# Patient Record
Sex: FEMALE | Race: WHITE | NOT HISPANIC OR LATINO | URBAN - METROPOLITAN AREA
[De-identification: names, ages, dates, MRNs, and addresses within clinical notes are randomized per-mention and may not be internally consistent; named-entity substitution may affect disease eponyms.]

---

## 2017-06-06 ENCOUNTER — OUTPATIENT (OUTPATIENT)
Dept: OUTPATIENT SERVICES | Facility: HOSPITAL | Age: 77
LOS: 1 days | Discharge: HOME | End: 2017-06-06

## 2017-06-06 VITALS
OXYGEN SATURATION: 98 % | DIASTOLIC BLOOD PRESSURE: 79 MMHG | WEIGHT: 143.96 LBS | SYSTOLIC BLOOD PRESSURE: 140 MMHG | TEMPERATURE: 98 F | HEIGHT: 66 IN | HEART RATE: 60 BPM | RESPIRATION RATE: 18 BRPM

## 2017-06-06 DIAGNOSIS — E11.9 TYPE 2 DIABETES MELLITUS WITHOUT COMPLICATIONS: ICD-10-CM

## 2017-06-06 DIAGNOSIS — Z01.818 ENCOUNTER FOR OTHER PREPROCEDURAL EXAMINATION: ICD-10-CM

## 2017-06-06 DIAGNOSIS — I10 ESSENTIAL (PRIMARY) HYPERTENSION: ICD-10-CM

## 2017-06-06 DIAGNOSIS — S83.232D COMPLEX TEAR OF MEDIAL MENISCUS, CURRENT INJURY, LEFT KNEE, SUBSEQUENT ENCOUNTER: ICD-10-CM

## 2017-06-06 DIAGNOSIS — I25.10 ATHEROSCLEROTIC HEART DISEASE OF NATIVE CORONARY ARTERY WITHOUT ANGINA PECTORIS: ICD-10-CM

## 2017-06-06 DIAGNOSIS — M25.569 PAIN IN UNSPECIFIED KNEE: ICD-10-CM

## 2017-06-06 DIAGNOSIS — E78.00 PURE HYPERCHOLESTEROLEMIA, UNSPECIFIED: ICD-10-CM

## 2017-06-06 LAB
ANION GAP SERPL CALC-SCNC: 9 MMOL/L — SIGNIFICANT CHANGE UP (ref 5–17)
APTT BLD: 32.3 SEC — SIGNIFICANT CHANGE UP (ref 27.5–37.4)
BASOPHILS # BLD AUTO: 0.1 K/UL — SIGNIFICANT CHANGE UP (ref 0–0.2)
BASOPHILS NFR BLD AUTO: 0.9 % — SIGNIFICANT CHANGE UP (ref 0–2)
BUN SERPL-MCNC: 16 MG/DL — SIGNIFICANT CHANGE UP (ref 7–23)
CALCIUM SERPL-MCNC: 9.7 MG/DL — SIGNIFICANT CHANGE UP (ref 8.5–10.1)
CHLORIDE SERPL-SCNC: 100 MMOL/L — SIGNIFICANT CHANGE UP (ref 96–108)
CO2 SERPL-SCNC: 29 MMOL/L — SIGNIFICANT CHANGE UP (ref 22–31)
CREAT SERPL-MCNC: 0.84 MG/DL — SIGNIFICANT CHANGE UP (ref 0.5–1.3)
EOSINOPHIL # BLD AUTO: 0.1 K/UL — SIGNIFICANT CHANGE UP (ref 0–0.5)
EOSINOPHIL NFR BLD AUTO: 1.7 % — SIGNIFICANT CHANGE UP (ref 0–6)
GLUCOSE SERPL-MCNC: 267 MG/DL — HIGH (ref 70–99)
HCT VFR BLD CALC: 41 % — SIGNIFICANT CHANGE UP (ref 34.5–45)
HGB BLD-MCNC: 14.5 G/DL — SIGNIFICANT CHANGE UP (ref 11.5–15.5)
INR BLD: 1.05 RATIO — SIGNIFICANT CHANGE UP (ref 0.88–1.16)
LYMPHOCYTES # BLD AUTO: 1.8 K/UL — SIGNIFICANT CHANGE UP (ref 1–3.3)
LYMPHOCYTES # BLD AUTO: 26.1 % — SIGNIFICANT CHANGE UP (ref 13–44)
MCHC RBC-ENTMCNC: 32.3 PG — SIGNIFICANT CHANGE UP (ref 27–34)
MCHC RBC-ENTMCNC: 35.4 GM/DL — SIGNIFICANT CHANGE UP (ref 32–36)
MCV RBC AUTO: 91.5 FL — SIGNIFICANT CHANGE UP (ref 80–100)
MONOCYTES # BLD AUTO: 0.5 K/UL — SIGNIFICANT CHANGE UP (ref 0–0.9)
MONOCYTES NFR BLD AUTO: 7.2 % — SIGNIFICANT CHANGE UP (ref 2–14)
NEUTROPHILS # BLD AUTO: 4.4 K/UL — SIGNIFICANT CHANGE UP (ref 1.8–7.4)
NEUTROPHILS NFR BLD AUTO: 64.2 % — SIGNIFICANT CHANGE UP (ref 43–77)
PLATELET # BLD AUTO: 263 K/UL — SIGNIFICANT CHANGE UP (ref 150–400)
POTASSIUM SERPL-MCNC: 4.3 MMOL/L — SIGNIFICANT CHANGE UP (ref 3.5–5.3)
POTASSIUM SERPL-SCNC: 4.3 MMOL/L — SIGNIFICANT CHANGE UP (ref 3.5–5.3)
PROTHROM AB SERPL-ACNC: 11.5 SEC — SIGNIFICANT CHANGE UP (ref 9.8–12.7)
RBC # BLD: 4.48 M/UL — SIGNIFICANT CHANGE UP (ref 3.8–5.2)
RBC # FLD: 11.9 % — SIGNIFICANT CHANGE UP (ref 11–15)
SODIUM SERPL-SCNC: 138 MMOL/L — SIGNIFICANT CHANGE UP (ref 135–145)
WBC # BLD: 6.9 K/UL — SIGNIFICANT CHANGE UP (ref 3.8–10.5)
WBC # FLD AUTO: 6.9 K/UL — SIGNIFICANT CHANGE UP (ref 3.8–10.5)

## 2017-06-06 RX ORDER — METOPROLOL TARTRATE 50 MG
1 TABLET ORAL
Qty: 0 | Refills: 0 | COMMUNITY

## 2017-06-06 NOTE — H&P PST ADULT - PMH
CAD (coronary artery disease)  s/p MI & stent x1 in 05, x1 in 06 & x1 in 08 in [facility]  Diabetes mellitus    Essential hypertension    Hx of breast cancer    Hypercholesterolemia

## 2017-06-06 NOTE — H&P PST ADULT - PSH
H/O lumpectomy  2001  H/O: hysterectomy  2008  Status post coronary artery stent placement  x3, 2005, 2006, 2008

## 2017-06-06 NOTE — H&P PST ADULT - NSANTHOSAYNRD_GEN_A_CORE
No. MELINDA screening performed.  STOP BANG Legend: 0-2 = LOW Risk; 3-4 = INTERMEDIATE Risk; 5-8 = HIGH Risk/denies

## 2017-06-06 NOTE — ASU PATIENT PROFILE, ADULT - PMH
CAD (coronary artery disease)  s/p MI & stent x1 in 05, x1 in 06 & x1 in 08 in [facility]  Diabetes mellitus    Essential hypertension    Hx of breast cancer    Hypercholesterolemia
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
## 2017-06-16 ENCOUNTER — TRANSCRIPTION ENCOUNTER (OUTPATIENT)
Age: 77
End: 2017-06-16

## 2017-06-16 ENCOUNTER — OUTPATIENT (OUTPATIENT)
Dept: OUTPATIENT SERVICES | Facility: HOSPITAL | Age: 77
LOS: 1 days | Discharge: HOME | End: 2017-06-16
Payer: COMMERCIAL

## 2017-06-16 VITALS
DIASTOLIC BLOOD PRESSURE: 56 MMHG | OXYGEN SATURATION: 96 % | HEART RATE: 58 BPM | HEIGHT: 66 IN | RESPIRATION RATE: 16 BRPM | WEIGHT: 147.93 LBS | TEMPERATURE: 98 F | SYSTOLIC BLOOD PRESSURE: 126 MMHG

## 2017-06-16 VITALS
HEART RATE: 65 BPM | DIASTOLIC BLOOD PRESSURE: 58 MMHG | RESPIRATION RATE: 16 BRPM | TEMPERATURE: 97 F | OXYGEN SATURATION: 100 % | SYSTOLIC BLOOD PRESSURE: 114 MMHG

## 2017-06-16 PROCEDURE — 88304 TISSUE EXAM BY PATHOLOGIST: CPT | Mod: 26

## 2017-06-16 RX ORDER — SODIUM CHLORIDE 9 MG/ML
1000 INJECTION, SOLUTION INTRAVENOUS
Qty: 0 | Refills: 0 | Status: DISCONTINUED | OUTPATIENT
Start: 2017-06-16 | End: 2017-06-16

## 2017-06-16 RX ORDER — SODIUM CHLORIDE 9 MG/ML
3 INJECTION INTRAMUSCULAR; INTRAVENOUS; SUBCUTANEOUS EVERY 8 HOURS
Qty: 0 | Refills: 0 | Status: DISCONTINUED | OUTPATIENT
Start: 2017-06-16 | End: 2017-06-16

## 2017-06-16 RX ORDER — OXYCODONE HYDROCHLORIDE 5 MG/1
1 TABLET ORAL
Qty: 28 | Refills: 0 | OUTPATIENT
Start: 2017-06-16 | End: 2017-06-23

## 2017-06-16 RX ORDER — FENTANYL CITRATE 50 UG/ML
25 INJECTION INTRAVENOUS
Qty: 0 | Refills: 0 | Status: DISCONTINUED | OUTPATIENT
Start: 2017-06-16 | End: 2017-06-16

## 2017-06-16 RX ORDER — ACETAMINOPHEN 500 MG
1000 TABLET ORAL ONCE
Qty: 0 | Refills: 0 | Status: COMPLETED | OUTPATIENT
Start: 2017-06-16 | End: 2017-06-16

## 2017-06-16 RX ADMIN — Medication 400 MILLIGRAM(S): at 09:36

## 2017-06-16 RX ADMIN — SODIUM CHLORIDE 75 MILLILITER(S): 9 INJECTION, SOLUTION INTRAVENOUS at 08:54

## 2017-06-16 RX ADMIN — Medication 1000 MILLIGRAM(S): at 09:47

## 2017-06-16 NOTE — ASU DISCHARGE PLAN (ADULT/PEDIATRIC). - NOTIFY
Pain not relieved by Medications/Numbness, tingling/Swelling that continues/Fever greater than 101/Numbness, color, or temperature change to extremity/Bleeding that does not stop

## 2017-06-16 NOTE — BRIEF OPERATIVE NOTE - OPERATION/FINDINGS
right knee arthroscopy, partial medial and lateral menisectomy, chondroplasty, synovectomy  see op report

## 2017-06-16 NOTE — ASU PATIENT PROFILE, ADULT - PMH
CAD (coronary artery disease)  s/p MI & stent x1 in 05, x1 in 06 & x1 in 08 in [facility]  Diabetes mellitus    Essential hypertension    Hx of breast cancer    Hypercholesterolemia CAD (coronary artery disease)  s/p MI & stent x1 in 05, x1 in 06 & x1 in 08 in [facility]  Diabetes mellitus    Essential hypertension    Hx of breast cancer    Hypercholesterolemia

## 2017-06-16 NOTE — ASU DISCHARGE PLAN (ADULT/PEDIATRIC). - MEDICATION SUMMARY - MEDICATIONS TO TAKE
I will START or STAY ON the medications listed below when I get home from the hospital:    CoQ10 & Cinnamon  -- 1 dose(s) by mouth once a day  -- Indication: For per pmd    acetaminophen-oxyCODONE 325 mg-5 mg oral tablet  -- 1 tab(s) by mouth every 6 hours, As Needed MDD:4 for pain  -- Caution federal law prohibits the transfer of this drug to any person other  than the person for whom it was prescribed.  May cause drowsiness.  Alcohol may intensify this effect.  Use care when operating dangerous machinery.  This prescription cannot be refilled.  This product contains acetaminophen.  Do not use  with any other product containing acetaminophen to prevent possible liver damage.  Using more of this medication than prescribed may cause serious breathing problems.    -- Indication: For prn pain    aspirin 81 mg oral tablet  -- 1 tab(s) by mouth once a day  -- Indication: For  per pmd    metFORMIN 1000 mg oral tablet  -- 1 tab(s) by mouth 2 times a day  -- Indication: For  per pmd    glimepiride 4 mg oral tablet  -- 1 tab(s) by mouth once a day  -- Indication: For per pmd    atorvastatin 40 mg oral tablet  -- 1 tab(s) by mouth once a day (at bedtime)  -- Indication: For per pmd    metoprolol tartrate 50 mg oral tablet  -- 1 tab(s) by mouth once a day  -- Indication: For  per pmd    amLODIPine 10 mg oral tablet  -- 1 tab(s) by mouth once a day (at bedtime)  -- Indication: For per pmd    garlic oral capsule  -- 1 cap(s) by mouth once a day  -- Indication: For per pmd    Fish Oil 1000 mg oral capsule  -- 1 cap(s) by mouth once a day  -- Indication: For per pmd    Vitamin C 500 mg oral tablet  -- 1 tab(s) by mouth once a day  -- Indication: For per pmd

## 2017-06-19 LAB — SURGICAL PATHOLOGY FINAL REPORT - CH: SIGNIFICANT CHANGE UP

## 2017-10-02 NOTE — ASU PATIENT PROFILE, ADULT - PRO INTERPRETER NEED 2
[facility] faxed referral for rehab to [place] with [facility] for review. Patient is ready to discharge today for rehab.    Sandie Earl LMSW  [facility]  Ext. [phone]     English

## 2017-11-07 PROBLEM — Z00.00 ENCOUNTER FOR PREVENTIVE HEALTH EXAMINATION: Status: ACTIVE | Noted: 2017-11-07

## 2017-11-17 ENCOUNTER — APPOINTMENT (OUTPATIENT)
Dept: ORTHOPEDIC SURGERY | Facility: CLINIC | Age: 77
End: 2017-11-17
Payer: MEDICARE

## 2017-11-17 VITALS — BODY MASS INDEX: 23.78 KG/M2 | HEIGHT: 66 IN | WEIGHT: 148 LBS

## 2017-11-17 PROCEDURE — 73564 X-RAY EXAM KNEE 4 OR MORE: CPT | Mod: LT,RT

## 2017-11-17 PROCEDURE — 99203 OFFICE O/P NEW LOW 30 MIN: CPT

## 2017-12-08 ENCOUNTER — APPOINTMENT (OUTPATIENT)
Dept: ORTHOPEDIC SURGERY | Facility: CLINIC | Age: 77
End: 2017-12-08
Payer: MEDICARE

## 2017-12-08 PROCEDURE — 20610 DRAIN/INJ JOINT/BURSA W/O US: CPT | Mod: RT

## 2017-12-08 PROCEDURE — 99213 OFFICE O/P EST LOW 20 MIN: CPT | Mod: 25

## 2017-12-15 ENCOUNTER — APPOINTMENT (OUTPATIENT)
Dept: ORTHOPEDIC SURGERY | Facility: CLINIC | Age: 77
End: 2017-12-15
Payer: MEDICARE

## 2017-12-15 PROCEDURE — 20610 DRAIN/INJ JOINT/BURSA W/O US: CPT | Mod: LT

## 2017-12-22 ENCOUNTER — APPOINTMENT (OUTPATIENT)
Dept: ORTHOPEDIC SURGERY | Facility: CLINIC | Age: 77
End: 2017-12-22
Payer: MEDICARE

## 2017-12-22 PROCEDURE — 20610 DRAIN/INJ JOINT/BURSA W/O US: CPT | Mod: RT

## 2018-02-02 ENCOUNTER — APPOINTMENT (OUTPATIENT)
Dept: ORTHOPEDIC SURGERY | Facility: CLINIC | Age: 78
End: 2018-02-02
Payer: MEDICARE

## 2018-02-02 PROCEDURE — 99214 OFFICE O/P EST MOD 30 MIN: CPT

## 2019-04-26 ENCOUNTER — INPATIENT (INPATIENT)
Facility: HOSPITAL | Age: 79
LOS: 5 days | Discharge: HOME HEALTH | DRG: 224 | End: 2019-05-02
Attending: INTERNAL MEDICINE | Admitting: INTERNAL MEDICINE
Payer: MEDICARE

## 2019-04-26 VITALS
TEMPERATURE: 98 F | DIASTOLIC BLOOD PRESSURE: 72 MMHG | HEART RATE: 93 BPM | OXYGEN SATURATION: 96 % | RESPIRATION RATE: 17 BRPM | SYSTOLIC BLOOD PRESSURE: 128 MMHG

## 2019-04-26 DIAGNOSIS — I10 ESSENTIAL (PRIMARY) HYPERTENSION: ICD-10-CM

## 2019-04-26 DIAGNOSIS — I20.0 UNSTABLE ANGINA: ICD-10-CM

## 2019-04-26 DIAGNOSIS — E11.9 TYPE 2 DIABETES MELLITUS WITHOUT COMPLICATIONS: ICD-10-CM

## 2019-04-26 LAB
ALBUMIN SERPL ELPH-MCNC: 3.8 G/DL — SIGNIFICANT CHANGE UP (ref 3.3–5)
ALP SERPL-CCNC: 57 U/L — SIGNIFICANT CHANGE UP (ref 40–120)
ALT FLD-CCNC: 18 U/L — SIGNIFICANT CHANGE UP (ref 10–45)
ANION GAP SERPL CALC-SCNC: 11 MMOL/L — SIGNIFICANT CHANGE UP (ref 5–17)
APTT BLD: 30.4 SEC — SIGNIFICANT CHANGE UP (ref 27.5–36.3)
AST SERPL-CCNC: 16 U/L — SIGNIFICANT CHANGE UP (ref 10–40)
BASOPHILS # BLD AUTO: 0.03 K/UL — SIGNIFICANT CHANGE UP (ref 0–0.2)
BASOPHILS NFR BLD AUTO: 0.5 % — SIGNIFICANT CHANGE UP (ref 0–2)
BILIRUB SERPL-MCNC: 0.6 MG/DL — SIGNIFICANT CHANGE UP (ref 0.2–1.2)
BUN SERPL-MCNC: 14 MG/DL — SIGNIFICANT CHANGE UP (ref 7–23)
CALCIUM SERPL-MCNC: 9.7 MG/DL — SIGNIFICANT CHANGE UP (ref 8.4–10.5)
CHLORIDE SERPL-SCNC: 102 MMOL/L — SIGNIFICANT CHANGE UP (ref 96–108)
CK MB CFR SERPL CALC: 1.3 NG/ML — SIGNIFICANT CHANGE UP (ref 0–6.7)
CK MB CFR SERPL CALC: <1 NG/ML — SIGNIFICANT CHANGE UP (ref 0–6.7)
CK SERPL-CCNC: 27 U/L — SIGNIFICANT CHANGE UP (ref 25–170)
CO2 SERPL-SCNC: 26 MMOL/L — SIGNIFICANT CHANGE UP (ref 22–31)
CREAT SERPL-MCNC: 0.6 MG/DL — SIGNIFICANT CHANGE UP (ref 0.5–1.3)
EOSINOPHIL # BLD AUTO: 0.09 K/UL — SIGNIFICANT CHANGE UP (ref 0–0.5)
EOSINOPHIL NFR BLD AUTO: 1.6 % — SIGNIFICANT CHANGE UP (ref 0–6)
GLUCOSE BLDC GLUCOMTR-MCNC: 215 MG/DL — HIGH (ref 70–99)
GLUCOSE BLDC GLUCOMTR-MCNC: 221 MG/DL — HIGH (ref 70–99)
GLUCOSE BLDC GLUCOMTR-MCNC: 222 MG/DL — HIGH (ref 70–99)
GLUCOSE SERPL-MCNC: 213 MG/DL — HIGH (ref 70–99)
HCT VFR BLD CALC: 38.4 % — SIGNIFICANT CHANGE UP (ref 34.5–45)
HGB BLD-MCNC: 12.5 G/DL — SIGNIFICANT CHANGE UP (ref 11.5–15.5)
IMM GRANULOCYTES NFR BLD AUTO: 0.2 % — SIGNIFICANT CHANGE UP (ref 0–1.5)
INR BLD: 1.1 — SIGNIFICANT CHANGE UP (ref 0.88–1.16)
LYMPHOCYTES # BLD AUTO: 2.97 K/UL — SIGNIFICANT CHANGE UP (ref 1–3.3)
LYMPHOCYTES # BLD AUTO: 54.1 % — HIGH (ref 13–44)
MCHC RBC-ENTMCNC: 31 PG — SIGNIFICANT CHANGE UP (ref 27–34)
MCHC RBC-ENTMCNC: 32.6 GM/DL — SIGNIFICANT CHANGE UP (ref 32–36)
MCV RBC AUTO: 95.3 FL — SIGNIFICANT CHANGE UP (ref 80–100)
MONOCYTES # BLD AUTO: 0.48 K/UL — SIGNIFICANT CHANGE UP (ref 0–0.9)
MONOCYTES NFR BLD AUTO: 8.7 % — SIGNIFICANT CHANGE UP (ref 2–14)
NEUTROPHILS # BLD AUTO: 1.91 K/UL — SIGNIFICANT CHANGE UP (ref 1.8–7.4)
NEUTROPHILS NFR BLD AUTO: 34.9 % — LOW (ref 43–77)
NRBC # BLD: 0 /100 WBCS — SIGNIFICANT CHANGE UP (ref 0–0)
PLATELET # BLD AUTO: 228 K/UL — SIGNIFICANT CHANGE UP (ref 150–400)
POTASSIUM SERPL-MCNC: 4.4 MMOL/L — SIGNIFICANT CHANGE UP (ref 3.5–5.3)
POTASSIUM SERPL-SCNC: 4.4 MMOL/L — SIGNIFICANT CHANGE UP (ref 3.5–5.3)
PROT SERPL-MCNC: 7.5 G/DL — SIGNIFICANT CHANGE UP (ref 6–8.3)
PROTHROM AB SERPL-ACNC: 12.5 SEC — SIGNIFICANT CHANGE UP (ref 10–12.9)
RBC # BLD: 4.03 M/UL — SIGNIFICANT CHANGE UP (ref 3.8–5.2)
RBC # FLD: 14.6 % — HIGH (ref 10.3–14.5)
SODIUM SERPL-SCNC: 139 MMOL/L — SIGNIFICANT CHANGE UP (ref 135–145)
TROPONIN T SERPL-MCNC: <0.01 NG/ML — SIGNIFICANT CHANGE UP (ref 0–0.01)
TROPONIN T SERPL-MCNC: <0.01 NG/ML — SIGNIFICANT CHANGE UP (ref 0–0.01)
WBC # BLD: 5.49 K/UL — SIGNIFICANT CHANGE UP (ref 3.8–10.5)
WBC # FLD AUTO: 5.49 K/UL — SIGNIFICANT CHANGE UP (ref 3.8–10.5)

## 2019-04-26 PROCEDURE — 99285 EMERGENCY DEPT VISIT HI MDM: CPT | Mod: 25

## 2019-04-26 PROCEDURE — 71046 X-RAY EXAM CHEST 2 VIEWS: CPT | Mod: 26

## 2019-04-26 PROCEDURE — 93010 ELECTROCARDIOGRAM REPORT: CPT

## 2019-04-26 PROCEDURE — 93970 EXTREMITY STUDY: CPT | Mod: 26

## 2019-04-26 RX ORDER — OMEGA-3 ACID ETHYL ESTERS 1 G
1 CAPSULE ORAL
Qty: 0 | Refills: 0 | COMMUNITY

## 2019-04-26 RX ORDER — INSULIN LISPRO 100/ML
VIAL (ML) SUBCUTANEOUS
Qty: 0 | Refills: 0 | Status: DISCONTINUED | OUTPATIENT
Start: 2019-04-26 | End: 2019-04-27

## 2019-04-26 RX ORDER — ATORVASTATIN CALCIUM 80 MG/1
40 TABLET, FILM COATED ORAL AT BEDTIME
Qty: 0 | Refills: 0 | Status: DISCONTINUED | OUTPATIENT
Start: 2019-04-26 | End: 2019-05-02

## 2019-04-26 RX ORDER — SODIUM CHLORIDE 9 MG/ML
500 INJECTION INTRAMUSCULAR; INTRAVENOUS; SUBCUTANEOUS
Qty: 0 | Refills: 0 | Status: DISCONTINUED | OUTPATIENT
Start: 2019-04-26 | End: 2019-04-26

## 2019-04-26 RX ORDER — FLUTICASONE PROPIONATE 50 MCG
1 SPRAY, SUSPENSION NASAL
Qty: 0 | Refills: 0 | Status: DISCONTINUED | OUTPATIENT
Start: 2019-04-26 | End: 2019-04-27

## 2019-04-26 RX ORDER — PROPOFOL 10 MG/ML
5 INJECTION, EMULSION INTRAVENOUS
Qty: 1000 | Refills: 0 | Status: DISCONTINUED | OUTPATIENT
Start: 2019-04-26 | End: 2019-04-27

## 2019-04-26 RX ORDER — NOREPINEPHRINE BITARTRATE/D5W 8 MG/250ML
0.05 PLASTIC BAG, INJECTION (ML) INTRAVENOUS
Qty: 8 | Refills: 0 | Status: DISCONTINUED | OUTPATIENT
Start: 2019-04-26 | End: 2019-04-28

## 2019-04-26 RX ORDER — METOPROLOL TARTRATE 50 MG
50 TABLET ORAL DAILY
Qty: 0 | Refills: 0 | Status: DISCONTINUED | OUTPATIENT
Start: 2019-04-26 | End: 2019-04-27

## 2019-04-26 RX ORDER — AMIODARONE HYDROCHLORIDE 400 MG/1
150 TABLET ORAL ONCE
Qty: 0 | Refills: 0 | Status: COMPLETED | OUTPATIENT
Start: 2019-04-26 | End: 2019-04-26

## 2019-04-26 RX ORDER — CLOPIDOGREL BISULFATE 75 MG/1
75 TABLET, FILM COATED ORAL DAILY
Qty: 0 | Refills: 0 | Status: DISCONTINUED | OUTPATIENT
Start: 2019-04-27 | End: 2019-05-02

## 2019-04-26 RX ORDER — ASCORBIC ACID 60 MG
1 TABLET,CHEWABLE ORAL
Qty: 0 | Refills: 0 | COMMUNITY

## 2019-04-26 RX ORDER — METFORMIN HYDROCHLORIDE 850 MG/1
1 TABLET ORAL
Qty: 0 | Refills: 0 | COMMUNITY

## 2019-04-26 RX ORDER — CLOPIDOGREL BISULFATE 75 MG/1
600 TABLET, FILM COATED ORAL ONCE
Qty: 0 | Refills: 0 | Status: COMPLETED | OUTPATIENT
Start: 2019-04-26 | End: 2019-04-26

## 2019-04-26 RX ORDER — AMLODIPINE BESYLATE 2.5 MG/1
10 TABLET ORAL AT BEDTIME
Qty: 0 | Refills: 0 | Status: DISCONTINUED | OUTPATIENT
Start: 2019-04-26 | End: 2019-04-27

## 2019-04-26 RX ORDER — SODIUM CHLORIDE 9 MG/ML
1000 INJECTION INTRAMUSCULAR; INTRAVENOUS; SUBCUTANEOUS ONCE
Qty: 0 | Refills: 0 | Status: COMPLETED | OUTPATIENT
Start: 2019-04-26 | End: 2019-04-26

## 2019-04-26 RX ORDER — ASPIRIN/CALCIUM CARB/MAGNESIUM 324 MG
81 TABLET ORAL DAILY
Qty: 0 | Refills: 0 | Status: DISCONTINUED | OUTPATIENT
Start: 2019-04-26 | End: 2019-04-28

## 2019-04-26 RX ORDER — GLIMEPIRIDE 1 MG
1 TABLET ORAL
Qty: 0 | Refills: 0 | COMMUNITY

## 2019-04-26 RX ORDER — GARLIC 1000 MG
1 CAPSULE ORAL
Qty: 0 | Refills: 0 | COMMUNITY

## 2019-04-26 RX ORDER — METOPROLOL TARTRATE 50 MG
1 TABLET ORAL
Qty: 0 | Refills: 0 | COMMUNITY

## 2019-04-26 RX ORDER — AMIODARONE HYDROCHLORIDE 400 MG/1
0.5 TABLET ORAL
Qty: 900 | Refills: 0 | Status: DISCONTINUED | OUTPATIENT
Start: 2019-04-26 | End: 2019-04-27

## 2019-04-26 RX ADMIN — Medication 81 MILLIGRAM(S): at 15:36

## 2019-04-26 RX ADMIN — AMIODARONE HYDROCHLORIDE 600 MILLIGRAM(S): 400 TABLET ORAL at 23:31

## 2019-04-26 RX ADMIN — MORPHINE SULFATE 1 MILLIGRAM(S): 50 CAPSULE, EXTENDED RELEASE ORAL at 23:45

## 2019-04-26 RX ADMIN — SODIUM CHLORIDE 75 MILLILITER(S): 9 INJECTION INTRAMUSCULAR; INTRAVENOUS; SUBCUTANEOUS at 16:02

## 2019-04-26 RX ADMIN — Medication 50 MILLIGRAM(S): at 15:36

## 2019-04-26 RX ADMIN — Medication 2: at 16:04

## 2019-04-26 RX ADMIN — MORPHINE SULFATE 1 MILLIGRAM(S): 50 CAPSULE, EXTENDED RELEASE ORAL at 23:30

## 2019-04-26 RX ADMIN — AMLODIPINE BESYLATE 10 MILLIGRAM(S): 2.5 TABLET ORAL at 22:45

## 2019-04-26 RX ADMIN — Medication 2: at 22:45

## 2019-04-26 RX ADMIN — CLOPIDOGREL BISULFATE 600 MILLIGRAM(S): 75 TABLET, FILM COATED ORAL at 16:02

## 2019-04-26 RX ADMIN — ATORVASTATIN CALCIUM 40 MILLIGRAM(S): 80 TABLET, FILM COATED ORAL at 22:45

## 2019-04-26 NOTE — PROCEDURE NOTE - NSPROCDETAILS_GEN_ALL_CORE
patient pre-oxygenated, tube inserted, placement confirmed/Bag Valve ventilation by RT after intubation

## 2019-04-26 NOTE — H&P ADULT - ASSESSMENT
78yo F with FHx CAD and PMHx of HLD, HTN, NIDDM-II, right breast CA s/p lumpectomy and chemo (in remission) and CAD s/p PCI x 3 (last 2008 @ [facility]) who presented to [facility] ED on 4/26/19 complaining of unstable angina.

## 2019-04-26 NOTE — H&P ADULT - PROBLEM SELECTOR PLAN 4
- follow up LE duplex ordered in ED    DVT PPx: Heparin SQ  DISPO: NPO for possible cardiac cath today

## 2019-04-26 NOTE — H&P ADULT - HISTORY OF PRESENT ILLNESS
80yo F with PMHx of HLD, DM and CAD s/p PCI x 3 who presented to [facility] ED on 4/26/19 complaining of SOB on minimal exertion x 3 weeks. She also had a short episode of mild CP a few days ago. Symptoms are similar to prior stents. Vitals on arrival to the ED were Temp 98F, HR 93bpm, /72, RR 17, O2 sat 96% RA. Labs significant for trop negative x 1, BNP 1141. EKG revealed SR @ 73bpm with incomplete LBBB. CXR revealed right middle/lower lobe infiltrate vs atelectasis per verbal radiology read. 80yo F with FHx CAD and PMHx of HLD, HTN, NIDDM-II, right breast CA s/p lumpectomy and chemo (in remission) and CAD s/p PCI x 3 (last 2008 @ [facility]) who presented to [facility] ED on 4/26/19 complaining of SOB on minimal exertion x 2 weeks. Patient reports that she used to be able to walk about 5 blocks without an issue but recently cannot even walk a full block without having to stop 2/2 SOB. Additionally, she reports she has been having intermittent, substernal, 5/10, pressure-like CP both at rest and on exertion that lasts for about 1-2 min before resolving on its own. She also reports worsening LE edema. She states that these symptoms are similar to the last time she had a cardiac cath. Patient denies fever, chills, palpitations, PND, orthopnea, hematochezia, melena, dizziness, syncope. Vitals on arrival to the ED were Temp 98F, HR 93bpm, /72, RR 17, O2 sat 96% RA. Labs significant for trop negative x 1, BNP 1141. EKG revealed SR @ 73bpm with incomplete LBBB. CXR revealed right middle/lower lobe infiltrate vs atelectasis per verbal radiology read. Patient is now admitted to [facility] for further management of unstable angina. 78yo F with FHx CAD and PMHx of HLD, HTN, NIDDM-II, right breast CA s/p lumpectomy and chemo (in remission) and CAD s/p PCI x 3 (last 2008 @ [facility]) who presented to [facility] ED on 4/26/19 complaining of SOB on minimal exertion x 2 weeks. Patient reports that she used to be able to walk about 5 blocks without an issue but recently cannot even walk a full block without having to stop 2/2 SOB. Additionally, she reports she has been having intermittent, substernal, 5/10, pressure-like CP both at rest and on exertion that lasts for about 1-2 min before resolving on its own. She also reports worsening LE edema. She states that these symptoms are similar to the last time she had a cardiac cath. Patient denies fever, chills, palpitations, PND, orthopnea, hematochezia, melena, dizziness, syncope. Vitals on arrival to the ED were Temp 98F, HR 93bpm, /72, RR 17, O2 sat 96% RA. Labs significant for trop negative x 1, BNP 1141. EKG revealed SR @ 73bpm with incomplete LBBB and PVCs. CXR revealed right middle/lower lobe infiltrate vs atelectasis per verbal radiology read. Patient is now admitted to [facility] for further management of unstable angina.

## 2019-04-26 NOTE — PROCEDURE NOTE - ADDITIONAL PROCEDURE DETAILS
Was called to intubate this patient.  Pre-O2 with 100% O2.  Patient sedated with Propofol.  DL done using MAC #3 with cricoid pressure.  Vocal cords visualized, ETT placed atraumatically, attempt x1.  Breath sounds bilateral, EtCO2 (+).  ETT secured, CXR pending

## 2019-04-26 NOTE — H&P ADULT - NSHPLABSRESULTS_GEN_ALL_CORE
12.5   5.49  )-----------( 228      ( 26 Apr 2019 09:41 )             38.4       04-26    139  |  102  |  14  ----------------------------<  213<H>  4.4   |  26  |  0.60    Ca    9.7      26 Apr 2019 09:41    TPro  7.5  /  Alb  3.8  /  TBili  0.6  /  DBili  x   /  AST  16  /  ALT  18  /  AlkPhos  57  04-26      PT/INR - ( 26 Apr 2019 09:41 )   PT: 12.5 sec;   INR: 1.10          PTT - ( 26 Apr 2019 09:41 )  PTT:30.4 sec    CARDIAC MARKERS ( 26 Apr 2019 09:41 )  x     / <0.01 ng/mL / 33 U/L / x     / 1.3 ng/mL

## 2019-04-26 NOTE — H&P ADULT - NSICDXPASTMEDICALHX_GEN_ALL_CORE_FT
PAST MEDICAL HISTORY:  CAD (coronary artery disease) s/p MI & stent x1 in 05, x1 in 06 & x1 in 08 in [facility]    Diabetes mellitus     Essential hypertension     Hx of breast cancer     Hypercholesterolemia

## 2019-04-26 NOTE — ED ADULT TRIAGE NOTE - OTHER COMPLAINTS
pt c.o sob x 3 weeks worsening with exertion. c.o b/l leg swelling. scheduled for stress test in 2 weeks. hx cardiac stents. denies cp,cough, fever.

## 2019-04-26 NOTE — PATIENT PROFILE ADULT - LIVES WITH
Cai.      alone

## 2019-04-26 NOTE — ED PROVIDER NOTE - OBJECTIVE STATEMENT
80 yo with ho of DM, HLD, CAD w 3 stents, last stent 2008, remote breast CA with lumpectomy in 2001,   patient reports 3 weeks of new exertional SOB, even with light exertion, short episode of CP at rest last week, no cough or fevers, no ho of PE, remote CA, no hormones, no travel or surgery recently, no leg pain, no edema, ho of mild abd pain, no n/v/d, no black or bloody stools,

## 2019-04-26 NOTE — H&P ADULT - PROBLEM SELECTOR PLAN 1
CP free and HD stable  - monitor on tele  - continue ASA 81mg QD, lipitor 40mg QHS  - trops negative x 1, follow up repeats  - tentative plan for cardiac cath today CP free and HD stable  - monitor on tele  - continue ASA 81mg QD, lipitor 40mg QHS and toprol XL 25mg QD; loaded with plavix 600mg   - NS @ 75cc/hr pre-cath fluids  - trops negative x 1, follow up repeats  - NPO for cardiac cath today with Dr. Dumotn CP free and HD stable  - monitor on tele  - continue ASA 81mg QD, lipitor 40mg QHS and toprol XL 25mg QD; loaded with plavix 600mg   - NS @ 75cc/hr pre-cath fluids  - trops negative x 1, follow up repeats  - follow up echo  - NPO for cardiac cath today with Dr. Dumont

## 2019-04-26 NOTE — PROGRESS NOTE ADULT - SUBJECTIVE AND OBJECTIVE BOX
Pt is s/p CAD   Hx 3 Coronary Stents  who noted  a return of symptoms   Chest pain and dyspnea with exertion   for 3 weeks     PAST MEDICAL & SURGICAL HISTORY:  CAD (coronary artery disease): s/p MI &amp; stent x1 in 05, x1 in 06 &amp; x1 in 08 in [facility]  Hx of breast cancer  Essential hypertension  Hypercholesterolemia  Diabetes mellitus  H/O lumpectomy: 2001  Status post coronary artery stent placement: x3, 2005, 2006, 2008  H/O: hysterectomy: 2008      MEDICATIONS  (STANDING):    MEDICATIONS  (PRN):    Home Medications:  amLODIPine 10 mg oral tablet: 1 tab(s) orally once a day (at bedtime) (16 Jun 2017 06:42)  aspirin 81 mg oral tablet: 1 tab(s) orally once a day (16 Jun 2017 06:42)  atorvastatin 40 mg oral tablet: 1 tab(s) orally once a day (at bedtime) (16 Jun 2017 06:42)  CoQ10 &amp; Cinnamon: 1 dose(s) orally once a day (16 Jun 2017 06:42)  Fish Oil 1000 mg oral capsule: 1 cap(s) orally once a day (16 Jun 2017 06:42)  garlic oral capsule: 1 cap(s) orally once a day (16 Jun 2017 06:42)  glimepiride 4 mg oral tablet: 1 tab(s) orally once a day (16 Jun 2017 06:42)  metFORMIN 1000 mg oral tablet: 1 tab(s) orally 2 times a day (16 Jun 2017 06:42)  metoprolol tartrate 50 mg oral tablet: 1 tab(s) orally once a day (16 Jun 2017 06:42)  Vitamin C 500 mg oral tablet: 1 tab(s) orally once a day (06 Jun 2017 10:07)    ICU Vital Signs Last 24 Hrs  T(C): 36.6 (26 Apr 2019 12:40), Max: 36.7 (26 Apr 2019 09:13)  T(F): 97.8 (26 Apr 2019 12:40), Max: 98 (26 Apr 2019 09:13)  HR: 64 (26 Apr 2019 12:40) (64 - 93)  BP: 121/62 (26 Apr 2019 12:40) (121/62 - 128/72)  BP(mean): --  ABP: --  ABP(mean): --  RR: 17 (26 Apr 2019 12:40) (17 - 17)  SpO2: 98% (26 Apr 2019 12:40) (96% - 98%)      Lungs clear  Cv s1 s2  abd soft  etx stable                          12.5   5.49  )-----------( 228      ( 26 Apr 2019 09:41 )             38.4   04-26    139  |  102  |  14  ----------------------------<  213<H>  4.4   |  26  |  0.60    Ca    9.7      26 Apr 2019 09:41    TPro  7.5  /  Alb  3.8  /  TBili  0.6  /  DBili  x   /  AST  16  /  ALT  18  /  AlkPhos  57  04-26      CARDIAC MARKERS ( 26 Apr 2019 09:41 )  x     / <0.01 ng/mL / 33 U/L / x     / 1.3 ng/mL    EKG  NS  PVC s    Incomp   LBBB  NSSTTC

## 2019-04-26 NOTE — H&P ADULT - NSICDXPASTSURGICALHX_GEN_ALL_CORE_FT
PAST SURGICAL HISTORY:  H/O lumpectomy 2001    H/O: hysterectomy 2008    Status post coronary artery stent placement x3, 2005, 2006, 2008

## 2019-04-26 NOTE — ED PROVIDER NOTE - PMH
CAD (coronary artery disease)  s/p MI & stent x1 in 05, x1 in 06 & x1 in 08 in [facility]  Diabetes mellitus    Essential hypertension    Hx of breast cancer    Hypercholesterolemia
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
## 2019-04-26 NOTE — ED PROVIDER NOTE - CLINICAL SUMMARY MEDICAL DECISION MAKING FREE TEXT BOX
80 yo with new exertional SOB, suspect ACS, consider anemia labs pending, no black or bloody stools, unlikely pna, no cough or fevrs, consider CHF but less likely as lungs clear on exam  PLAN : EKG, L , CXR  likely admission for further cardiac evaluation and treatment.   dispo pending workup.

## 2019-04-26 NOTE — ED PROVIDER NOTE - X-RAY INTERPRETATION
ER physician/blunting of right constophrenic angle, possible infiltrate vs effusion, vs atelectasis , heart shadow normal, no bony abnormalities

## 2019-04-27 DIAGNOSIS — I47.2 VENTRICULAR TACHYCARDIA: ICD-10-CM

## 2019-04-27 DIAGNOSIS — Z91.89 OTHER SPECIFIED PERSONAL RISK FACTORS, NOT ELSEWHERE CLASSIFIED: ICD-10-CM

## 2019-04-27 DIAGNOSIS — I10 ESSENTIAL (PRIMARY) HYPERTENSION: ICD-10-CM

## 2019-04-27 DIAGNOSIS — A41.9 SEPSIS, UNSPECIFIED ORGANISM: ICD-10-CM

## 2019-04-27 DIAGNOSIS — R63.8 OTHER SYMPTOMS AND SIGNS CONCERNING FOOD AND FLUID INTAKE: ICD-10-CM

## 2019-04-27 LAB
ALBUMIN SERPL ELPH-MCNC: 3.7 G/DL — SIGNIFICANT CHANGE UP (ref 3.3–5)
ALBUMIN SERPL ELPH-MCNC: 3.8 G/DL — SIGNIFICANT CHANGE UP (ref 3.3–5)
ALP SERPL-CCNC: 56 U/L — SIGNIFICANT CHANGE UP (ref 40–120)
ALP SERPL-CCNC: 59 U/L — SIGNIFICANT CHANGE UP (ref 40–120)
ALT FLD-CCNC: 41 U/L — SIGNIFICANT CHANGE UP (ref 10–45)
ALT FLD-CCNC: 56 U/L — HIGH (ref 10–45)
ANION GAP SERPL CALC-SCNC: 10 MMOL/L — SIGNIFICANT CHANGE UP (ref 5–17)
ANION GAP SERPL CALC-SCNC: 14 MMOL/L — SIGNIFICANT CHANGE UP (ref 5–17)
ANION GAP SERPL CALC-SCNC: 17 MMOL/L — SIGNIFICANT CHANGE UP (ref 5–17)
APTT BLD: 133 SEC — CRITICAL HIGH (ref 27.5–36.3)
APTT BLD: 23.9 SEC — LOW (ref 27.5–36.3)
AST SERPL-CCNC: 38 U/L — SIGNIFICANT CHANGE UP (ref 10–40)
AST SERPL-CCNC: 52 U/L — HIGH (ref 10–40)
BASE EXCESS BLDA CALC-SCNC: -0.2 MMOL/L — SIGNIFICANT CHANGE UP (ref -2–3)
BASE EXCESS BLDA CALC-SCNC: 3.1 MMOL/L — HIGH (ref -2–3)
BASE EXCESS BLDV CALC-SCNC: 1.1 MMOL/L — SIGNIFICANT CHANGE UP
BASOPHILS # BLD AUTO: 0.04 K/UL — SIGNIFICANT CHANGE UP (ref 0–0.2)
BASOPHILS # BLD AUTO: 0.04 K/UL — SIGNIFICANT CHANGE UP (ref 0–0.2)
BASOPHILS NFR BLD AUTO: 0.3 % — SIGNIFICANT CHANGE UP (ref 0–2)
BASOPHILS NFR BLD AUTO: 0.4 % — SIGNIFICANT CHANGE UP (ref 0–2)
BILIRUB SERPL-MCNC: 0.6 MG/DL — SIGNIFICANT CHANGE UP (ref 0.2–1.2)
BILIRUB SERPL-MCNC: 0.6 MG/DL — SIGNIFICANT CHANGE UP (ref 0.2–1.2)
BUN SERPL-MCNC: 13 MG/DL — SIGNIFICANT CHANGE UP (ref 7–23)
BUN SERPL-MCNC: 16 MG/DL — SIGNIFICANT CHANGE UP (ref 7–23)
BUN SERPL-MCNC: 18 MG/DL — SIGNIFICANT CHANGE UP (ref 7–23)
CALCIUM SERPL-MCNC: 8.9 MG/DL — SIGNIFICANT CHANGE UP (ref 8.4–10.5)
CALCIUM SERPL-MCNC: 8.9 MG/DL — SIGNIFICANT CHANGE UP (ref 8.4–10.5)
CALCIUM SERPL-MCNC: 9.5 MG/DL — SIGNIFICANT CHANGE UP (ref 8.4–10.5)
CHLORIDE SERPL-SCNC: 102 MMOL/L — SIGNIFICANT CHANGE UP (ref 96–108)
CHLORIDE SERPL-SCNC: 104 MMOL/L — SIGNIFICANT CHANGE UP (ref 96–108)
CHLORIDE SERPL-SCNC: 98 MMOL/L — SIGNIFICANT CHANGE UP (ref 96–108)
CK MB CFR SERPL CALC: 1 NG/ML — SIGNIFICANT CHANGE UP (ref 0–6.7)
CK SERPL-CCNC: 25 U/L — SIGNIFICANT CHANGE UP (ref 25–170)
CO2 SERPL-SCNC: 19 MMOL/L — LOW (ref 22–31)
CO2 SERPL-SCNC: 22 MMOL/L — SIGNIFICANT CHANGE UP (ref 22–31)
CO2 SERPL-SCNC: 24 MMOL/L — SIGNIFICANT CHANGE UP (ref 22–31)
CREAT SERPL-MCNC: 0.59 MG/DL — SIGNIFICANT CHANGE UP (ref 0.5–1.3)
CREAT SERPL-MCNC: 0.74 MG/DL — SIGNIFICANT CHANGE UP (ref 0.5–1.3)
CREAT SERPL-MCNC: 0.77 MG/DL — SIGNIFICANT CHANGE UP (ref 0.5–1.3)
EOSINOPHIL # BLD AUTO: 0.01 K/UL — SIGNIFICANT CHANGE UP (ref 0–0.5)
EOSINOPHIL # BLD AUTO: 0.07 K/UL — SIGNIFICANT CHANGE UP (ref 0–0.5)
EOSINOPHIL NFR BLD AUTO: 0.1 % — SIGNIFICANT CHANGE UP (ref 0–6)
EOSINOPHIL NFR BLD AUTO: 0.6 % — SIGNIFICANT CHANGE UP (ref 0–6)
ERYTHROCYTE [SEDIMENTATION RATE] IN BLOOD: 18 MM/HR — SIGNIFICANT CHANGE UP
EXTRA LAVENDER TOP TUBE: SIGNIFICANT CHANGE UP
GAS PNL BLDV: SIGNIFICANT CHANGE UP
GLUCOSE BLDC GLUCOMTR-MCNC: 136 MG/DL — HIGH (ref 70–99)
GLUCOSE BLDC GLUCOMTR-MCNC: 184 MG/DL — HIGH (ref 70–99)
GLUCOSE BLDC GLUCOMTR-MCNC: 185 MG/DL — HIGH (ref 70–99)
GLUCOSE BLDC GLUCOMTR-MCNC: 205 MG/DL — HIGH (ref 70–99)
GLUCOSE BLDC GLUCOMTR-MCNC: 257 MG/DL — HIGH (ref 70–99)
GLUCOSE SERPL-MCNC: 201 MG/DL — HIGH (ref 70–99)
GLUCOSE SERPL-MCNC: 222 MG/DL — HIGH (ref 70–99)
GLUCOSE SERPL-MCNC: 377 MG/DL — HIGH (ref 70–99)
HAV IGM SER-ACNC: SIGNIFICANT CHANGE UP
HBV CORE IGM SER-ACNC: ABNORMAL
HBV SURFACE AG SER-ACNC: SIGNIFICANT CHANGE UP
HCO3 BLDA-SCNC: 25 MMOL/L — SIGNIFICANT CHANGE UP (ref 21–28)
HCO3 BLDA-SCNC: 29 MMOL/L — HIGH (ref 21–28)
HCO3 BLDV-SCNC: 28 MMOL/L — HIGH (ref 20–27)
HCT VFR BLD CALC: 37 % — SIGNIFICANT CHANGE UP (ref 34.5–45)
HCT VFR BLD CALC: 37.2 % — SIGNIFICANT CHANGE UP (ref 34.5–45)
HCT VFR BLD CALC: 41.6 % — SIGNIFICANT CHANGE UP (ref 34.5–45)
HCV AB S/CO SERPL IA: 0.09 S/CO — SIGNIFICANT CHANGE UP
HCV AB SERPL-IMP: SIGNIFICANT CHANGE UP
HGB BLD-MCNC: 12.2 G/DL — SIGNIFICANT CHANGE UP (ref 11.5–15.5)
HGB BLD-MCNC: 12.5 G/DL — SIGNIFICANT CHANGE UP (ref 11.5–15.5)
HGB BLD-MCNC: 12.9 G/DL — SIGNIFICANT CHANGE UP (ref 11.5–15.5)
IMM GRANULOCYTES NFR BLD AUTO: 0.4 % — SIGNIFICANT CHANGE UP (ref 0–1.5)
IMM GRANULOCYTES NFR BLD AUTO: 0.7 % — SIGNIFICANT CHANGE UP (ref 0–1.5)
INR BLD: 1.09 — SIGNIFICANT CHANGE UP (ref 0.88–1.16)
INR BLD: 1.17 — HIGH (ref 0.88–1.16)
LACTATE SERPL-SCNC: 1.2 MMOL/L — SIGNIFICANT CHANGE UP (ref 0.5–2)
LACTATE SERPL-SCNC: 1.4 MMOL/L — SIGNIFICANT CHANGE UP (ref 0.5–2)
LYMPHOCYTES # BLD AUTO: 33.8 % — SIGNIFICANT CHANGE UP (ref 13–44)
LYMPHOCYTES # BLD AUTO: 38.4 % — SIGNIFICANT CHANGE UP (ref 13–44)
LYMPHOCYTES # BLD AUTO: 4.17 K/UL — HIGH (ref 1–3.3)
LYMPHOCYTES # BLD AUTO: 4.3 K/UL — HIGH (ref 1–3.3)
MAGNESIUM SERPL-MCNC: 1.9 MG/DL — SIGNIFICANT CHANGE UP (ref 1.6–2.6)
MAGNESIUM SERPL-MCNC: 2.1 MG/DL — SIGNIFICANT CHANGE UP (ref 1.6–2.6)
MCHC RBC-ENTMCNC: 31 GM/DL — LOW (ref 32–36)
MCHC RBC-ENTMCNC: 31.2 PG — SIGNIFICANT CHANGE UP (ref 27–34)
MCHC RBC-ENTMCNC: 31.3 PG — SIGNIFICANT CHANGE UP (ref 27–34)
MCHC RBC-ENTMCNC: 31.4 PG — SIGNIFICANT CHANGE UP (ref 27–34)
MCHC RBC-ENTMCNC: 33 GM/DL — SIGNIFICANT CHANGE UP (ref 32–36)
MCHC RBC-ENTMCNC: 33.6 GM/DL — SIGNIFICANT CHANGE UP (ref 32–36)
MCV RBC AUTO: 100.5 FL — HIGH (ref 80–100)
MCV RBC AUTO: 93.2 FL — SIGNIFICANT CHANGE UP (ref 80–100)
MCV RBC AUTO: 95.1 FL — SIGNIFICANT CHANGE UP (ref 80–100)
MONOCYTES # BLD AUTO: 1.27 K/UL — HIGH (ref 0–0.9)
MONOCYTES # BLD AUTO: 1.37 K/UL — HIGH (ref 0–0.9)
MONOCYTES NFR BLD AUTO: 11.1 % — SIGNIFICANT CHANGE UP (ref 2–14)
MONOCYTES NFR BLD AUTO: 11.3 % — SIGNIFICANT CHANGE UP (ref 2–14)
NEUTROPHILS # BLD AUTO: 5.49 K/UL — SIGNIFICANT CHANGE UP (ref 1.8–7.4)
NEUTROPHILS # BLD AUTO: 6.66 K/UL — SIGNIFICANT CHANGE UP (ref 1.8–7.4)
NEUTROPHILS NFR BLD AUTO: 48.9 % — SIGNIFICANT CHANGE UP (ref 43–77)
NEUTROPHILS NFR BLD AUTO: 54 % — SIGNIFICANT CHANGE UP (ref 43–77)
NRBC # BLD: 0 /100 WBCS — SIGNIFICANT CHANGE UP (ref 0–0)
PCO2 BLDA: 43 MMHG — SIGNIFICANT CHANGE UP (ref 32–45)
PCO2 BLDA: 48 MMHG — HIGH (ref 32–45)
PCO2 BLDV: 51 MMHG — SIGNIFICANT CHANGE UP (ref 41–51)
PH BLDA: 7.38 — SIGNIFICANT CHANGE UP (ref 7.35–7.45)
PH BLDA: 7.4 — SIGNIFICANT CHANGE UP (ref 7.35–7.45)
PH BLDV: 7.35 — SIGNIFICANT CHANGE UP (ref 7.32–7.43)
PHOSPHATE SERPL-MCNC: 4.2 MG/DL — SIGNIFICANT CHANGE UP (ref 2.5–4.5)
PLATELET # BLD AUTO: 210 K/UL — SIGNIFICANT CHANGE UP (ref 150–400)
PLATELET # BLD AUTO: 245 K/UL — SIGNIFICANT CHANGE UP (ref 150–400)
PLATELET # BLD AUTO: 267 K/UL — SIGNIFICANT CHANGE UP (ref 150–400)
PO2 BLDA: 242 MMHG — HIGH (ref 83–108)
PO2 BLDA: 75 MMHG — LOW (ref 83–108)
PO2 BLDV: 53 MMHG — SIGNIFICANT CHANGE UP
POTASSIUM SERPL-MCNC: 3.4 MMOL/L — LOW (ref 3.5–5.3)
POTASSIUM SERPL-MCNC: 4.3 MMOL/L — SIGNIFICANT CHANGE UP (ref 3.5–5.3)
POTASSIUM SERPL-MCNC: 4.4 MMOL/L — SIGNIFICANT CHANGE UP (ref 3.5–5.3)
POTASSIUM SERPL-SCNC: 3.4 MMOL/L — LOW (ref 3.5–5.3)
POTASSIUM SERPL-SCNC: 4.3 MMOL/L — SIGNIFICANT CHANGE UP (ref 3.5–5.3)
POTASSIUM SERPL-SCNC: 4.4 MMOL/L — SIGNIFICANT CHANGE UP (ref 3.5–5.3)
PROT SERPL-MCNC: 6.8 G/DL — SIGNIFICANT CHANGE UP (ref 6–8.3)
PROT SERPL-MCNC: 7.2 G/DL — SIGNIFICANT CHANGE UP (ref 6–8.3)
PROTHROM AB SERPL-ACNC: 12.4 SEC — SIGNIFICANT CHANGE UP (ref 10–12.9)
PROTHROM AB SERPL-ACNC: 13.3 SEC — HIGH (ref 10–12.9)
RAPID RVP RESULT: SIGNIFICANT CHANGE UP
RBC # BLD: 3.89 M/UL — SIGNIFICANT CHANGE UP (ref 3.8–5.2)
RBC # BLD: 3.99 M/UL — SIGNIFICANT CHANGE UP (ref 3.8–5.2)
RBC # BLD: 4.14 M/UL — SIGNIFICANT CHANGE UP (ref 3.8–5.2)
RBC # FLD: 14.4 % — SIGNIFICANT CHANGE UP (ref 10.3–14.5)
RBC # FLD: 14.7 % — HIGH (ref 10.3–14.5)
RBC # FLD: 14.8 % — HIGH (ref 10.3–14.5)
SAO2 % BLDA: 100 % — SIGNIFICANT CHANGE UP (ref 95–100)
SAO2 % BLDA: 95 % — SIGNIFICANT CHANGE UP (ref 95–100)
SAO2 % BLDV: 83 % — SIGNIFICANT CHANGE UP
SODIUM SERPL-SCNC: 134 MMOL/L — LOW (ref 135–145)
SODIUM SERPL-SCNC: 138 MMOL/L — SIGNIFICANT CHANGE UP (ref 135–145)
SODIUM SERPL-SCNC: 138 MMOL/L — SIGNIFICANT CHANGE UP (ref 135–145)
TROPONIN T SERPL-MCNC: <0.01 NG/ML — SIGNIFICANT CHANGE UP (ref 0–0.01)
WBC # BLD: 11.21 K/UL — HIGH (ref 3.8–10.5)
WBC # BLD: 12.34 K/UL — HIGH (ref 3.8–10.5)
WBC # BLD: 20.84 K/UL — HIGH (ref 3.8–10.5)
WBC # FLD AUTO: 11.21 K/UL — HIGH (ref 3.8–10.5)
WBC # FLD AUTO: 12.34 K/UL — HIGH (ref 3.8–10.5)
WBC # FLD AUTO: 20.84 K/UL — HIGH (ref 3.8–10.5)

## 2019-04-27 PROCEDURE — 93306 TTE W/DOPPLER COMPLETE: CPT | Mod: 26

## 2019-04-27 PROCEDURE — 93458 L HRT ARTERY/VENTRICLE ANGIO: CPT | Mod: 26,XU

## 2019-04-27 PROCEDURE — 99291 CRITICAL CARE FIRST HOUR: CPT

## 2019-04-27 PROCEDURE — 71045 X-RAY EXAM CHEST 1 VIEW: CPT | Mod: 26,76

## 2019-04-27 PROCEDURE — 33967 INSERT I-AORT PERCUT DEVICE: CPT

## 2019-04-27 RX ORDER — MIDAZOLAM HYDROCHLORIDE 1 MG/ML
0.02 INJECTION, SOLUTION INTRAMUSCULAR; INTRAVENOUS
Qty: 100 | Refills: 0 | Status: DISCONTINUED | OUTPATIENT
Start: 2019-04-27 | End: 2019-04-27

## 2019-04-27 RX ORDER — FENTANYL CITRATE 50 UG/ML
1 INJECTION INTRAVENOUS
Qty: 2500 | Refills: 0 | Status: DISCONTINUED | OUTPATIENT
Start: 2019-04-27 | End: 2019-04-27

## 2019-04-27 RX ORDER — FENTANYL CITRATE 50 UG/ML
1 INJECTION INTRAVENOUS
Qty: 2500 | Refills: 0 | Status: DISCONTINUED | OUTPATIENT
Start: 2019-04-27 | End: 2019-04-29

## 2019-04-27 RX ORDER — HEPARIN SODIUM 5000 [USP'U]/ML
5000 INJECTION INTRAVENOUS; SUBCUTANEOUS EVERY 8 HOURS
Qty: 0 | Refills: 0 | Status: DISCONTINUED | OUTPATIENT
Start: 2019-04-27 | End: 2019-05-02

## 2019-04-27 RX ORDER — INSULIN LISPRO 100/ML
VIAL (ML) SUBCUTANEOUS
Qty: 0 | Refills: 0 | Status: DISCONTINUED | OUTPATIENT
Start: 2019-04-27 | End: 2019-04-29

## 2019-04-27 RX ORDER — ACETAMINOPHEN 500 MG
650 TABLET ORAL EVERY 6 HOURS
Qty: 0 | Refills: 0 | Status: DISCONTINUED | OUTPATIENT
Start: 2019-04-27 | End: 2019-04-29

## 2019-04-27 RX ORDER — AZITHROMYCIN 500 MG/1
500 TABLET, FILM COATED ORAL EVERY 24 HOURS
Qty: 0 | Refills: 0 | Status: DISCONTINUED | OUTPATIENT
Start: 2019-04-27 | End: 2019-04-28

## 2019-04-27 RX ORDER — POTASSIUM CHLORIDE 20 MEQ
10 PACKET (EA) ORAL
Qty: 0 | Refills: 0 | Status: DISCONTINUED | OUTPATIENT
Start: 2019-04-27 | End: 2019-04-27

## 2019-04-27 RX ORDER — POTASSIUM CHLORIDE 20 MEQ
40 PACKET (EA) ORAL EVERY 4 HOURS
Qty: 0 | Refills: 0 | Status: COMPLETED | OUTPATIENT
Start: 2019-04-27 | End: 2019-04-28

## 2019-04-27 RX ORDER — MORPHINE SULFATE 50 MG/1
1 CAPSULE, EXTENDED RELEASE ORAL ONCE
Qty: 0 | Refills: 0 | Status: DISCONTINUED | OUTPATIENT
Start: 2019-04-27 | End: 2019-04-26

## 2019-04-27 RX ORDER — MIDAZOLAM HYDROCHLORIDE 1 MG/ML
0.02 INJECTION, SOLUTION INTRAMUSCULAR; INTRAVENOUS
Qty: 100 | Refills: 0 | Status: DISCONTINUED | OUTPATIENT
Start: 2019-04-27 | End: 2019-04-29

## 2019-04-27 RX ORDER — PANTOPRAZOLE SODIUM 20 MG/1
40 TABLET, DELAYED RELEASE ORAL DAILY
Qty: 0 | Refills: 0 | Status: DISCONTINUED | OUTPATIENT
Start: 2019-04-27 | End: 2019-04-30

## 2019-04-27 RX ORDER — AMIODARONE HYDROCHLORIDE 400 MG/1
0.5 TABLET ORAL
Qty: 900 | Refills: 0 | Status: DISCONTINUED | OUTPATIENT
Start: 2019-04-27 | End: 2019-04-28

## 2019-04-27 RX ORDER — MAGNESIUM SULFATE 500 MG/ML
1 VIAL (ML) INJECTION ONCE
Qty: 0 | Refills: 0 | Status: COMPLETED | OUTPATIENT
Start: 2019-04-27 | End: 2019-04-27

## 2019-04-27 RX ORDER — CHLORHEXIDINE GLUCONATE 213 G/1000ML
1 SOLUTION TOPICAL
Qty: 0 | Refills: 0 | Status: DISCONTINUED | OUTPATIENT
Start: 2019-04-27 | End: 2019-05-01

## 2019-04-27 RX ORDER — AMIODARONE HYDROCHLORIDE 400 MG/1
1 TABLET ORAL
Qty: 900 | Refills: 0 | Status: DISCONTINUED | OUTPATIENT
Start: 2019-04-27 | End: 2019-04-28

## 2019-04-27 RX ORDER — FUROSEMIDE 40 MG
40 TABLET ORAL ONCE
Qty: 0 | Refills: 0 | Status: COMPLETED | OUTPATIENT
Start: 2019-04-27 | End: 2019-04-27

## 2019-04-27 RX ORDER — CEFTRIAXONE 500 MG/1
1 INJECTION, POWDER, FOR SOLUTION INTRAMUSCULAR; INTRAVENOUS EVERY 24 HOURS
Qty: 0 | Refills: 0 | Status: DISCONTINUED | OUTPATIENT
Start: 2019-04-27 | End: 2019-04-30

## 2019-04-27 RX ORDER — INSULIN LISPRO 100/ML
6 VIAL (ML) SUBCUTANEOUS ONCE
Qty: 0 | Refills: 0 | Status: DISCONTINUED | OUTPATIENT
Start: 2019-04-27 | End: 2019-04-27

## 2019-04-27 RX ADMIN — PANTOPRAZOLE SODIUM 40 MILLIGRAM(S): 20 TABLET, DELAYED RELEASE ORAL at 13:37

## 2019-04-27 RX ADMIN — MIDAZOLAM HYDROCHLORIDE 1.36 MG/KG/HR: 1 INJECTION, SOLUTION INTRAMUSCULAR; INTRAVENOUS at 06:18

## 2019-04-27 RX ADMIN — HEPARIN SODIUM 5000 UNIT(S): 5000 INJECTION INTRAVENOUS; SUBCUTANEOUS at 14:56

## 2019-04-27 RX ADMIN — Medication 650 MILLIGRAM(S): at 18:17

## 2019-04-27 RX ADMIN — FENTANYL CITRATE 6.8 MICROGRAM(S)/KG/HR: 50 INJECTION INTRAVENOUS at 10:30

## 2019-04-27 RX ADMIN — AMIODARONE HYDROCHLORIDE 33.33 MG/MIN: 400 TABLET ORAL at 13:37

## 2019-04-27 RX ADMIN — CLOPIDOGREL BISULFATE 75 MILLIGRAM(S): 75 TABLET, FILM COATED ORAL at 13:38

## 2019-04-27 RX ADMIN — AZITHROMYCIN 255 MILLIGRAM(S): 500 TABLET, FILM COATED ORAL at 06:10

## 2019-04-27 RX ADMIN — CEFTRIAXONE 100 GRAM(S): 500 INJECTION, POWDER, FOR SOLUTION INTRAMUSCULAR; INTRAVENOUS at 05:26

## 2019-04-27 RX ADMIN — Medication 100 GRAM(S): at 12:03

## 2019-04-27 RX ADMIN — Medication 650 MILLIGRAM(S): at 18:00

## 2019-04-27 RX ADMIN — Medication 40 MILLIGRAM(S): at 15:43

## 2019-04-27 RX ADMIN — Medication 6: at 07:54

## 2019-04-27 RX ADMIN — Medication 4: at 13:36

## 2019-04-27 RX ADMIN — Medication 2: at 22:16

## 2019-04-27 RX ADMIN — Medication 81 MILLIGRAM(S): at 13:37

## 2019-04-27 RX ADMIN — Medication 6.38 MICROGRAM(S)/KG/MIN: at 19:52

## 2019-04-27 RX ADMIN — HEPARIN SODIUM 5000 UNIT(S): 5000 INJECTION INTRAVENOUS; SUBCUTANEOUS at 22:18

## 2019-04-27 RX ADMIN — HEPARIN SODIUM 5000 UNIT(S): 5000 INJECTION INTRAVENOUS; SUBCUTANEOUS at 06:00

## 2019-04-27 RX ADMIN — ATORVASTATIN CALCIUM 40 MILLIGRAM(S): 80 TABLET, FILM COATED ORAL at 22:17

## 2019-04-27 NOTE — PROGRESS NOTE ADULT - PROBLEM SELECTOR PLAN 1
Pt with sustained symptomatic V-tach requiring cardioversion and intubation for airway protection, s/p cath with rule-out of ischemic/coronary etiology, cause remains unclear at this time, possible etiologies include ventricular myocardial scarring vs R-on-T phenomenon.  -cath with following findings: dLM= 20% pLAD= 40% (MLA 5.0 on IVUS), patent stent mLAD= non obstructive CAD Lcx= luminal irregularities RCA=moderately calcific, patent stent, minimal non obstructive CAD LVEDP= 14 mmHg LVEF= 35%  -EP consult in AM for possible AICD placement  -c/w amiodarone gtt, holding for bradycardia as needed

## 2019-04-27 NOTE — PROGRESS NOTE ADULT - SUBJECTIVE AND OBJECTIVE BOX
CHIEF COMPLAINT:  VF arrest    HISTORY OF PRESENT ILLNESS:    Ms. Hassan is a 78 y/o F with a history of HLD, HTN, NIDDM-II, right breast CA s/p lumpectomy and chemo (in remission) and CAD s/p PCI x 3 (last 2008 @ [facility]) who presented with exertional SOB x 2 weeks. Pt reports that she used to be able to walk 5 blocks but recently cannot walk a full block without stopping 2/2 SOB. Reports she had been having intermittent, substernal, 5/10, pressure-like CP both at rest and on exertion.  She also reports worsening LE edema. Labs significant for trop negative x 1, BNP 1141. EKG revealed SR @ 73bpm with IVCD and PVCs. CXR revealed right middle/lower lobe infiltrate vs atelectasis per verbal radiology read. Patient admitted to [facility] for further management of unstable angina.  Patient noted to have increasing amounts of polymorphic VT follow but sustained episodes of monomorphic VT requiring ACLS and event electrical cardioversion.   She underwent LHC which revealed patent stents and no new obstructive lesions.   Patient transferred to CCU and started on Amiodarone gtt.  No further events overnight      PAST MEDICAL & SURGICAL HISTORY:  CAD (coronary artery disease): s/p MI &amp; stent x1 in 05, x1 in 06 &amp; x1 in 08 in [facility]  Hx of breast cancer  Essential hypertension  Hypercholesterolemia  Diabetes mellitus  H/O lumpectomy: 2001  Status post coronary artery stent placement: x3, 2005, 2006, 2008  H/O: hysterectomy: 2008        MEDICATIONS:  norepinephrine Infusion 0.05 MICROgram(s)/kG/Min IV Continuous <Continuous>  azithromycin  IVPB 500 milliGRAM(s) IV Intermittent every 24 hours  cefTRIAXone   IVPB 1 Gram(s) IV Intermittent every 24 hours  fentaNYL   Infusion. 1 MICROgram(s)/kG/Hr IV Continuous <Continuous>  midazolam Infusion 0.02 mG/kG/Hr IV Continuous <Continuous>  pantoprazole  Injectable 40 milliGRAM(s) IV Push daily  atorvastatin 40 milliGRAM(s) Oral at bedtime  insulin lispro (HumaLOG) corrective regimen sliding scale   SubCutaneous Before meals and at bedtime  aspirin enteric coated 81 milliGRAM(s) Oral daily  clopidogrel Tablet 75 milliGRAM(s) Oral daily  heparin  Injectable 5000 Unit(s) SubCutaneous every 8 hours        ALLERGIES:  No Known Allergies        SOCIAL HISTORY:    Denies illicit drug use  Denies smoking history  Denies excessive alcohol intake        REVIEW OF SYSTEMS:  INTUBATED    PHYSICAL EXAM:  T(C): 36.7 (04-26-19 @ 20:56), Max: 36.8 (04-26-19 @ 15:27)  HR: 67 (04-27-19 @ 08:39) (50 - 207)  BP: 123/50 (04-27-19 @ 08:00) (76/51 - 147/79)  RR: 11 (04-27-19 @ 08:00) (0 - 38)  SpO2: 95% (04-27-19 @ 08:39) (80% - 100%)  Wt(kg): --    Appearance: Normal	  HEENT:   Normal oral mucosa, PERRL, EOMI	  Cardiovascular: Normal S1 S2, No JVD, No murmurs, No edema  Respiratory: Lungs clear to auscultation	  Gastrointestinal:  Soft, Non-tender, + BS	  Neurologic: A&O x 3, Non-focal  Extremities: Normal range of motion, No clubbing, cyanosis or edema  Vascular: Peripheral pulses palpable 2+ bilaterally    	  LABS:	 	    CARDIAC MARKERS:                                  12.6  12.34 )-----------( 245      ( 27 Apr 2019 04:03 )             37.0     04-27    138  |  104  |  18  ----------------------------<  201<H>  4.3   |  24  |  0.74    Ca    8.9      27 Apr 2019 04:03  Mg     2.1     04-26    TPro  6.8  /  Alb  3.7  /  TBili  0.6  /  DBili  x   /  AST  52<H>  /  ALT  56<H>  /  AlkPhos  59  04-27    proBNP: Serum Pro-Brain Natriuretic Peptide: 1141 pg/mL (04-26 @ 09:41)          PERTINENT DIAGNOSTIC TESTING:    Echocardiogram:  Pending  Telemetry:  NSR, infrequent PVCs   EKG:  NSR with IVCD (QRS 112ms) and frequent PVCs         ASSESSMENT/PLAN: 	  Ms. Hassan is a 78 y/o F with a history of HLD, HTN, NIDDM-II, right breast CA s/p lumpectomy and chemo (in remission) and CAD s/p PCI x 3 (last 2008 @ [facility]) who presented with exertional SOB x 2 weeks.  Found to have worsening EF and suffering a VF arrest.    -Patient will require a secondary prevention ICD prior to discharge  -Proceed with workup for newly-worsening EF (consider cardiac MRI)  -Maintain amio gtt for 24 hours as per protocol and then transition to amiodarone 400mg po bid  -When off pressor support, will slowly reintroduce beta blockade and ace inhibitor

## 2019-04-27 NOTE — PROGRESS NOTE ADULT - PROBLEM SELECTOR PLAN 2
Pt with significant jump in white count, afebrile but with low SVR suggesting septic etiology. Possibly 2/2 PNA given possible consolidation on CXR.  -Vanc + Zosyn for broad spectrum coverage  -f/u BCx, UA  -c/w Levo for pressure support Pt with significant jump in white count, afebrile but with low SVR suggesting possible septic etiology. Possibly 2/2 PNA given possible consolidation on CXR.  -f/u BCx, UA  -c/w Levo for pressure support Pt with significant jump in white count, afebrile but with low SVR suggesting possible septic etiology. Possibly 2/2 PNA given possible consolidation on CXR.  -Ceftriaxone + Azithromycin for PNA coverage  -f/u BCx, UA  -c/w Levo for pressure support

## 2019-04-27 NOTE — PROGRESS NOTE ADULT - SUBJECTIVE AND OBJECTIVE BOX
Events noted last night   Pt developed  respiratory distress and V Tachycardia , cardiogenic shock  requiring intubation   She was transferred to CCU   She underwent cardiac cath    Finding     D LM 20 %   P LAD   40 %   M Lad non obstructive CAD    Lcx  luminal irregularities   RCA non obstructive   LVEF 35 %   s/p placement of Intra aortic balloon     Pt sedated and intubated    PAST MEDICAL & SURGICAL HISTORY:  CAD (coronary artery disease): s/p MI &amp; stent x1 in 05, x1 in 06 &amp; x1 in 08 in [facility]  Hx of breast cancer  Essential hypertension  Hypercholesterolemia  Diabetes mellitus  H/O lumpectomy: 2001  Status post coronary artery stent placement: x3, 2005, 2006, 2008  H/O: hysterectomy: 2008    MEDICATIONS  (STANDING):  aspirin enteric coated 81 milliGRAM(s) Oral daily  atorvastatin 40 milliGRAM(s) Oral at bedtime  azithromycin  IVPB 500 milliGRAM(s) IV Intermittent every 24 hours  cefTRIAXone   IVPB 1 Gram(s) IV Intermittent every 24 hours  clopidogrel Tablet 75 milliGRAM(s) Oral daily  fentaNYL   Infusion. 1 MICROgram(s)/kG/Hr (6.8 mL/Hr) IV Continuous <Continuous>  heparin  Injectable 5000 Unit(s) SubCutaneous every 8 hours  insulin lispro (HumaLOG) corrective regimen sliding scale   SubCutaneous Before meals and at bedtime  midazolam Infusion 0.02 mG/kG/Hr (1.36 mL/Hr) IV Continuous <Continuous>  norepinephrine Infusion 0.05 MICROgram(s)/kG/Min (6.375 mL/Hr) IV Continuous <Continuous>  pantoprazole  Injectable 40 milliGRAM(s) IV Push daily    MEDICATIONS  (PRN):    ICU Vital Signs Last 24 Hrs  T(C): 36.7 (26 Apr 2019 20:56), Max: 36.8 (26 Apr 2019 15:27)  T(F): 98.1 (26 Apr 2019 20:56), Max: 98.2 (26 Apr 2019 15:27)  HR: 67 (27 Apr 2019 08:39) (50 - 207)  BP: 123/50 (27 Apr 2019 08:00) (76/51 - 147/79)  BP(mean): 73 (27 Apr 2019 08:00) (59 - 124)  ABP: 136/46 (27 Apr 2019 08:00) (122/44 - 136/46)  ABP(mean): 102 (27 Apr 2019 08:00) (88 - 102)  RR: 11 (27 Apr 2019 08:00) (0 - 38)  SpO2: 95% (27 Apr 2019 08:39) (80% - 100%)      lungs decreased breath sounds   CV s1 s2  abd soft  Ext stable                          12.2   12.34 )-----------( 245      ( 27 Apr 2019 04:03 )             37.0   04-27    138  |  104  |  18  ----------------------------<  201<H>  4.3   |  24  |  0.74    Ca    8.9      27 Apr 2019 04:03  Mg     2.1     04-26    TPro  6.8  /  Alb  3.7  /  TBili  0.6  /  DBili  x   /  AST  52<H>  /  ALT  56<H>  /  AlkPhos  59  04-27  CARDIAC MARKERS ( 26 Apr 2019 23:11 )  x     / <0.01 ng/mL / 25 U/L / x     / 1.0 ng/mL  CARDIAC MARKERS ( 26 Apr 2019 17:55 )  x     / <0.01 ng/mL / 27 U/L / x     / <1.0 ng/mL  CARDIAC MARKERS ( 26 Apr 2019 09:41 )  x     / <0.01 ng/mL / 33 U/L / x     / 1.3 ng/mL    CXR  congestion

## 2019-04-27 NOTE — PROGRESS NOTE ADULT - SUBJECTIVE AND OBJECTIVE BOX
**TRANSFER FROM 5U TO CCU**  HOSPITAL COURSE:  80yo F with FHx CAD and PMHx of HLD, HTN, NIDDM-II, right breast CA s/p lumpectomy and chemo (in remission) and CAD s/p PCI x 3 (last 2008 @ [facility]) who presented with exertional SOB x 2 weeks. Pt reports that she used to be able to walk 5 blocks but recently cannot walk a full block without stopping 2/2 SOB. Reports she has been having intermittent, substernal, 5/10, pressure-like CP both at rest and on exertion. She also reports worsening LE edema. Patient denies fever, chills, palpitations, PND, orthopnea, hematochezia, melena, dizziness, syncope. Vitals on arrival to the ED were Temp 98F, HR 93bpm, /72, RR 17, O2 sat 96% RA. Labs significant for trop negative x 1, BNP 1141. EKG revealed SR @ 73bpm with incomplete LBBB and PVCs. CXR revealed right middle/lower lobe infiltrate vs atelectasis per verbal radiology read. Patient is now admitted to [facility] for further management of unstable angina.    SUBJECTIVE: Patient seen and examined at bedside. Intubated and sedated on arrival, comfortable appearing, breathing comfortably.    OBJECTIVE:  T(C): 36.7 (26 Apr 2019 20:56), Max: 36.8 (26 Apr 2019 15:27)  T(F): 98.1 (26 Apr 2019 20:56), Max: 98.2 (26 Apr 2019 15:27)  HR: 60 (27 Apr 2019 00:45) (54 - 207)  BP: 114/54 (27 Apr 2019 00:20) (76/51 - 147/79)  BP(mean): 82 (27 Apr 2019 00:20) (59 - 124)  RR: 30 (27 Apr 2019 00:30) (17 - 38)  SpO2: 100% (27 Apr 2019 00:20) (80% - 100%)    Mode: AC/ CMV (Assist Control/ Continuous Mandatory Ventilation), RR (machine): 12, TV (machine): 450, FiO2: 100, PEEP: 5, ITime: 1, MAP: 9, PIP: 21    CAPILLARY BLOOD GLUCOSE  POCT Blood Glucose.: 221 mg/dL (26 Apr 2019 23:12)    PHYSICAL EXAM:      MEDICATIONS:  amiodarone Infusion 0.5 mG/Min (16.667 mL/Hr) IV Continuous <Continuous>  amLODIPine   Tablet 10 milliGRAM(s) Oral at bedtime  aspirin enteric coated 81 milliGRAM(s) Oral daily  atorvastatin 40 milliGRAM(s) Oral at bedtime  clopidogrel Tablet 75 milliGRAM(s) Oral daily  fluticasone propionate 50 MICROgram(s)/spray Nasal Spray 1 Spray(s) Both Nostrils two times a day  insulin lispro (HumaLOG) corrective regimen sliding scale   SubCutaneous Before meals and at bedtime  insulin lispro Injectable (HumaLOG) 6 Unit(s) SubCutaneous once  metoprolol succinate ER 50 milliGRAM(s) Oral daily  norepinephrine Infusion 0.05 MICROgram(s)/kG/Min (6.375 mL/Hr) IV Continuous <Continuous>  propofol Infusion 5 MICROgram(s)/kG/Min (2.04 mL/Hr) IV Continuous <Continuous>    ALLERGIES:  No Known Allergies    LABS:             12.9   20.84 )-----------( 267      ( 26 Apr 2019 23:46 )             41.6     04-26  138  |  102  |  16  ----------------------------<  377<H>  4.4   |  19<L>  |  0.77    Ca    9.5      26 Apr 2019 23:46    TPro  7.2  /  Alb  3.8  /  TBili  0.6  /  DBili  x   /  AST  38  /  ALT  41  /  AlkPhos  56  04-26    PT/INR - ( 26 Apr 2019 23:46 )   PT: 12.4 sec;   INR: 1.09     PTT - ( 26 Apr 2019 23:46 )  PTT:23.9 sec    RADIOLOGY & ADDITIONAL TESTS: Reviewed. **TRANSFER FROM 5U TO CCU**  HOSPITAL COURSE:  80yo F with FHx CAD and PMHx of HLD, HTN, NIDDM-II, right breast CA s/p lumpectomy and chemo (in remission) and CAD s/p PCI x 3 (last 2008 @ [facility]) who presented with exertional SOB x 2 weeks. Pt reports that she used to be able to walk 5 blocks but recently cannot walk a full block without stopping 2/2 SOB. Reports she has been having intermittent, substernal, 5/10, pressure-like CP both at rest and on exertion. She also reports worsening LE edema. Labs significant for trop negative x 1, BNP 1141. EKG revealed SR @ 73bpm with incomplete LBBB and PVCs. CXR revealed right middle/lower lobe infiltrate vs atelectasis per verbal radiology read. Patient admitted to [facility] for further management of unstable angina.    Providers to bedside ~11:09PM, initiated ACLS, pt shocked for symptomatic sustained Vtach. Immediately after shock SR was restored. BP after return to SR was 181/80, HR:70s, O2 sat: 82% on nonrebreather. 12 lead EKG SR@73BPM with incomplete LBBB, frequent PVCs. Patient began to develop agonal breathes despite nonrebreather. Patient was intubated to secure airway. Patient also noted to continue to have frequent PVCs on telemetry, STAT Amiodarone 150mg IV bolus followed by Amiodarone gtt was initiated. Patient stepped up to CCU for stabilization, underwent cardiac cath with IABP placement and the following findings: dLM= 20% pLAD= 40% (MLA 5.0 on IVUS), patent stent mLAD= non obstructive CAD Lcx= luminal irregularities RCA=moderately calcific, patent stent, minimal non obstructive CAD LVEDP= 14 mmHg LVEF= 35%. Now in CCU for further management.    SUBJECTIVE: Patient seen and examined at bedside. Intubated and sedated on arrival, comfortable appearing, breathing comfortably, IABP in place.    OBJECTIVE:  T(C): 36.7 (26 Apr 2019 20:56), Max: 36.8 (26 Apr 2019 15:27)  T(F): 98.1 (26 Apr 2019 20:56), Max: 98.2 (26 Apr 2019 15:27)  HR: 60 (27 Apr 2019 00:45) (54 - 207)  BP: 114/54 (27 Apr 2019 00:20) (76/51 - 147/79)  BP(mean): 82 (27 Apr 2019 00:20) (59 - 124)  RR: 30 (27 Apr 2019 00:30) (17 - 38)  SpO2: 100% (27 Apr 2019 00:20) (80% - 100%)    Mode: AC/ CMV (Assist Control/ Continuous Mandatory Ventilation), RR (machine): 12, TV (machine): 450, FiO2: 100, PEEP: 5, ITime: 1, MAP: 9, PIP: 21    CAPILLARY BLOOD GLUCOSE  POCT Blood Glucose.: 221 mg/dL (26 Apr 2019 23:12)    PHYSICAL EXAM:  Gen: WDWN elderly female, intubated and sedated, comfortable appearing  Head: NC/AT, PERRL, EOMI, anicteric sclera, no oropharyngeal erythema or exudates, MMM  Neck: supple; no JVD or thyromegaly  Respiratory: intubated, mechanical breath sounds, no W/R/R, no crackles  Cardiac: +S1/S2; RRR; no M/R/G  Gastrointestinal: soft, NT/ND; no rebound or guarding; +BSx4  Extremities: WWP, no clubbing or cyanosis; no peripheral edema  Vascular: 2+ radial, femoral, DP/PT pulses B/L, femoral access site c/d/i no induration or bleeding  Dermatologic: skin warm, dry and intact; no rashes, wounds, or scars  Lymphatic: no submandibular or cervical LAD  Neurologic: unable to assess as intubated, sedated    MEDICATIONS:  amiodarone Infusion 0.5 mG/Min (16.667 mL/Hr) IV Continuous <Continuous>  amLODIPine   Tablet 10 milliGRAM(s) Oral at bedtime  aspirin enteric coated 81 milliGRAM(s) Oral daily  atorvastatin 40 milliGRAM(s) Oral at bedtime  clopidogrel Tablet 75 milliGRAM(s) Oral daily  fluticasone propionate 50 MICROgram(s)/spray Nasal Spray 1 Spray(s) Both Nostrils two times a day  insulin lispro (HumaLOG) corrective regimen sliding scale   SubCutaneous Before meals and at bedtime  insulin lispro Injectable (HumaLOG) 6 Unit(s) SubCutaneous once  metoprolol succinate ER 50 milliGRAM(s) Oral daily  norepinephrine Infusion 0.05 MICROgram(s)/kG/Min (6.375 mL/Hr) IV Continuous <Continuous>  propofol Infusion 5 MICROgram(s)/kG/Min (2.04 mL/Hr) IV Continuous <Continuous>    ALLERGIES:  No Known Allergies    LABS:             12.9   20.84 )-----------( 267      ( 26 Apr 2019 23:46 )             41.6     04-26  138  |  102  |  16  ----------------------------<  377<H>  4.4   |  19<L>  |  0.77    Ca    9.5      26 Apr 2019 23:46    TPro  7.2  /  Alb  3.8  /  TBili  0.6  /  DBili  x   /  AST  38  /  ALT  41  /  AlkPhos  56  04-26    PT/INR - ( 26 Apr 2019 23:46 )   PT: 12.4 sec;   INR: 1.09     PTT - ( 26 Apr 2019 23:46 )  PTT:23.9 sec    RADIOLOGY & ADDITIONAL TESTS: Reviewed.

## 2019-04-27 NOTE — PROGRESS NOTE ADULT - SUBJECTIVE AND OBJECTIVE BOX
PROCEDURE: LHC, RHC, IABP placement  INDICATION: SHOCK, VT   ATTENDING: EVELYN   ACCESS: 8F RFA (IABP sutured), 7F RFV    BP 76/39 (off levophed)     FINDINGS:   dLM= 20%   pLAD= 40% (MLA 5.0 on IVUS), patent stent  mLAD= non obstructive CAD   Lcx= luminal irregularities  RCA=moderately calcific, patent stent, minimal non obstructive CAD  LVEDP= 14 mmHg  LVEF= 35%       RHC  RA=11  RV=39/12  PA=39/22  PW=18  CI= 3.79  CO= 6.71  SVR= 620    IABP sutured in place   Augmented /54    A/P  -non obstructive CAD, patent stents  -RHC pressures c/w septic shock  - CCU admit  -panculture  -remove IABP in am   -keep MAP > 65 mmHg, CVP 10

## 2019-04-27 NOTE — PROGRESS NOTE ADULT - ASSESSMENT
80yo F with FHx CAD and PMHx of HLD, HTN, NIDDM-II, right breast CA s/p lumpectomy and chemo (in remission) and CAD s/p PCI x 3 (last 2008 @ [facility]) who presented with exertional SOB x 2 weeks, admitted for unstable angina, now with episode of sustained symptomatic V-Tach requiring cardioversion, intubation and now s/p cardiac catheterization without stenting, plan for medical management.

## 2019-04-27 NOTE — CHART NOTE - NSCHARTNOTEFT_GEN_A_CORE
Called to patient’s bedside ~11:09PM, when arrived at bedside RN staff had already initiated ACLS and were amidst shocking patient for symptomatic sustained Vtach. Immediately after shock SR was restored. BP after return to SR was 181/80, HR:70s, O2 sat: 82% on nonrebreather. 12 lead EKG SR@73BPM with incomplete LBBB, frequent PVCs. Stat labs were drawn and second peripheral IV line was secured. Patient began to develop agonal breathes despite nonrebreather. CCU fellow Dr. Ibrahim at bedside, anesthesia was called and patient was intubated to secure airway. Patient also noted to continue to have frequent PVCs on telemetry, STAT Amiodarone 150mg IV bolus followed by Amiodarone gtt was initiated. Interventional fellow Dr. Vivek Pang made aware of event and plan for cardiac cath tonight as event likely ischemic VT. Patient stepped up to CCU for stabilization prior to cardiac catheterization tonight. Attending cardiologist Dr. Camp and patients daughter Melissa Contreras updated on patient’s status.

## 2019-04-28 LAB
ALBUMIN SERPL ELPH-MCNC: 2.6 G/DL — LOW (ref 3.3–5)
ALBUMIN SERPL ELPH-MCNC: 2.8 G/DL — LOW (ref 3.3–5)
ALBUMIN SERPL ELPH-MCNC: 3 G/DL — LOW (ref 3.3–5)
ALP SERPL-CCNC: 45 U/L — SIGNIFICANT CHANGE UP (ref 40–120)
ALP SERPL-CCNC: 60 U/L — SIGNIFICANT CHANGE UP (ref 40–120)
ALP SERPL-CCNC: 62 U/L — SIGNIFICANT CHANGE UP (ref 40–120)
ALT FLD-CCNC: 31 U/L — SIGNIFICANT CHANGE UP (ref 10–45)
ALT FLD-CCNC: 37 U/L — SIGNIFICANT CHANGE UP (ref 10–45)
ALT FLD-CCNC: 39 U/L — SIGNIFICANT CHANGE UP (ref 10–45)
ANION GAP SERPL CALC-SCNC: 10 MMOL/L — SIGNIFICANT CHANGE UP (ref 5–17)
ANION GAP SERPL CALC-SCNC: 12 MMOL/L — SIGNIFICANT CHANGE UP (ref 5–17)
ANION GAP SERPL CALC-SCNC: 9 MMOL/L — SIGNIFICANT CHANGE UP (ref 5–17)
APTT BLD: 28.2 SEC — SIGNIFICANT CHANGE UP (ref 27.5–36.3)
AST SERPL-CCNC: 17 U/L — SIGNIFICANT CHANGE UP (ref 10–40)
AST SERPL-CCNC: 21 U/L — SIGNIFICANT CHANGE UP (ref 10–40)
AST SERPL-CCNC: 24 U/L — SIGNIFICANT CHANGE UP (ref 10–40)
BASE EXCESS BLDA CALC-SCNC: -1.4 MMOL/L — SIGNIFICANT CHANGE UP (ref -2–3)
BASOPHILS # BLD AUTO: 0.03 K/UL — SIGNIFICANT CHANGE UP (ref 0–0.2)
BASOPHILS NFR BLD AUTO: 0.3 % — SIGNIFICANT CHANGE UP (ref 0–2)
BILIRUB SERPL-MCNC: 0.6 MG/DL — SIGNIFICANT CHANGE UP (ref 0.2–1.2)
BILIRUB SERPL-MCNC: 0.6 MG/DL — SIGNIFICANT CHANGE UP (ref 0.2–1.2)
BILIRUB SERPL-MCNC: 0.7 MG/DL — SIGNIFICANT CHANGE UP (ref 0.2–1.2)
BUN SERPL-MCNC: 15 MG/DL — SIGNIFICANT CHANGE UP (ref 7–23)
BUN SERPL-MCNC: 15 MG/DL — SIGNIFICANT CHANGE UP (ref 7–23)
BUN SERPL-MCNC: 16 MG/DL — SIGNIFICANT CHANGE UP (ref 7–23)
CALCIUM SERPL-MCNC: 8.4 MG/DL — SIGNIFICANT CHANGE UP (ref 8.4–10.5)
CALCIUM SERPL-MCNC: 8.7 MG/DL — SIGNIFICANT CHANGE UP (ref 8.4–10.5)
CALCIUM SERPL-MCNC: 8.8 MG/DL — SIGNIFICANT CHANGE UP (ref 8.4–10.5)
CHLORIDE SERPL-SCNC: 100 MMOL/L — SIGNIFICANT CHANGE UP (ref 96–108)
CHLORIDE SERPL-SCNC: 102 MMOL/L — SIGNIFICANT CHANGE UP (ref 96–108)
CHLORIDE SERPL-SCNC: 97 MMOL/L — SIGNIFICANT CHANGE UP (ref 96–108)
CO2 SERPL-SCNC: 21 MMOL/L — LOW (ref 22–31)
CO2 SERPL-SCNC: 22 MMOL/L — SIGNIFICANT CHANGE UP (ref 22–31)
CO2 SERPL-SCNC: 24 MMOL/L — SIGNIFICANT CHANGE UP (ref 22–31)
CREAT SERPL-MCNC: 0.56 MG/DL — SIGNIFICANT CHANGE UP (ref 0.5–1.3)
CREAT SERPL-MCNC: 0.56 MG/DL — SIGNIFICANT CHANGE UP (ref 0.5–1.3)
CREAT SERPL-MCNC: 0.61 MG/DL — SIGNIFICANT CHANGE UP (ref 0.5–1.3)
EOSINOPHIL # BLD AUTO: 0.07 K/UL — SIGNIFICANT CHANGE UP (ref 0–0.5)
EOSINOPHIL NFR BLD AUTO: 0.8 % — SIGNIFICANT CHANGE UP (ref 0–6)
EXTRA GREEN TOP TUBE: SIGNIFICANT CHANGE UP
GAS PNL BLDV: SIGNIFICANT CHANGE UP
GLUCOSE BLDC GLUCOMTR-MCNC: 141 MG/DL — HIGH (ref 70–99)
GLUCOSE BLDC GLUCOMTR-MCNC: 184 MG/DL — HIGH (ref 70–99)
GLUCOSE BLDC GLUCOMTR-MCNC: 190 MG/DL — HIGH (ref 70–99)
GLUCOSE BLDC GLUCOMTR-MCNC: 229 MG/DL — HIGH (ref 70–99)
GLUCOSE SERPL-MCNC: 162 MG/DL — HIGH (ref 70–99)
GLUCOSE SERPL-MCNC: 211 MG/DL — HIGH (ref 70–99)
GLUCOSE SERPL-MCNC: 298 MG/DL — HIGH (ref 70–99)
HAPTOGLOB SERPL-MCNC: 20 MG/DL — LOW (ref 34–200)
HCO3 BLDA-SCNC: 24 MMOL/L — SIGNIFICANT CHANGE UP (ref 21–28)
HCT VFR BLD CALC: 30.7 % — LOW (ref 34.5–45)
HCT VFR BLD CALC: 33.7 % — LOW (ref 34.5–45)
HCT VFR BLD CALC: 35.3 % — SIGNIFICANT CHANGE UP (ref 34.5–45)
HGB BLD-MCNC: 10 G/DL — LOW (ref 11.5–15.5)
HGB BLD-MCNC: 11.1 G/DL — LOW (ref 11.5–15.5)
HGB BLD-MCNC: 11.4 G/DL — LOW (ref 11.5–15.5)
IMM GRANULOCYTES NFR BLD AUTO: 0.3 % — SIGNIFICANT CHANGE UP (ref 0–1.5)
INR BLD: 1.15 — SIGNIFICANT CHANGE UP (ref 0.88–1.16)
LACTATE SERPL-SCNC: 0.9 MMOL/L — SIGNIFICANT CHANGE UP (ref 0.5–2)
LACTATE SERPL-SCNC: 1.1 MMOL/L — SIGNIFICANT CHANGE UP (ref 0.5–2)
LDH SERPL L TO P-CCNC: 272 U/L — HIGH (ref 50–242)
LYMPHOCYTES # BLD AUTO: 3.49 K/UL — HIGH (ref 1–3.3)
LYMPHOCYTES # BLD AUTO: 37.9 % — SIGNIFICANT CHANGE UP (ref 13–44)
MAGNESIUM SERPL-MCNC: 1.5 MG/DL — LOW (ref 1.6–2.6)
MAGNESIUM SERPL-MCNC: 2 MG/DL — SIGNIFICANT CHANGE UP (ref 1.6–2.6)
MAGNESIUM SERPL-MCNC: 2.5 MG/DL — SIGNIFICANT CHANGE UP (ref 1.6–2.6)
MCHC RBC-ENTMCNC: 30.6 PG — SIGNIFICANT CHANGE UP (ref 27–34)
MCHC RBC-ENTMCNC: 30.7 PG — SIGNIFICANT CHANGE UP (ref 27–34)
MCHC RBC-ENTMCNC: 30.8 PG — SIGNIFICANT CHANGE UP (ref 27–34)
MCHC RBC-ENTMCNC: 32.3 GM/DL — SIGNIFICANT CHANGE UP (ref 32–36)
MCHC RBC-ENTMCNC: 32.6 GM/DL — SIGNIFICANT CHANGE UP (ref 32–36)
MCHC RBC-ENTMCNC: 32.9 GM/DL — SIGNIFICANT CHANGE UP (ref 32–36)
MCV RBC AUTO: 93.4 FL — SIGNIFICANT CHANGE UP (ref 80–100)
MCV RBC AUTO: 94.5 FL — SIGNIFICANT CHANGE UP (ref 80–100)
MCV RBC AUTO: 94.6 FL — SIGNIFICANT CHANGE UP (ref 80–100)
MONOCYTES # BLD AUTO: 1.05 K/UL — HIGH (ref 0–0.9)
MONOCYTES NFR BLD AUTO: 11.4 % — SIGNIFICANT CHANGE UP (ref 2–14)
NEUTROPHILS # BLD AUTO: 4.54 K/UL — SIGNIFICANT CHANGE UP (ref 1.8–7.4)
NEUTROPHILS NFR BLD AUTO: 49.3 % — SIGNIFICANT CHANGE UP (ref 43–77)
NRBC # BLD: 0 /100 WBCS — SIGNIFICANT CHANGE UP (ref 0–0)
PCO2 BLDA: 41 MMHG — SIGNIFICANT CHANGE UP (ref 32–45)
PH BLDA: 7.38 — SIGNIFICANT CHANGE UP (ref 7.35–7.45)
PLATELET # BLD AUTO: 139 K/UL — LOW (ref 150–400)
PLATELET # BLD AUTO: 169 K/UL — SIGNIFICANT CHANGE UP (ref 150–400)
PLATELET # BLD AUTO: 182 K/UL — SIGNIFICANT CHANGE UP (ref 150–400)
PO2 BLDA: 104 MMHG — SIGNIFICANT CHANGE UP (ref 83–108)
POTASSIUM SERPL-MCNC: 4.2 MMOL/L — SIGNIFICANT CHANGE UP (ref 3.5–5.3)
POTASSIUM SERPL-MCNC: 4.2 MMOL/L — SIGNIFICANT CHANGE UP (ref 3.5–5.3)
POTASSIUM SERPL-MCNC: 4.5 MMOL/L — SIGNIFICANT CHANGE UP (ref 3.5–5.3)
POTASSIUM SERPL-SCNC: 4.2 MMOL/L — SIGNIFICANT CHANGE UP (ref 3.5–5.3)
POTASSIUM SERPL-SCNC: 4.2 MMOL/L — SIGNIFICANT CHANGE UP (ref 3.5–5.3)
POTASSIUM SERPL-SCNC: 4.5 MMOL/L — SIGNIFICANT CHANGE UP (ref 3.5–5.3)
PROT SERPL-MCNC: 5.8 G/DL — LOW (ref 6–8.3)
PROT SERPL-MCNC: 6.3 G/DL — SIGNIFICANT CHANGE UP (ref 6–8.3)
PROT SERPL-MCNC: 6.6 G/DL — SIGNIFICANT CHANGE UP (ref 6–8.3)
PROTHROM AB SERPL-ACNC: 13.1 SEC — HIGH (ref 10–12.9)
RBC # BLD: 3.25 M/UL — LOW (ref 3.8–5.2)
RBC # BLD: 3.61 M/UL — LOW (ref 3.8–5.2)
RBC # BLD: 3.73 M/UL — LOW (ref 3.8–5.2)
RBC # FLD: 14.6 % — HIGH (ref 10.3–14.5)
RBC # FLD: 14.7 % — HIGH (ref 10.3–14.5)
RBC # FLD: 14.7 % — HIGH (ref 10.3–14.5)
RHEUMATOID FACT SERPL-ACNC: 29 IU/ML — HIGH (ref 0–13)
SAO2 % BLDA: 98 % — SIGNIFICANT CHANGE UP (ref 95–100)
SODIUM SERPL-SCNC: 130 MMOL/L — LOW (ref 135–145)
SODIUM SERPL-SCNC: 132 MMOL/L — LOW (ref 135–145)
SODIUM SERPL-SCNC: 135 MMOL/L — SIGNIFICANT CHANGE UP (ref 135–145)
WBC # BLD: 6.95 K/UL — SIGNIFICANT CHANGE UP (ref 3.8–10.5)
WBC # BLD: 9.16 K/UL — SIGNIFICANT CHANGE UP (ref 3.8–10.5)
WBC # BLD: 9.21 K/UL — SIGNIFICANT CHANGE UP (ref 3.8–10.5)
WBC # FLD AUTO: 6.95 K/UL — SIGNIFICANT CHANGE UP (ref 3.8–10.5)
WBC # FLD AUTO: 9.16 K/UL — SIGNIFICANT CHANGE UP (ref 3.8–10.5)
WBC # FLD AUTO: 9.21 K/UL — SIGNIFICANT CHANGE UP (ref 3.8–10.5)

## 2019-04-28 PROCEDURE — 71045 X-RAY EXAM CHEST 1 VIEW: CPT | Mod: 26

## 2019-04-28 PROCEDURE — 99291 CRITICAL CARE FIRST HOUR: CPT

## 2019-04-28 RX ORDER — SODIUM CHLORIDE 9 MG/ML
250 INJECTION INTRAMUSCULAR; INTRAVENOUS; SUBCUTANEOUS ONCE
Qty: 0 | Refills: 0 | Status: COMPLETED | OUTPATIENT
Start: 2019-04-28 | End: 2019-04-28

## 2019-04-28 RX ORDER — AMIODARONE HYDROCHLORIDE 400 MG/1
400 TABLET ORAL EVERY 12 HOURS
Qty: 0 | Refills: 0 | Status: DISCONTINUED | OUTPATIENT
Start: 2019-04-28 | End: 2019-04-30

## 2019-04-28 RX ORDER — MAGNESIUM SULFATE 500 MG/ML
2 VIAL (ML) INJECTION ONCE
Qty: 0 | Refills: 0 | Status: DISCONTINUED | OUTPATIENT
Start: 2019-04-28 | End: 2019-04-28

## 2019-04-28 RX ORDER — INSULIN GLARGINE 100 [IU]/ML
8 INJECTION, SOLUTION SUBCUTANEOUS AT BEDTIME
Qty: 0 | Refills: 0 | Status: DISCONTINUED | OUTPATIENT
Start: 2019-04-28 | End: 2019-05-02

## 2019-04-28 RX ORDER — ACETAMINOPHEN 500 MG
1000 TABLET ORAL ONCE
Qty: 0 | Refills: 0 | Status: COMPLETED | OUTPATIENT
Start: 2019-04-28 | End: 2019-04-28

## 2019-04-28 RX ORDER — ACETAMINOPHEN 500 MG
975 TABLET ORAL ONCE
Qty: 0 | Refills: 0 | Status: COMPLETED | OUTPATIENT
Start: 2019-04-28 | End: 2019-04-28

## 2019-04-28 RX ORDER — ASPIRIN/CALCIUM CARB/MAGNESIUM 324 MG
81 TABLET ORAL DAILY
Qty: 0 | Refills: 0 | Status: DISCONTINUED | OUTPATIENT
Start: 2019-04-28 | End: 2019-04-28

## 2019-04-28 RX ORDER — ASPIRIN/CALCIUM CARB/MAGNESIUM 324 MG
81 TABLET ORAL EVERY 24 HOURS
Qty: 0 | Refills: 0 | Status: DISCONTINUED | OUTPATIENT
Start: 2019-04-29 | End: 2019-05-02

## 2019-04-28 RX ORDER — AMIODARONE HYDROCHLORIDE 400 MG/1
400 TABLET ORAL EVERY 12 HOURS
Qty: 0 | Refills: 0 | Status: DISCONTINUED | OUTPATIENT
Start: 2019-04-28 | End: 2019-04-28

## 2019-04-28 RX ORDER — ASPIRIN/CALCIUM CARB/MAGNESIUM 324 MG
81 TABLET ORAL ONCE
Qty: 0 | Refills: 0 | Status: COMPLETED | OUTPATIENT
Start: 2019-04-28 | End: 2019-04-28

## 2019-04-28 RX ORDER — MAGNESIUM SULFATE 500 MG/ML
2 VIAL (ML) INJECTION ONCE
Qty: 0 | Refills: 0 | Status: COMPLETED | OUTPATIENT
Start: 2019-04-28 | End: 2019-04-28

## 2019-04-28 RX ADMIN — CEFTRIAXONE 100 GRAM(S): 500 INJECTION, POWDER, FOR SOLUTION INTRAMUSCULAR; INTRAVENOUS at 04:00

## 2019-04-28 RX ADMIN — INSULIN GLARGINE 8 UNIT(S): 100 INJECTION, SOLUTION SUBCUTANEOUS at 22:10

## 2019-04-28 RX ADMIN — Medication 2: at 17:05

## 2019-04-28 RX ADMIN — HEPARIN SODIUM 5000 UNIT(S): 5000 INJECTION INTRAVENOUS; SUBCUTANEOUS at 22:10

## 2019-04-28 RX ADMIN — SODIUM CHLORIDE 83.33 MILLILITER(S): 9 INJECTION INTRAMUSCULAR; INTRAVENOUS; SUBCUTANEOUS at 20:00

## 2019-04-28 RX ADMIN — AMIODARONE HYDROCHLORIDE 400 MILLIGRAM(S): 400 TABLET ORAL at 14:05

## 2019-04-28 RX ADMIN — MIDAZOLAM HYDROCHLORIDE 1.36 MG/KG/HR: 1 INJECTION, SOLUTION INTRAMUSCULAR; INTRAVENOUS at 20:41

## 2019-04-28 RX ADMIN — Medication 975 MILLIGRAM(S): at 15:05

## 2019-04-28 RX ADMIN — Medication 6.38 MICROGRAM(S)/KG/MIN: at 06:52

## 2019-04-28 RX ADMIN — Medication 975 MILLIGRAM(S): at 14:05

## 2019-04-28 RX ADMIN — SODIUM CHLORIDE 83.33 MILLILITER(S): 9 INJECTION INTRAMUSCULAR; INTRAVENOUS; SUBCUTANEOUS at 23:22

## 2019-04-28 RX ADMIN — ATORVASTATIN CALCIUM 40 MILLIGRAM(S): 80 TABLET, FILM COATED ORAL at 22:10

## 2019-04-28 RX ADMIN — Medication 2: at 11:40

## 2019-04-28 RX ADMIN — AZITHROMYCIN 255 MILLIGRAM(S): 500 TABLET, FILM COATED ORAL at 04:00

## 2019-04-28 RX ADMIN — Medication 81 MILLIGRAM(S): at 14:06

## 2019-04-28 RX ADMIN — CHLORHEXIDINE GLUCONATE 1 APPLICATION(S): 213 SOLUTION TOPICAL at 06:33

## 2019-04-28 RX ADMIN — FENTANYL CITRATE 6.8 MICROGRAM(S)/KG/HR: 50 INJECTION INTRAVENOUS at 03:33

## 2019-04-28 RX ADMIN — Medication 40 MILLIEQUIVALENT(S): at 06:54

## 2019-04-28 RX ADMIN — CLOPIDOGREL BISULFATE 75 MILLIGRAM(S): 75 TABLET, FILM COATED ORAL at 11:40

## 2019-04-28 RX ADMIN — Medication 40 MILLIEQUIVALENT(S): at 03:32

## 2019-04-28 RX ADMIN — HEPARIN SODIUM 5000 UNIT(S): 5000 INJECTION INTRAVENOUS; SUBCUTANEOUS at 06:53

## 2019-04-28 RX ADMIN — Medication 400 MILLIGRAM(S): at 06:57

## 2019-04-28 RX ADMIN — Medication 1000 MILLIGRAM(S): at 07:32

## 2019-04-28 RX ADMIN — Medication 4: at 07:32

## 2019-04-28 RX ADMIN — PANTOPRAZOLE SODIUM 40 MILLIGRAM(S): 20 TABLET, DELAYED RELEASE ORAL at 11:40

## 2019-04-28 RX ADMIN — Medication 50 GRAM(S): at 10:03

## 2019-04-28 RX ADMIN — HEPARIN SODIUM 5000 UNIT(S): 5000 INJECTION INTRAVENOUS; SUBCUTANEOUS at 14:06

## 2019-04-28 NOTE — PROGRESS NOTE ADULT - PROBLEM SELECTOR PLAN 2
Pt with significant jump in white count post-code (possibly reactive), fever to 100.6. Lower SVR suggesting possible septic etiology, most likely would be CAP vs. aspiration PNA.   -Ceftriaxone (4/27 - )  -d/c azithro (4/27) due to QT prolongation on amiodarone, less likely atypical  -f/u blood cultures  -will attempt to wean off levophed today

## 2019-04-28 NOTE — PROGRESS NOTE ADULT - ASSESSMENT
78yo F with FHx CAD and PMHx of HLD, HTN, NIDDM-II, right breast CA s/p lumpectomy and chemo (in remission) and CAD s/p PCI x 3 (last 2008 @ [facility]) who presented with exertional SOB x 2 weeks, admitted for unstable angina, with course c/b episode of sustained symptomatic V-Tach requiring cardioversion and intubation, now s/p cardiac catheterization with no significant coronary disease. She remains intubated with irregular rhythm improving on amiodarone.

## 2019-04-28 NOTE — PROGRESS NOTE ADULT - SUBJECTIVE AND OBJECTIVE BOX
OVERNIGHT EVENTS: Patient spiked a fever to 100.6 and was cultured and given Tylenol.     SUBJECTIVE / INTERVAL HPI: Patient seen and examined at bedside. Yesterday the patient was started on amiodarone gtt load with improvement in her heart rhythm -- she was noted to have frequent junctional beats and irregular rhythm. She was on fentanyl and versed for sedation, changed from propofol due to bradycardia. She continued on IABP due to lower BPs and needed levophed gtt to maintain MAPs > 65.    VITAL SIGNS:  Vital Signs Last 24 Hrs  T(C): 37.2 (28 Apr 2019 09:00), Max: 38.1 (27 Apr 2019 18:00)  T(F): 98.9 (28 Apr 2019 09:00), Max: 100.6 (27 Apr 2019 18:00)  HR: 56 (28 Apr 2019 08:00) (50 - 84)  BP: 119/55 (28 Apr 2019 07:00) (83/53 - 141/84)  BP(mean): 43 (28 Apr 2019 07:00) (43 - 112)  RR: 12 (28 Apr 2019 08:00) (9 - 20)  SpO2: 98% (28 Apr 2019 08:00) (93% - 99%)    04-27-19 @ 07:01 - 04-28-19 @ 07:00  --------------------------------------------------------  IN: 1939.9 mL / OUT: 2890 mL / NET: -950.1 mL    04-28-19 @ 07:01 - 04-28-19 @ 10:47  --------------------------------------------------------  IN: 52.3 mL / OUT: 20 mL / NET: 32.3 mL    Mode: AC/ CMV (Assist Control/ Continuous Mandatory Ventilation), RR (machine): 12, TV (machine): 450, FiO2: 40, PEEP: 5, ITime: 1, MAP: 9.4, PIP: 22    PHYSICAL EXAM:  General: WDWN, intubated and sedated, no acute distress  HEENT: NC/AT; MMM  Neck: supple, no JVD  Cardiovascular: +S1/S2, RRR, no m/r/g. +IABP sounds  Respiratory: CTA B/L; no W/R/R  Gastrointestinal: soft, NT/ND  Extremities: WWP; no edema, clubbing or cyanosis  Vascular: 2+ radial, DP/PT pulses B/L  Neurological: intubated and sedated    MEDICATIONS:  MEDICATIONS  (STANDING):  amiodarone Infusion 1 mG/Min (33.333 mL/Hr) IV Continuous <Continuous>  amiodarone Infusion 0.5 mG/Min (16.667 mL/Hr) IV Continuous <Continuous>  aspirin enteric coated 81 milliGRAM(s) Oral daily  atorvastatin 40 milliGRAM(s) Oral at bedtime  azithromycin  IVPB 500 milliGRAM(s) IV Intermittent every 24 hours  cefTRIAXone   IVPB 1 Gram(s) IV Intermittent every 24 hours  chlorhexidine 2% Cloths 1 Application(s) Topical <User Schedule>  clopidogrel Tablet 75 milliGRAM(s) Oral daily  fentaNYL   Infusion. 1 MICROgram(s)/kG/Hr (6.8 mL/Hr) IV Continuous <Continuous>  heparin  Injectable 5000 Unit(s) SubCutaneous every 8 hours  insulin lispro (HumaLOG) corrective regimen sliding scale   SubCutaneous Before meals and at bedtime  midazolam Infusion 0.02 mG/kG/Hr (1.36 mL/Hr) IV Continuous <Continuous>  norepinephrine Infusion 0.05 MICROgram(s)/kG/Min (6.375 mL/Hr) IV Continuous <Continuous>  pantoprazole  Injectable 40 milliGRAM(s) IV Push daily    MEDICATIONS  (PRN):  acetaminophen   Tablet .. 650 milliGRAM(s) Oral every 6 hours PRN Temp greater or equal to 38C (100.4F)    ALLERGIES:  Allergies  No Known Allergies    LABS:                        11.4   9.21  )-----------( 182      ( 28 Apr 2019 06:42 )             35.3     04-28    130<L>  |  97  |  15  ----------------------------<  298<H>  4.2   |  21<L>  |  0.56    Ca    8.7      28 Apr 2019 06:42  Phos  4.2     04-27  Mg     1.5     04-28    TPro  6.6  /  Alb  3.0<L>  /  TBili  0.6  /  DBili  x   /  AST  24  /  ALT  39  /  AlkPhos  62  04-28    PT/INR - ( 28 Apr 2019 06:42 )   PT: 13.1 sec;   INR: 1.15     PTT - ( 28 Apr 2019 06:42 )  PTT:28.2 sec    CAPILLARY BLOOD GLUCOSE  POCT Blood Glucose.: 229 mg/dL (28 Apr 2019 07:02)    Culture - Blood (collected 04-27-19 @ 05:38)  Source: .Blood Blood  Preliminary Report (04-28-19 @ 06:00):    No growth at 1 day.    Culture - Blood (collected 04-27-19 @ 05:38)  Source: .Blood Blood  Preliminary Report (04-28-19 @ 06:00):    No growth at 1 day.    RADIOLOGY & ADDITIONAL TESTS: Reviewed.  EKG: NSR, inferior Q waves, L axis deviation  CXR: small bilateral pleural effusions but otherwise lungs clear

## 2019-04-28 NOTE — PROGRESS NOTE ADULT - ASSESSMENT
s/p Ventricular Arrythmia   Non Obstructive CAD     respiratory insufficiency     L Raifman    cont RX

## 2019-04-28 NOTE — PROGRESS NOTE ADULT - SUBJECTIVE AND OBJECTIVE BOX
EPS Progress Note    S:  No new events overnight.  Remains intubated and IABP remains in place.  T(C): 37.2 (04-28-19 @ 09:00), Max: 38.1 (04-27-19 @ 18:00)  HR: 58 (04-28-19 @ 14:00) (50 - 84)  BP: 124/60 (04-28-19 @ 14:00) (83/53 - 141/84)  RR: 11 (04-28-19 @ 14:00) (9 - 20)  SpO2: 98% (04-28-19 @ 14:00) (93% - 99%)  Wt(kg): --     Telemetry:   NSR, infrequent PVCs, rare NSVT          General:  No acute distress      Chest:  Chest is clear to auscultation bilaterally without wheezes, crackles, or rhonchi  Cardiac:  Regular rate and rhythm.  No murmur, rubs, or gallops heard.  Abdomen:  Soft without rebound or guarding.  Bowel sounds are presnt in all 4 quadrants.  No hepatosplenomegaly  Extremities:  No lower extremity edema is present.  No cyanosis or clubbing       MEDICATIONS  (STANDING):  amiodarone    Tablet 400 milliGRAM(s) Oral every 12 hours  amiodarone Infusion 1 mG/Min (33.333 mL/Hr) IV Continuous <Continuous>  amiodarone Infusion 0.5 mG/Min (16.667 mL/Hr) IV Continuous <Continuous>  atorvastatin 40 milliGRAM(s) Oral at bedtime  cefTRIAXone   IVPB 1 Gram(s) IV Intermittent every 24 hours  chlorhexidine 2% Cloths 1 Application(s) Topical <User Schedule>  clopidogrel Tablet 75 milliGRAM(s) Oral daily  fentaNYL   Infusion. 1 MICROgram(s)/kG/Hr (6.8 mL/Hr) IV Continuous <Continuous>  heparin  Injectable 5000 Unit(s) SubCutaneous every 8 hours  insulin lispro (HumaLOG) corrective regimen sliding scale   SubCutaneous Before meals and at bedtime  midazolam Infusion 0.02 mG/kG/Hr (1.36 mL/Hr) IV Continuous <Continuous>  norepinephrine Infusion 0.05 MICROgram(s)/kG/Min (6.375 mL/Hr) IV Continuous <Continuous>  pantoprazole  Injectable 40 milliGRAM(s) IV Push daily    MEDICATIONS  (PRN):  acetaminophen   Tablet .. 650 milliGRAM(s) Oral every 6 hours PRN Temp greater or equal to 38C (100.4F)                                                         11.1   9.16  )-----------( 169      ( 28 Apr 2019 11:11 )             33.7     04-28    132<L>  |  100  |  15  ----------------------------<  211<H>  4.2   |  22  |  0.61    Ca    8.8      28 Apr 2019 11:11  Phos  4.2     04-27  Mg     2.5     04-28    TPro  6.3  /  Alb  2.6<L>  /  TBili  0.6  /  DBili  x   /  AST  21  /  ALT  37  /  AlkPhos  60  04-28    PT/INR - ( 28 Apr 2019 06:42 )   PT: 13.1 sec;   INR: 1.15          PTT - ( 28 Apr 2019 06:42 )  PTT:28.2 sec    Assessment/Plan:  Ms. Hassan is a 78 y/o F with a history of HLD, HTN, NIDDM-II, right breast CA s/p lumpectomy and chemo (in remission) and CAD s/p PCI x 3 (last 2008 @ [facility]) who presented with exertional SOB x 2 weeks.  Found to have worsening EF and suffering a VF arrest.    -Patient will require a secondary prevention ICD prior to discharge  -Proceed with workup for newly-worsening EF (agree with cardiac MRI  -Agree with amio 400mg bid  -When off pressor support, will slowly reintroduce beta blockade and ace inhibitor

## 2019-04-28 NOTE — PROGRESS NOTE ADULT - PROBLEM SELECTOR PLAN 1
Pt with sustained symptomatic V-tach requiring cardioversion and intubation for airway protection, s/p cath with rule-out of ischemic/coronary etiology, cause remains unclear at this time, possible etiologies include ventricular myocardial scarring vs R-on-T phenomenon. Ischemic etiology unlikely with cath showing: dLM= 20% pLAD= 40% (MLA 5.0 on IVUS), patent stent mLAD= non obstructive CAD Lcx= luminal irregularities RCA=moderately calcific, patent stent, minimal non obstructive CAD.  -EP consulted, consider AICD  -continue amiodarone load -- transition from 0.5 mg/min --> 400 mg q12 PO today  -plan for cardiac MRI to continue workup once stabilized and extubated  -IABP in place currently -- plan to d/c today if stable on lower doses of levophed  -sedation holiday today

## 2019-04-28 NOTE — PROGRESS NOTE ADULT - SUBJECTIVE AND OBJECTIVE BOX
Pt remains on Vent support      s/p Ventricular arrythmia  hypotension  Respiratory insufficiency    Non obstructive CAD on Cath     PAST MEDICAL & SURGICAL HISTORY:  CAD (coronary artery disease): s/p MI &amp; stent x1 in 05, x1 in 06 &amp; x1 in 08 in [facility]  Hx of breast cancer  Essential hypertension  Hypercholesterolemia  Diabetes mellitus  H/O lumpectomy: 2001  Status post coronary artery stent placement: x3, 2005, 2006, 2008  H/O: hysterectomy: 2008    MEDICATIONS  (STANDING):  amiodarone Infusion 1 mG/Min (33.333 mL/Hr) IV Continuous <Continuous>  amiodarone Infusion 0.5 mG/Min (16.667 mL/Hr) IV Continuous <Continuous>  atorvastatin 40 milliGRAM(s) Oral at bedtime  cefTRIAXone   IVPB 1 Gram(s) IV Intermittent every 24 hours  chlorhexidine 2% Cloths 1 Application(s) Topical <User Schedule>  clopidogrel Tablet 75 milliGRAM(s) Oral daily  fentaNYL   Infusion. 1 MICROgram(s)/kG/Hr (6.8 mL/Hr) IV Continuous <Continuous>  heparin  Injectable 5000 Unit(s) SubCutaneous every 8 hours  insulin lispro (HumaLOG) corrective regimen sliding scale   SubCutaneous Before meals and at bedtime  midazolam Infusion 0.02 mG/kG/Hr (1.36 mL/Hr) IV Continuous <Continuous>  norepinephrine Infusion 0.05 MICROgram(s)/kG/Min (6.375 mL/Hr) IV Continuous <Continuous>  pantoprazole  Injectable 40 milliGRAM(s) IV Push daily    MEDICATIONS  (PRN):  acetaminophen   Tablet .. 650 milliGRAM(s) Oral every 6 hours PRN Temp greater or equal to 38C (100.4F)      Pt intubated     Lungs decreased breath sounds at bases  CV s1 s2   abd soft   ext stable                          11.1   9.16  )-----------( 169      ( 28 Apr 2019 11:11 )             33.7   04-28    132<L>  |  100  |  15  ----------------------------<  211<H>  4.2   |  22  |  0.61    Ca    8.8      28 Apr 2019 11:11  Phos  4.2     04-27  Mg     2.5     04-28    TPro  6.3  /  Alb  2.6<L>  /  TBili  0.6  /  DBili  x   /  AST  21  /  ALT  37  /  AlkPhos  60  04-28      Chest x ray improved CHF     Echo    EF 35 %

## 2019-04-29 DIAGNOSIS — I50.20 UNSPECIFIED SYSTOLIC (CONGESTIVE) HEART FAILURE: ICD-10-CM

## 2019-04-29 LAB
ANION GAP SERPL CALC-SCNC: 8 MMOL/L — SIGNIFICANT CHANGE UP (ref 5–17)
APPEARANCE UR: ABNORMAL
AUTO DIFF PNL BLD: NEGATIVE — SIGNIFICANT CHANGE UP
BILIRUB UR-MCNC: NEGATIVE — SIGNIFICANT CHANGE UP
BUN SERPL-MCNC: 15 MG/DL — SIGNIFICANT CHANGE UP (ref 7–23)
C-ANCA SER-ACNC: NEGATIVE — SIGNIFICANT CHANGE UP
CALCIUM SERPL-MCNC: 8.5 MG/DL — SIGNIFICANT CHANGE UP (ref 8.4–10.5)
CHLORIDE SERPL-SCNC: 98 MMOL/L — SIGNIFICANT CHANGE UP (ref 96–108)
CO2 SERPL-SCNC: 24 MMOL/L — SIGNIFICANT CHANGE UP (ref 22–31)
COLOR SPEC: ABNORMAL
CREAT ?TM UR-MCNC: 33 MG/DL — SIGNIFICANT CHANGE UP
CREAT SERPL-MCNC: 0.56 MG/DL — SIGNIFICANT CHANGE UP (ref 0.5–1.3)
DIFF PNL FLD: ABNORMAL
DSDNA AB SER-ACNC: 141 IU/ML — HIGH
GLUCOSE BLDC GLUCOMTR-MCNC: 123 MG/DL — HIGH (ref 70–99)
GLUCOSE BLDC GLUCOMTR-MCNC: 130 MG/DL — HIGH (ref 70–99)
GLUCOSE BLDC GLUCOMTR-MCNC: 132 MG/DL — HIGH (ref 70–99)
GLUCOSE BLDC GLUCOMTR-MCNC: 88 MG/DL — SIGNIFICANT CHANGE UP (ref 70–99)
GLUCOSE SERPL-MCNC: 110 MG/DL — HIGH (ref 70–99)
GLUCOSE UR QL: 100
HBA1C BLD-MCNC: 7.9 % — HIGH (ref 4–5.6)
HCT VFR BLD CALC: 30.4 % — LOW (ref 34.5–45)
HGB BLD-MCNC: 9.9 G/DL — LOW (ref 11.5–15.5)
KETONES UR-MCNC: 40 MG/DL
LEGIONELLA AG UR QL: NEGATIVE — SIGNIFICANT CHANGE UP
LEUKOCYTE ESTERASE UR-ACNC: ABNORMAL
MAGNESIUM SERPL-MCNC: 2.2 MG/DL — SIGNIFICANT CHANGE UP (ref 1.6–2.6)
MCHC RBC-ENTMCNC: 30.9 PG — SIGNIFICANT CHANGE UP (ref 27–34)
MCHC RBC-ENTMCNC: 32.6 GM/DL — SIGNIFICANT CHANGE UP (ref 32–36)
MCV RBC AUTO: 95 FL — SIGNIFICANT CHANGE UP (ref 80–100)
NITRITE UR-MCNC: POSITIVE
NRBC # BLD: 0 /100 WBCS — SIGNIFICANT CHANGE UP (ref 0–0)
OSMOLALITY UR: 382 MOSMOL/KG — SIGNIFICANT CHANGE UP (ref 100–650)
P-ANCA SER-ACNC: NEGATIVE — SIGNIFICANT CHANGE UP
PH UR: 5.5 — SIGNIFICANT CHANGE UP (ref 5–8)
PHOSPHATE SERPL-MCNC: 2.8 MG/DL — SIGNIFICANT CHANGE UP (ref 2.5–4.5)
PLATELET # BLD AUTO: 135 K/UL — LOW (ref 150–400)
POTASSIUM SERPL-MCNC: 4 MMOL/L — SIGNIFICANT CHANGE UP (ref 3.5–5.3)
POTASSIUM SERPL-SCNC: 4 MMOL/L — SIGNIFICANT CHANGE UP (ref 3.5–5.3)
PROT ?TM UR-MCNC: 62 MG/DL — HIGH (ref 0–12)
PROT UR-MCNC: >=300 MG/DL
PROT/CREAT UR-RTO: 1.9 RATIO — HIGH (ref 0–0.2)
RBC # BLD: 3.2 M/UL — LOW (ref 3.8–5.2)
RBC # FLD: 14.6 % — HIGH (ref 10.3–14.5)
SODIUM SERPL-SCNC: 130 MMOL/L — LOW (ref 135–145)
SODIUM UR-SCNC: 53 MMOL/L — SIGNIFICANT CHANGE UP
SP GR SPEC: 1.02 — SIGNIFICANT CHANGE UP (ref 1–1.03)
UROBILINOGEN FLD QL: 0.2 E.U./DL — SIGNIFICANT CHANGE UP
UUN UR-MCNC: 483 MG/DL — SIGNIFICANT CHANGE UP
WBC # BLD: 6.1 K/UL — SIGNIFICANT CHANGE UP (ref 3.8–10.5)
WBC # FLD AUTO: 6.1 K/UL — SIGNIFICANT CHANGE UP (ref 3.8–10.5)

## 2019-04-29 PROCEDURE — 71045 X-RAY EXAM CHEST 1 VIEW: CPT | Mod: 26

## 2019-04-29 PROCEDURE — 99291 CRITICAL CARE FIRST HOUR: CPT

## 2019-04-29 RX ORDER — CHLORHEXIDINE GLUCONATE 213 G/1000ML
15 SOLUTION TOPICAL EVERY 12 HOURS
Qty: 0 | Refills: 0 | Status: DISCONTINUED | OUTPATIENT
Start: 2019-04-29 | End: 2019-04-30

## 2019-04-29 RX ORDER — ACETAMINOPHEN 500 MG
650 TABLET ORAL EVERY 6 HOURS
Qty: 0 | Refills: 0 | Status: DISCONTINUED | OUTPATIENT
Start: 2019-04-29 | End: 2019-05-02

## 2019-04-29 RX ORDER — METOPROLOL TARTRATE 50 MG
12.5 TABLET ORAL EVERY 12 HOURS
Qty: 0 | Refills: 0 | Status: DISCONTINUED | OUTPATIENT
Start: 2019-04-29 | End: 2019-05-02

## 2019-04-29 RX ORDER — INSULIN LISPRO 100/ML
VIAL (ML) SUBCUTANEOUS
Qty: 0 | Refills: 0 | Status: DISCONTINUED | OUTPATIENT
Start: 2019-04-29 | End: 2019-05-02

## 2019-04-29 RX ADMIN — PANTOPRAZOLE SODIUM 40 MILLIGRAM(S): 20 TABLET, DELAYED RELEASE ORAL at 11:59

## 2019-04-29 RX ADMIN — HEPARIN SODIUM 5000 UNIT(S): 5000 INJECTION INTRAVENOUS; SUBCUTANEOUS at 05:11

## 2019-04-29 RX ADMIN — CHLORHEXIDINE GLUCONATE 1 APPLICATION(S): 213 SOLUTION TOPICAL at 05:12

## 2019-04-29 RX ADMIN — AMIODARONE HYDROCHLORIDE 400 MILLIGRAM(S): 400 TABLET ORAL at 01:59

## 2019-04-29 RX ADMIN — HEPARIN SODIUM 5000 UNIT(S): 5000 INJECTION INTRAVENOUS; SUBCUTANEOUS at 15:10

## 2019-04-29 RX ADMIN — CLOPIDOGREL BISULFATE 75 MILLIGRAM(S): 75 TABLET, FILM COATED ORAL at 11:59

## 2019-04-29 RX ADMIN — CEFTRIAXONE 100 GRAM(S): 500 INJECTION, POWDER, FOR SOLUTION INTRAMUSCULAR; INTRAVENOUS at 05:11

## 2019-04-29 RX ADMIN — FENTANYL CITRATE 6.8 MICROGRAM(S)/KG/HR: 50 INJECTION INTRAVENOUS at 05:30

## 2019-04-29 RX ADMIN — ATORVASTATIN CALCIUM 40 MILLIGRAM(S): 80 TABLET, FILM COATED ORAL at 22:21

## 2019-04-29 RX ADMIN — AMIODARONE HYDROCHLORIDE 400 MILLIGRAM(S): 400 TABLET ORAL at 15:11

## 2019-04-29 RX ADMIN — CHLORHEXIDINE GLUCONATE 15 MILLILITER(S): 213 SOLUTION TOPICAL at 19:54

## 2019-04-29 RX ADMIN — HEPARIN SODIUM 5000 UNIT(S): 5000 INJECTION INTRAVENOUS; SUBCUTANEOUS at 22:21

## 2019-04-29 RX ADMIN — Medication 81 MILLIGRAM(S): at 11:59

## 2019-04-29 RX ADMIN — INSULIN GLARGINE 8 UNIT(S): 100 INJECTION, SOLUTION SUBCUTANEOUS at 22:21

## 2019-04-29 RX ADMIN — Medication 12.5 MILLIGRAM(S): at 22:20

## 2019-04-29 NOTE — PROGRESS NOTE ADULT - SUBJECTIVE AND OBJECTIVE BOX
Pt extubated   and improved     PAST MEDICAL & SURGICAL HISTORY:  CAD (coronary artery disease): s/p MI &amp; stent x1 in 05, x1 in 06 &amp; x1 in 08 in [facility]  Hx of breast cancer  Essential hypertension  Hypercholesterolemia  Diabetes mellitus  H/O lumpectomy: 2001  Status post coronary artery stent placement: x3, 2005, 2006, 2008  H/O: hysterectomy: 2008    MEDICATIONS  (STANDING):  amiodarone    Tablet 400 milliGRAM(s) Oral every 12 hours  aspirin  chewable 81 milliGRAM(s) Oral every 24 hours  atorvastatin 40 milliGRAM(s) Oral at bedtime  cefTRIAXone   IVPB 1 Gram(s) IV Intermittent every 24 hours  chlorhexidine 0.12% Liquid 15 milliLiter(s) Oral Mucosa every 12 hours  chlorhexidine 2% Cloths 1 Application(s) Topical <User Schedule>  clopidogrel Tablet 75 milliGRAM(s) Oral daily  fentaNYL   Infusion. 1 MICROgram(s)/kG/Hr (6.8 mL/Hr) IV Continuous <Continuous>  heparin  Injectable 5000 Unit(s) SubCutaneous every 8 hours  insulin glargine Injectable (LANTUS) 8 Unit(s) SubCutaneous at bedtime  insulin lispro (HumaLOG) corrective regimen sliding scale   SubCutaneous every 6 hours  midazolam Infusion 0.02 mG/kG/Hr (1.36 mL/Hr) IV Continuous <Continuous>  pantoprazole  Injectable 40 milliGRAM(s) IV Push daily    MEDICATIONS  (PRN):  acetaminophen   Tablet .. 650 milliGRAM(s) Oral every 6 hours PRN Temp greater or equal to 38C (100.4F), Mild Pain (1 - 3)    ICU Vital Signs Last 24 Hrs  T(C): 37.1 (29 Apr 2019 16:18), Max: 38 (29 Apr 2019 12:00)  T(F): 98.8 (29 Apr 2019 16:18), Max: 100.4 (29 Apr 2019 12:00)  HR: 76 (29 Apr 2019 16:00) (54 - 86)  BP: 119/67 (29 Apr 2019 16:00) (93/51 - 122/63)  BP(mean): 84 (29 Apr 2019 16:00) (61 - 94)  ABP: --  ABP(mean): --  RR: 18 (29 Apr 2019 16:00) (7 - 28)  SpO2: 99% (29 Apr 2019 16:00) (96% - 100%)        Lungs clearer  CV s1 s2  abd soft  ext stable                          9.9    6.10  )-----------( 135      ( 29 Apr 2019 05:31 )             30.4   04-29    130<L>  |  98  |  15  ----------------------------<  110<H>  4.0   |  24  |  0.56    Ca    8.5      29 Apr 2019 05:31  Phos  2.8     04-29  Mg     2.2     04-29    TPro  5.8<L>  /  Alb  2.8<L>  /  TBili  0.7  /  DBili  x   /  AST  17  /  ALT  31  /  AlkPhos  45  04-28

## 2019-04-29 NOTE — PROGRESS NOTE ADULT - SUBJECTIVE AND OBJECTIVE BOX
OVERNIGHT EVENTS: Overnight the patient received two slow 250 mg cc boluses, with improvement in her fluid output to 30-40 cc/hr. She was otherwise stable, sedated on fentanyl/versed but able to follow commands and answer questions by nodding.     SUBJECTIVE / INTERVAL HPI: Patient seen and examined at bedside. Off sedation this morning, she was nodding to answer questions and following commands. She complained of discomfort with the ET tube but otherwise no pain. Otherwise she was afebrile overnight and hemodynamically stable with BPs 90s-100s/50s.     VITAL SIGNS:  Vital Signs Last 24 Hrs  T(C): 37.2 (29 Apr 2019 05:39), Max: 37.8 (28 Apr 2019 14:00)  T(F): 99 (29 Apr 2019 05:39), Max: 100 (28 Apr 2019 14:00)  HR: 72 (29 Apr 2019 10:00) (50 - 74)  BP: 105/53 (29 Apr 2019 10:00) (93/51 - 128/45)  BP(mean): 73 (29 Apr 2019 10:00) (60 - 95)  RR: 11 (29 Apr 2019 10:00) (4 - 19)  SpO2: 99% (29 Apr 2019 10:00) (95% - 100%)    04-28-19 @ 07:01  -  04-29-19 @ 07:00  --------------------------------------------------------  IN: 855 mL / OUT: 667 mL / NET: 188 mL    04-29-19 @ 07:01 - 04-29-19 @ 10:40  --------------------------------------------------------  IN: 0 mL / OUT: 80 mL / NET: -80 mL    Mode: AC/ CMV (Assist Control/ Continuous Mandatory Ventilation), RR (machine): 12, TV (machine): 450, FiO2: 40, PEEP: 5, ITime: 1, MAP: 8, PIP: 18    PHYSICAL EXAM:  General: WDWN, intubated, no acute respiratory distress  HEENT: NC/AT; anicteric sclera; MMM  Neck: supple, no JVD  Cardiovascular: +S1/S2, RRR, no m/r/g  Respiratory: CTA B/L; no W/R/R  Gastrointestinal: soft, NT/ND  : +jalloh  Extremities: WWP; no edema, clubbing or cyanosis  Vascular: 2+ radial, DP/PT pulses B/L  Neurological: answering questions with nods, following commands, moving all four extremities  Lines: peripheral IV x 2    MEDICATIONS:  MEDICATIONS  (STANDING):  amiodarone    Tablet 400 milliGRAM(s) Oral every 12 hours  aspirin  chewable 81 milliGRAM(s) Oral every 24 hours  atorvastatin 40 milliGRAM(s) Oral at bedtime  cefTRIAXone   IVPB 1 Gram(s) IV Intermittent every 24 hours  chlorhexidine 0.12% Liquid 15 milliLiter(s) Oral Mucosa every 12 hours  chlorhexidine 2% Cloths 1 Application(s) Topical <User Schedule>  clopidogrel Tablet 75 milliGRAM(s) Oral daily  fentaNYL   Infusion. 1 MICROgram(s)/kG/Hr (6.8 mL/Hr) IV Continuous <Continuous>  heparin  Injectable 5000 Unit(s) SubCutaneous every 8 hours  insulin glargine Injectable (LANTUS) 8 Unit(s) SubCutaneous at bedtime  insulin lispro (HumaLOG) corrective regimen sliding scale   SubCutaneous every 6 hours  midazolam Infusion 0.02 mG/kG/Hr (1.36 mL/Hr) IV Continuous <Continuous>  pantoprazole  Injectable 40 milliGRAM(s) IV Push daily    MEDICATIONS  (PRN):  acetaminophen   Tablet .. 650 milliGRAM(s) Oral every 6 hours PRN Temp greater or equal to 38C (100.4F)    ALLERGIES:  Allergies  No Known Allergies    LABS:                        9.9    6.10  )-----------( 135      ( 29 Apr 2019 05:31 )             30.4     04-29    130<L>  |  98  |  15  ----------------------------<  110<H>  4.0   |  24  |  0.56    Ca    8.5      29 Apr 2019 05:31  Phos  2.8     04-29  Mg     2.2     04-29    TPro  5.8<L>  /  Alb  2.8<L>  /  TBili  0.7  /  DBili  x   /  AST  17  /  ALT  31  /  AlkPhos  45  04-28    PT/INR - ( 28 Apr 2019 06:42 )   PT: 13.1 sec;   INR: 1.15     PTT - ( 28 Apr 2019 06:42 )  PTT:28.2 sec    CAPILLARY BLOOD GLUCOSE  POCT Blood Glucose.: 88 mg/dL (29 Apr 2019 07:13)    Culture - Blood (collected 04-27-19 @ 05:38)  Source: .Blood Blood  Preliminary Report (04-29-19 @ 06:00):    No growth at 2 days.    Culture - Blood (collected 04-27-19 @ 05:38)  Source: .Blood Blood  Preliminary Report (04-29-19 @ 06:00):    No growth at 2 days.    RADIOLOGY & ADDITIONAL TESTS:   CXR: mild bilateral edema, +pulm vascular congestion  EKG: inferior Q waves, sinus rhythm.  Tele: frequent couplets

## 2019-04-29 NOTE — PROGRESS NOTE ADULT - ASSESSMENT
78yo F with FHx CAD and PMHx of HLD, HTN, NIDDM-II, right breast CA s/p lumpectomy and chemo (in remission) and CAD s/p PCI x 3 (last 2008 @ [facility]) who presented with exertional SOB x 2 weeks, admitted for unstable angina, with course c/b episode of sustained symptomatic V-Tach requiring cardioversion and intubation, now s/p cardiac catheterization with no significant coronary disease. She remains hemodynamically stable on amiodarone.

## 2019-04-29 NOTE — PROGRESS NOTE ADULT - PROBLEM SELECTOR PLAN 1
Pt with sustained symptomatic V-tach requiring cardioversion and intubation for airway protection, s/p cath with rule-out of ischemic/coronary etiology. Cause remains unclear at this time, possible etiologies include ventricular myocardial scarring vs R-on-T phenomenon. Ischemic etiology unlikely with cath showing: dLM= 20% pLAD= 40% (MLA 5.0 on IVUS), patent stent mLAD= non obstructive CAD Lcx= luminal irregularities RCA=moderately calcific, patent stent, minimal non obstructive CAD.  -EP consulted, will need AICD prior to discharge  -continue amiodarone 400 mg q12 PO  -plan for cardiac MRI to continue workup once stabilized and extubated  -attempt to extubate today

## 2019-04-30 DIAGNOSIS — A41.9 SEPSIS, UNSPECIFIED ORGANISM: ICD-10-CM

## 2019-04-30 DIAGNOSIS — I25.10 ATHEROSCLEROTIC HEART DISEASE OF NATIVE CORONARY ARTERY WITHOUT ANGINA PECTORIS: ICD-10-CM

## 2019-04-30 LAB
ANION GAP SERPL CALC-SCNC: 9 MMOL/L — SIGNIFICANT CHANGE UP (ref 5–17)
BUN SERPL-MCNC: 11 MG/DL — SIGNIFICANT CHANGE UP (ref 7–23)
CALCIUM SERPL-MCNC: 8.9 MG/DL — SIGNIFICANT CHANGE UP (ref 8.4–10.5)
CHLORIDE SERPL-SCNC: 103 MMOL/L — SIGNIFICANT CHANGE UP (ref 96–108)
CO2 SERPL-SCNC: 25 MMOL/L — SIGNIFICANT CHANGE UP (ref 22–31)
CREAT SERPL-MCNC: 0.54 MG/DL — SIGNIFICANT CHANGE UP (ref 0.5–1.3)
GLUCOSE BLDC GLUCOMTR-MCNC: 166 MG/DL — HIGH (ref 70–99)
GLUCOSE BLDC GLUCOMTR-MCNC: 197 MG/DL — HIGH (ref 70–99)
GLUCOSE BLDC GLUCOMTR-MCNC: 203 MG/DL — HIGH (ref 70–99)
GLUCOSE BLDC GLUCOMTR-MCNC: 95 MG/DL — SIGNIFICANT CHANGE UP (ref 70–99)
GLUCOSE SERPL-MCNC: 99 MG/DL — SIGNIFICANT CHANGE UP (ref 70–99)
HCT VFR BLD CALC: 30.9 % — LOW (ref 34.5–45)
HGB BLD-MCNC: 10.1 G/DL — LOW (ref 11.5–15.5)
MAGNESIUM SERPL-MCNC: 1.8 MG/DL — SIGNIFICANT CHANGE UP (ref 1.6–2.6)
MCHC RBC-ENTMCNC: 30.5 PG — SIGNIFICANT CHANGE UP (ref 27–34)
MCHC RBC-ENTMCNC: 32.7 GM/DL — SIGNIFICANT CHANGE UP (ref 32–36)
MCV RBC AUTO: 93.4 FL — SIGNIFICANT CHANGE UP (ref 80–100)
NRBC # BLD: 0 /100 WBCS — SIGNIFICANT CHANGE UP (ref 0–0)
PHOSPHATE SERPL-MCNC: 3.3 MG/DL — SIGNIFICANT CHANGE UP (ref 2.5–4.5)
PLATELET # BLD AUTO: 153 K/UL — SIGNIFICANT CHANGE UP (ref 150–400)
POTASSIUM SERPL-MCNC: 4 MMOL/L — SIGNIFICANT CHANGE UP (ref 3.5–5.3)
POTASSIUM SERPL-SCNC: 4 MMOL/L — SIGNIFICANT CHANGE UP (ref 3.5–5.3)
RBC # BLD: 3.31 M/UL — LOW (ref 3.8–5.2)
RBC # FLD: 14.3 % — SIGNIFICANT CHANGE UP (ref 10.3–14.5)
SODIUM SERPL-SCNC: 137 MMOL/L — SIGNIFICANT CHANGE UP (ref 135–145)
WBC # BLD: 5.2 K/UL — SIGNIFICANT CHANGE UP (ref 3.8–10.5)
WBC # FLD AUTO: 5.2 K/UL — SIGNIFICANT CHANGE UP (ref 3.8–10.5)

## 2019-04-30 PROCEDURE — 75561 CARDIAC MRI FOR MORPH W/DYE: CPT | Mod: 26

## 2019-04-30 PROCEDURE — 99291 CRITICAL CARE FIRST HOUR: CPT

## 2019-04-30 RX ORDER — CEFTRIAXONE 500 MG/1
1 INJECTION, POWDER, FOR SOLUTION INTRAMUSCULAR; INTRAVENOUS EVERY 24 HOURS
Qty: 0 | Refills: 0 | Status: COMPLETED | OUTPATIENT
Start: 2019-05-01 | End: 2019-05-01

## 2019-04-30 RX ORDER — POLYETHYLENE GLYCOL 3350 17 G/17G
17 POWDER, FOR SOLUTION ORAL DAILY
Qty: 0 | Refills: 0 | Status: DISCONTINUED | OUTPATIENT
Start: 2019-04-30 | End: 2019-05-02

## 2019-04-30 RX ORDER — SACUBITRIL AND VALSARTAN 24; 26 MG/1; MG/1
1 TABLET, FILM COATED ORAL
Qty: 0 | Refills: 0 | Status: DISCONTINUED | OUTPATIENT
Start: 2019-04-30 | End: 2019-05-02

## 2019-04-30 RX ORDER — MAGNESIUM SULFATE 500 MG/ML
2 VIAL (ML) INJECTION ONCE
Qty: 0 | Refills: 0 | Status: COMPLETED | OUTPATIENT
Start: 2019-04-30 | End: 2019-04-30

## 2019-04-30 RX ADMIN — SACUBITRIL AND VALSARTAN 1 TABLET(S): 24; 26 TABLET, FILM COATED ORAL at 17:59

## 2019-04-30 RX ADMIN — SACUBITRIL AND VALSARTAN 1 TABLET(S): 24; 26 TABLET, FILM COATED ORAL at 10:43

## 2019-04-30 RX ADMIN — Medication 12.5 MILLIGRAM(S): at 05:14

## 2019-04-30 RX ADMIN — Medication 2: at 16:37

## 2019-04-30 RX ADMIN — HEPARIN SODIUM 5000 UNIT(S): 5000 INJECTION INTRAVENOUS; SUBCUTANEOUS at 14:23

## 2019-04-30 RX ADMIN — INSULIN GLARGINE 8 UNIT(S): 100 INJECTION, SOLUTION SUBCUTANEOUS at 23:37

## 2019-04-30 RX ADMIN — Medication 4: at 23:37

## 2019-04-30 RX ADMIN — POLYETHYLENE GLYCOL 3350 17 GRAM(S): 17 POWDER, FOR SOLUTION ORAL at 11:38

## 2019-04-30 RX ADMIN — CEFTRIAXONE 100 GRAM(S): 500 INJECTION, POWDER, FOR SOLUTION INTRAMUSCULAR; INTRAVENOUS at 05:15

## 2019-04-30 RX ADMIN — HEPARIN SODIUM 5000 UNIT(S): 5000 INJECTION INTRAVENOUS; SUBCUTANEOUS at 05:15

## 2019-04-30 RX ADMIN — AMIODARONE HYDROCHLORIDE 400 MILLIGRAM(S): 400 TABLET ORAL at 05:15

## 2019-04-30 RX ADMIN — Medication 12.5 MILLIGRAM(S): at 17:59

## 2019-04-30 RX ADMIN — CHLORHEXIDINE GLUCONATE 1 APPLICATION(S): 213 SOLUTION TOPICAL at 05:51

## 2019-04-30 RX ADMIN — Medication 50 GRAM(S): at 06:57

## 2019-04-30 RX ADMIN — CHLORHEXIDINE GLUCONATE 15 MILLILITER(S): 213 SOLUTION TOPICAL at 05:14

## 2019-04-30 RX ADMIN — HEPARIN SODIUM 5000 UNIT(S): 5000 INJECTION INTRAVENOUS; SUBCUTANEOUS at 23:39

## 2019-04-30 RX ADMIN — Medication 81 MILLIGRAM(S): at 11:11

## 2019-04-30 RX ADMIN — Medication 2: at 11:10

## 2019-04-30 RX ADMIN — ATORVASTATIN CALCIUM 40 MILLIGRAM(S): 80 TABLET, FILM COATED ORAL at 23:38

## 2019-04-30 RX ADMIN — CLOPIDOGREL BISULFATE 75 MILLIGRAM(S): 75 TABLET, FILM COATED ORAL at 11:38

## 2019-04-30 RX ADMIN — Medication 300 MILLIGRAM(S): at 11:39

## 2019-04-30 NOTE — PROGRESS NOTE ADULT - SUBJECTIVE AND OBJECTIVE BOX
Pt  is improved  s/p Cardiac Cath  Non obstructive disease     Ventricular  Arrythmia Hypotension   resolved    PAST MEDICAL & SURGICAL HISTORY:  CAD (coronary artery disease): s/p MI &amp; stent x1 in 05, x1 in 06 &amp; x1 in 08 in [facility]  Hx of breast cancer  Essential hypertension  Hypercholesterolemia  Diabetes mellitus  H/O lumpectomy: 2001  Status post coronary artery stent placement: x3, 2005, 2006, 2008  H/O: hysterectomy: 2008    MEDICATIONS  (STANDING):  amiodarone    Tablet 400 milliGRAM(s) Oral every 12 hours  aspirin  chewable 81 milliGRAM(s) Oral every 24 hours  atorvastatin 40 milliGRAM(s) Oral at bedtime  cefTRIAXone   IVPB 1 Gram(s) IV Intermittent every 24 hours  chlorhexidine 2% Cloths 1 Application(s) Topical <User Schedule>  clopidogrel Tablet 75 milliGRAM(s) Oral daily  heparin  Injectable 5000 Unit(s) SubCutaneous every 8 hours  insulin glargine Injectable (LANTUS) 8 Unit(s) SubCutaneous at bedtime  insulin lispro (HumaLOG) corrective regimen sliding scale   SubCutaneous Before meals and at bedtime  metoprolol tartrate 12.5 milliGRAM(s) Oral every 12 hours    MEDICATIONS  (PRN):  acetaminophen   Tablet .. 650 milliGRAM(s) Oral every 6 hours PRN Temp greater or equal to 38C (100.4F), Mild Pain (1 - 3)  ICU Vital Signs Last 24 Hrs  T(C): 36.8 (30 Apr 2019 09:08), Max: 38 (29 Apr 2019 12:00)  T(F): 98.2 (30 Apr 2019 09:08), Max: 100.4 (29 Apr 2019 12:00)  HR: 62 (30 Apr 2019 09:00) (60 - 86)  BP: 122/70 (30 Apr 2019 09:00) (102/55 - 136/68)  BP(mean): 96 (30 Apr 2019 09:00) (73 - 97)  ABP: --  ABP(mean): --  RR: 22 (30 Apr 2019 09:00) (11 - 28)  SpO2: 95% (30 Apr 2019 09:00) (94% - 99%)      Lungs decreased breath sounds at bases     Cv s1 s2  abd soft  ext stable                          10.1   5.20  )-----------( 153      ( 30 Apr 2019 05:42 )             30.9   04-30    137  |  103  |  11  ----------------------------<  99  4.0   |  25  |  0.54    Ca    8.9      30 Apr 2019 05:42  Phos  3.3     04-30  Mg     1.8     04-30    TPro  5.8<L>  /  Alb  2.8<L>  /  TBili  0.7  /  DBili  x   /  AST  17  /  ALT  31  /  AlkPhos  45  04-28    CXR  small L effusion /infiltrate   r effusion     Echo    EF    35 %   basal inf wall, inf septal   inf lateral akinesis

## 2019-04-30 NOTE — PROGRESS NOTE ADULT - PROBLEM SELECTOR PLAN 8
1) PCP Contacted on Admission: (Y/N) --> Name & Phone #: PCP Dr. Cade Mascorro [phone]. Cardiologist Dr. David Pulliam [phone]  2) Date of Contact with PCP: updated PCP and Dr. Pulliam on 4/30/2019  3) PCP Contacted at Discharge: (Y/N)  4) Summary of Handoff Given to PCP:   5) Post-Discharge Appointment Date and Location:

## 2019-04-30 NOTE — PROGRESS NOTE ADULT - ATTENDING COMMENTS
For ORIF
Assessment: Patient personally seen and examined myself during rounds with the House Staff/Fellow  ON DATE 4/28/19    House Staff/Fellow note read, including vitals, physical findings, laboratory data, and radiological reports.   Revisions included below.   Direct personal management at bed side and extensive interpretation of the data.    Plan was outlined and discussed in details with the House Staff/Fellow.    Decision making of high complexity   Risk high of complications, morbidity, and/or mortality    Assessment and Action taken for acute disease activity to reflect the level of care provided:  -Hemodynamic evaluation and support  -ACS assessment and treatment as applicable  -Heart failure assessment and treatment as applicable  -Cardiac Telemetry reviewed  -Medication reconciliation  -Review laboratory data  -EKG reviewed   -Echo reviewed  -Interdisciplinary discussion with IC / EP / HF / CTS teams as needed    My plan includes :  close hemodynamic monitoring and management   Monitor for arrhythmias and monitor parameters for organ perfusion  monitor neurologic status  Head of the bed should remain elevated to 45 deg .   chest PT and IS will be encouraged  monitor adequacy of oxygenation and ventilation and attempt to wean oxygen  Nutritional goals will be met using po eventually , ensure adequate caloric intake and montior the same  Stress ulcer and VTE prophylaxis will be achieved    Glycemic control is satisfactory  Electrolytes have been replete as necessary and wound care has been carried out. Pain control has been achieved.   aggressive physical therapy and early mobility and ambulation goals will be met   The family was updated about the course and plan    CRITICAL CARE TIME SPENT in evaluation and management, reassessments, review and interpretation of labs and x-rays, ventilator and hemodynamic management, formulating a plan and coordinating care: ___90____ MIN.  Time does not include procedural time.    Azul Mcdowell MD  CCU ATTENDING
Assessment: Patient personally seen and examined myself during rounds with the House Staff/Fellow  ON DATE 4/27/19    House Staff/Fellow note read, including vitals, physical findings, laboratory data, and radiological reports.   Revisions included below.   Direct personal management at bed side and extensive interpretation of the data.    Plan was outlined and discussed in details with the House Staff/Fellow.    Decision making of high complexity   Risk high of complications, morbidity, and/or mortality    Assessment and Action taken for acute disease activity to reflect the level of care provided:  -Hemodynamic evaluation and support  -ACS assessment and treatment as applicable  -Heart failure assessment and treatment as applicable  -Cardiac Telemetry reviewed  -Medication reconciliation  -Review laboratory data  -EKG reviewed   -Echo reviewed  -Interdisciplinary discussion with IC / EP / HF / CTS teams as needed    My plan includes :  close hemodynamic monitoring and management   Monitor for arrhythmias and monitor parameters for organ perfusion  monitor neurologic status  Head of the bed should remain elevated to 45 deg .   chest PT and IS will be encouraged  monitor adequacy of oxygenation and ventilation and attempt to wean oxygen  Nutritional goals will be met using po eventually , ensure adequate caloric intake and montior the same  Stress ulcer and VTE prophylaxis will be achieved    Glycemic control is satisfactory  Electrolytes have been replete as necessary and wound care has been carried out. Pain control has been achieved.   aggressive physical therapy and early mobility and ambulation goals will be met   The family was updated about the course and plan    CRITICAL CARE TIME SPENT in evaluation and management, reassessments, review and interpretation of labs and x-rays, ventilator and hemodynamic management, formulating a plan and coordinating care: ___90____ MIN.  Time does not include procedural time.    Azul Mcdowell MD  CCU ATTENDING
Overnight the patient received two slow 250 mg cc boluses, with improvement in her fluid output to 30-40 cc/hr. She was otherwise stable, sedated on fentanyl/versed but able to follow commands and answer questions by nodding.  78yo F with FHx CAD and PMHx of HLD, HTN, NIDDM-II, right breast CA s/p lumpectomy and chemo (in remission) and CAD s/p PCI x 3 (last 2008 @ [facility]) who presented with exertional SOB x 2 weeks, admitted for unstable angina, with course c/b episode of sustained symptomatic V-Tach requiring cardioversion and intubation, now s/p cardiac catheterization with no significant coronary disease. She remains hemodynamically stable on amiodarone.  Pt with sustained symptomatic V-tach requiring cardioversion and intubation for airway protection, s/p cath with rule-out of ischemic/coronary etiology. Cause remains unclear at this time, possible etiologies include ventricular myocardial scarring vs R-on-T phenomenon. Ischemic etiology unlikely with cath showing: dLM= 20% pLAD= 40% (MLA 5.0 on IVUS), patent stent mLAD= non obstructive CAD Lcx= luminal irregularities RCA=moderately calcific, patent stent, minimal non obstructive CAD.  -EP consulted, will need AICD prior to discharge  -continue amiodarone 400 mg q12 PO  -plan for cardiac MRI to continue workup once stabilized and extubated  -attempt to extubate today    EF 35% on Echo, basal/inferior wall akinesis/hypokinesis.   - will try and add on low-dose entresto    I have personally provided 30 minutes of critical care time concurrently with the resident and  fellow.  I have reviewed the resident’s documentation and I agree with the resident’s assessment and plan of care.    SHARON Amador
78yo F with FHx CAD and PMHx of HLD, HTN, NIDDM-II, R breast CA s/p lumpectomy and chemo (in remission), and CAD s/p PCI x 3 (last 2008 @ [facility]) who presented with exertional SOB and chest pressure x 2 weeks. She was initially admitted to [facility] for management of unstable angina; however at ~11:09PM on 4/26 the patient was noted to have symptomatic sustained VT when walking to bathroom. She was shocked with sinus rhythm restored, however began to develop agonal breathing requiring intubation and transfer to the CCU. She was taken emergently to the cath lab, where she had IABP placed and and the following findings: dLM= 20% pLAD= 40% (MLA 5.0 on IVUS), patent stent mLAD= non obstructive CAD Lcx= luminal irregularities RCA=moderately calcific, patent stent, minimal non obstructive CAD LVEDP= 14 mmHg LVEF= 35%. Echocardiogram showed EF 35% with inferior wall akinesis. In the CCU, she was loaded with amiodarone IV with eventual transition to PO and addition of metoprolol and Entresto for her HFrEF. She was successfully extubated on 4/29. She is stable for stepdown to cardiology tele with plan for cardiac MR followed by ICD placement prior to discharge.     OVERNIGHT EVENTS: No acute events overnight. Her urine output improved after she began PO intake. She also was started on 12.5 mg bid metoprolol.     symptomatic V-Tach requiring cardioversion and intubation, s/p cardiac catheterization with no significant coronary disease. Likley attributable ot her reduced ejection fraction (systolic heart failure).  She is now extubated and hemodynamically stable on amiodarone for VT and with plan for ICD prior to discharge.       EF 35% on Echo, with basal/inferior wall akinesis/hypokinesis.   - continue metoprolol 12.5 bid  - adding on low-dose entresto    I have personally provided 30 minutes of critical care time concurrently with the resident and  fellow.  I have reviewed the resident’s documentation and I agree with the resident’s assessment and plan of care.    SHARON Amador

## 2019-04-30 NOTE — PHYSICAL THERAPY INITIAL EVALUATION ADULT - ADDITIONAL COMMENTS
Patient lives in house, 3 steps to enter, 7 steps to second floor. Patient denies home health assistance or history of falls.

## 2019-04-30 NOTE — PROGRESS NOTE ADULT - PROBLEM SELECTOR PLAN 2
EF 35% on Echo, with basal/inferior wall akinesis/hypokinesis.   - continue metoprolol 12.5 bid  - adding on low-dose entresto

## 2019-04-30 NOTE — PROGRESS NOTE ADULT - PROBLEM SELECTOR PLAN 1
Pt with sustained symptomatic V-tach requiring cardioversion and intubation for airway protection, s/p cath with rule-out of ischemic/coronary etiology. Cause remains unclear at this time, possible etiologies include ventricular myocardial scarring vs R-on-T phenomenon. Cath showed: dLM= 20% pLAD= 40% (MLA 5.0 on IVUS), patent stent mLAD= non obstructive CAD Lcx= luminal irregularities RCA=moderately calcific, patent stent, minimal non obstructive CAD.  -EP consulted, will need AICD prior to discharge  -cardiac MRI prior to ICD per EP (Dr. Sesay)  -stopping amiodarone 400 mg q12 PO per EP due to prolonged QT  -continue metoprolol 12.5 bid, transition to Toprol when stable or on discharge

## 2019-04-30 NOTE — PROGRESS NOTE ADULT - SUBJECTIVE AND OBJECTIVE BOX
EPS Progress Note  CC: ROGERS    S: Feels good today. No events over the past 2 days in CCU.   Awaiting for cardiac MRI     O: T(C): 36.8 (19 @ 09:08), Max: 37.1 (19 @ 15:00)  HR: 62 (19 @ 11:00) (60 - 86)  BP: 124/70 (19 @ 11:00) (102/55 - 136/68)  RR: 19 (19 @ 11:00) (14 - 28)  SpO2: 98% (19 @ 11:00) (94% - 99%)    TELE: NSR HR 60s.  PVC couplets. Long QTc     EK19: NSR 68.  RBBB ( ms).  Prolonged QTc 533 ms.     PHYSICAL  Constitutional:  NAD        Neck: No JVD  Pulm:  CTA B/L, no wheeze or rale   Cardiac:   + s1/s2, RRR, no murmur   GI:  +BS , soft ND/NT  Vascular: No LE edema, pulse 2+  Neuro: AAO x 3. no focal deficit  Skin: Warm. No rash or lesion     LABS:                        10.1   5.20  )-----------( 153      ( 2019 05:42 )             30.9         137  |  103  |  11  ----------------------------<  99  4.0   |  25  |  0.54    Ca    8.9      2019 05:42  Phos  3.3       Mg     1.8         TPro  5.8<L>  /  Alb  2.8<L>  /  TBili  0.7  /  DBili  x   /  AST  17  /  ALT  31  /  AlkPhos  45          MEDICATIONS:  acetaminophen   Tablet .. 650 milliGRAM(s) Oral every 6 hours PRN  aspirin  chewable 81 milliGRAM(s) Oral every 24 hours  atorvastatin 40 milliGRAM(s) Oral at bedtime  chlorhexidine 2% Cloths 1 Application(s) Topical <User Schedule>  clopidogrel Tablet 75 milliGRAM(s) Oral daily  heparin  Injectable 5000 Unit(s) SubCutaneous every 8 hours  insulin glargine Injectable (LANTUS) 8 Unit(s) SubCutaneous at bedtime  insulin lispro (HumaLOG) corrective regimen sliding scale   SubCutaneous Before meals and at bedtime  metoprolol tartrate 12.5 milliGRAM(s) Oral every 12 hours  polyethylene glycol 3350 17 Gram(s) Oral daily  sacubitril 24 mG/valsartan 26 mG 1 Tablet(s) Oral two times a day

## 2019-04-30 NOTE — PHYSICAL THERAPY INITIAL EVALUATION ADULT - MODALITIES TREATMENT COMMENTS
FIM: 4 | Patient initially ambulated 4L of O2, progressed patient to 150 ft with on room air. Patient's sp02 remained at 94-95% on room air during ambulation.

## 2019-04-30 NOTE — PHYSICAL THERAPY INITIAL EVALUATION ADULT - GAIT DEVIATIONS NOTED, PT EVAL
lateral trunk sway, decreased gait speed. Lateral trunk sway worsens with turns./decreased step length/decreased stride length

## 2019-04-30 NOTE — PROGRESS NOTE ADULT - SUBJECTIVE AND OBJECTIVE BOX
PGY1 ACCEPTANCE NOTE CCU to [address] [place] [address] Course:  78yo F with FHx CAD and PMHx of HLD, HTN, NIDDM-II, R breast CA s/p lumpectomy and chemo (in remission), and CAD s/p PCI x 3 (last 2008 @ [facility]) who presented with exertional SOB and chest pressure x 2 weeks. She was initially admitted to [facility] for management of unstable angina; however at ~11:09PM on 4/26 the patient was noted to have symptomatic sustained VT when walking to bathroom. She was shocked with sinus rhythm restored, however began to develop agonal breathing requiring intubation and transfer to the CCU. She was taken emergently to the cath lab, where she had IABP placed and and the following findings: dLM= 20% pLAD= 40% (MLA 5.0 on IVUS), patent stent mLAD= non obstructive CAD Lcx= luminal irregularities RCA=moderately calcific, patent stent, minimal non obstructive CAD LVEDP= 14 mmHg LVEF= 35%. Echocardiogram showed EF 35% with inferior wall akinesis. In the CCU, she was loaded with amiodarone IV with eventual transition to PO and addition of metoprolol and Entresto for her HFrEF. She was successfully extubated on 4/29. She is stable for stepdown to cardiology tele with plan for cardiac MR followed by ICD placement prior to discharge.       SUBJECTIVE / INTERVAL HPI: Patient seen and examined at bedside. Pt laying in bed comfortably on NC. Has no complaints at this time. Has not had BM in 2-3 days. Denies any CP, SOB, Palpitations, N/V, Diarrhea, F, or chills at this time.     VITAL SIGNS:  Vital Signs Last 24 Hrs  T(C): 36.9 (30 Apr 2019 12:00), Max: 37.1 (29 Apr 2019 15:00)  T(F): 98.5 (30 Apr 2019 12:00), Max: 98.8 (29 Apr 2019 16:18)  HR: 72 (30 Apr 2019 14:00) (60 - 80)  BP: 127/70 (30 Apr 2019 14:00) (102/55 - 136/68)  BP(mean): 89 (30 Apr 2019 14:00) (75 - 97)  RR: 29 (30 Apr 2019 14:00) (14 - 33)  SpO2: 94% (30 Apr 2019 14:00) (92% - 99%)    PHYSICAL EXAM:    General: WDWN, elderly female, laying in bed on NC   HEENT: NC/AT; PERRL, anicteric sclera; MMM  Neck: supple  Cardiovascular: +S1/S2; RRR  Respiratory: mild crackles at the bases R > L ; no W/R/R  Gastrointestinal: mildly distended with generalized tenderness to palpation in all quadrants +BSx4  Extremities: WWP; no edema, clubbing or cyanosis  Vascular: 2+ radial, DP/PT pulses B/L  Neurological: AAOx3; no focal deficits    MEDICATIONS:  MEDICATIONS  (STANDING):  aspirin  chewable 81 milliGRAM(s) Oral every 24 hours  atorvastatin 40 milliGRAM(s) Oral at bedtime  chlorhexidine 2% Cloths 1 Application(s) Topical <User Schedule>  clopidogrel Tablet 75 milliGRAM(s) Oral daily  heparin  Injectable 5000 Unit(s) SubCutaneous every 8 hours  insulin glargine Injectable (LANTUS) 8 Unit(s) SubCutaneous at bedtime  insulin lispro (HumaLOG) corrective regimen sliding scale   SubCutaneous Before meals and at bedtime  metoprolol tartrate 12.5 milliGRAM(s) Oral every 12 hours  polyethylene glycol 3350 17 Gram(s) Oral daily  sacubitril 24 mG/valsartan 26 mG 1 Tablet(s) Oral two times a day    MEDICATIONS  (PRN):  acetaminophen   Tablet .. 650 milliGRAM(s) Oral every 6 hours PRN Temp greater or equal to 38C (100.4F), Mild Pain (1 - 3)      ALLERGIES:  Allergies    No Known Allergies    Intolerances        LABS:                        10.1   5.20  )-----------( 153      ( 30 Apr 2019 05:42 )             30.9     04-30    137  |  103  |  11  ----------------------------<  99  4.0   |  25  |  0.54    Ca    8.9      30 Apr 2019 05:42  Phos  3.3     04-30  Mg     1.8     04-30    TPro  5.8<L>  /  Alb  2.8<L>  /  TBili  0.7  /  DBili  x   /  AST  17  /  ALT  31  /  AlkPhos  45  04-28      Urinalysis Basic - ( 29 Apr 2019 21:06 )    Color: x / Appearance: x / SG: x / pH: x  Gluc: x / Ketone: x  / Bili: x / Urobili: x   Blood: x / Protein: x / Nitrite: x   Leuk Esterase: x / RBC: Many /HPF / WBC 5-10 /HPF   Sq Epi: x / Non Sq Epi: 0-5 /HPF / Bacteria: Present /HPF      CAPILLARY BLOOD GLUCOSE      POCT Blood Glucose.: 197 mg/dL (30 Apr 2019 11:03)      RADIOLOGY & ADDITIONAL TESTS: Reviewed.

## 2019-04-30 NOTE — PROGRESS NOTE ADULT - ASSESSMENT
80 y/o F with a history of HLD, HTN, NIDDM-II, right breast CA s/p lumpectomy and chemo (in remission) and CAD s/p PCI x 3 (last 2008 @ [facility]) who presented with exertional SOB x 2 weeks.  Found to have worsening EF (EF 35%) and suffering a VT arrest on 4/26 while in the hospital (treated with 1 shock).  Cardiac cath revealed patent stents and no new obstructive CAD.  QTc noted prolonged QTc 486 ms on admission with frequent PVC.  She was put on Amiodarone post arrest.  QTc today is longer, up to 533 ms.    - Recommend stopping Amiodarone to avoid prolong QTc and having PVC falling into the vulnerable refractory period. Continue medical therapy for NICM.    - Cardiac MRI hopefully tonight.   - If CMRI is completed, we can proceed with ICD implant for secondary prevention. Risks and benefits of procedure were explained to her. Consent signed.   - Case d/w Dr. Sesay and CCU team.

## 2019-04-30 NOTE — PHYSICAL THERAPY INITIAL EVALUATION ADULT - GENERAL OBSERVATIONS, REHAB EVAL
Patient received seated in chair, No apparent distress, +HEP lock, +tele, +4L of O2 ( nasal cannula).

## 2019-04-30 NOTE — PROGRESS NOTE ADULT - PROBLEM SELECTOR PLAN 8
1) PCP Contacted on Admission: (Y/N) --> Name & Phone #:  2) Date of Contact with PCP:  3) PCP Contacted at Discharge: (Y/N)  4) Summary of Handoff Given to PCP:   5) Post-Discharge Appointment Date and Location: 1) PCP Contacted on Admission: (Y/N) --> Name & Phone #: PCP Dr. Cade Mascorro [phone]. Cardiologist Dr. David Pulliam [phone]  2) Date of Contact with PCP: updated PCP 4/30/2019, attempted to call Dr. Pulliam 4/30/2019  3) PCP Contacted at Discharge: (Y/N)  4) Summary of Handoff Given to PCP:   5) Post-Discharge Appointment Date and Location: 1) PCP Contacted on Admission: (Y/N) --> Name & Phone #: PCP Dr. Cade Mascorro [phone]. Cardiologist Dr. David Pulliam [phone]  2) Date of Contact with PCP: updated PCP and Dr. Pulliam on 4/30/2019  3) PCP Contacted at Discharge: (Y/N)  4) Summary of Handoff Given to PCP:   5) Post-Discharge Appointment Date and Location:

## 2019-04-30 NOTE — PROGRESS NOTE ADULT - SUBJECTIVE AND OBJECTIVE BOX
HOSPITAL COURSE: 78yo F with FHx CAD and PMHx of HLD, HTN, NIDDM-II, R breast CA s/p lumpectomy and chemo (in remission), and CAD s/p PCI x 3 (last 2008 @ [facility]) who presented with exertional SOB and chest pressure x 2 weeks. She was initially admitted to [facility] for management of unstable angina; however at ~11:09PM on 4/26 the patient was noted to have symptomatic sustained VT when walking to bathroom. She was shocked with sinus rhythm restored, however began to develop agonal breathing requiring intubation and transfer to the CCU. She was taken emergently to the cath lab, where she had IABP placed and and the following findings: dLM= 20% pLAD= 40% (MLA 5.0 on IVUS), patent stent mLAD= non obstructive CAD Lcx= luminal irregularities RCA=moderately calcific, patent stent, minimal non obstructive CAD LVEDP= 14 mmHg LVEF= 35%. Echocardiogram showed EF 35% with inferior wall akinesis. In the CCU, she was loaded with amiodarone IV with eventual transition to PO and addition of metoprolol and Entresto for her HFrEF. She was successfully extubated on 4/29. She is stable for stepdown to cardiology tele with plan for cardiac MR followed by ICD placement prior to discharge.     OVERNIGHT EVENTS: No acute events overnight. Her urine output improved after she began PO intake. She also was started on 12.5 mg bid metoprolol.     SUBJECTIVE / INTERVAL HPI: Patient seen and examined at bedside. This morning, now extubated, she complains of mild dry cough but denies CP, SOB, palpitations. She also complained of mild nausea with no episodes of vomiting.     VITAL SIGNS:  Vital Signs Last 24 Hrs  T(C): 36.8 (30 Apr 2019 09:08), Max: 38 (29 Apr 2019 12:00)  T(F): 98.2 (30 Apr 2019 09:08), Max: 100.4 (29 Apr 2019 12:00)  HR: 66 (30 Apr 2019 10:00) (60 - 86)  BP: 134/65 (30 Apr 2019 10:00) (102/55 - 136/68)  BP(mean): 88 (30 Apr 2019 10:00) (75 - 97)  RR: 22 (30 Apr 2019 10:00) (13 - 28)  SpO2: 96% (30 Apr 2019 10:00) (94% - 99%)    04-29-19 @ 07:01  -  04-30-19 @ 07:00  --------------------------------------------------------  IN: 250 mL / OUT: 1920 mL / NET: -1670 mL    04-30-19 @ 07:01  - 04-30-19 @ 10:34  --------------------------------------------------------  IN: 0 mL / OUT: 92 mL / NET: -92 mL    PHYSICAL EXAM:  General: WDWN, no acute respiratory distress  HEENT: NC/AT; PERRL, anicteric sclera; MMM  Neck: supple, no JVD  Cardiovascular: +S1/S2, RRR, no m/r/g  Respiratory: mild bibasilar crackles  Gastrointestinal: +mild tenderness to palpation in LUQ  Extremities: WWP; no edema, clubbing or cyanosis  Vascular: 2+ radial, DP/PT pulses B/L  Neurological: AAOx3; no focal deficits    MEDICATIONS:  MEDICATIONS  (STANDING):  amiodarone    Tablet 400 milliGRAM(s) Oral every 12 hours  aspirin  chewable 81 milliGRAM(s) Oral every 24 hours  atorvastatin 40 milliGRAM(s) Oral at bedtime  chlorhexidine 2% Cloths 1 Application(s) Topical <User Schedule>  clopidogrel Tablet 75 milliGRAM(s) Oral daily  heparin  Injectable 5000 Unit(s) SubCutaneous every 8 hours  insulin glargine Injectable (LANTUS) 8 Unit(s) SubCutaneous at bedtime  insulin lispro (HumaLOG) corrective regimen sliding scale   SubCutaneous Before meals and at bedtime  metoprolol tartrate 12.5 milliGRAM(s) Oral every 12 hours  polyethylene glycol 3350 17 Gram(s) Oral daily  sacubitril 24 mG/valsartan 26 mG 1 Tablet(s) Oral two times a day  trimethobenzamide 300 milliGRAM(s) Oral once    MEDICATIONS  (PRN):  acetaminophen   Tablet .. 650 milliGRAM(s) Oral every 6 hours PRN Temp greater or equal to 38C (100.4F), Mild Pain (1 - 3)    ALLERGIES:  Allergies  No Known Allergies    LABS:                        10.1   5.20  )-----------( 153      ( 30 Apr 2019 05:42 )             30.9     04-30    137  |  103  |  11  ----------------------------<  99  4.0   |  25  |  0.54    Ca    8.9      30 Apr 2019 05:42  Phos  3.3     04-30  Mg     1.8     04-30    TPro  5.8<L>  /  Alb  2.8<L>  /  TBili  0.7  /  DBili  x   /  AST  17  /  ALT  31  /  AlkPhos  45  04-28    Urinalysis Basic - ( 29 Apr 2019 21:06 )    Color: x / Appearance: x / SG: x / pH: x  Gluc: x / Ketone: x  / Bili: x / Urobili: x   Blood: x / Protein: x / Nitrite: x   Leuk Esterase: x / RBC: Many /HPF / WBC 5-10 /HPF   Sq Epi: x / Non Sq Epi: 0-5 /HPF / Bacteria: Present /HPF    CAPILLARY BLOOD GLUCOSE  POCT Blood Glucose.: 95 mg/dL (30 Apr 2019 05:48)    RADIOLOGY & ADDITIONAL TESTS:  Tele: frequent couplets

## 2019-04-30 NOTE — PROGRESS NOTE ADULT - ASSESSMENT
80yo F with FHx CAD and PMHx of HLD, HTN, NIDDM-II, right breast CA s/p lumpectomy and chemo (in remission), and CAD s/p PCI x 3 (last 2008 @ [facility]) who presented with exertional SOB x 2 weeks, admitted for unstable angina, with course c/b episode of sustained symptomatic V-Tach requiring cardioversion and intubation, s/p cardiac catheterization with no significant coronary disease. She is now extubated and hemodynamically stable on amiodarone for VT and with plan for ICD prior to discharge.

## 2019-04-30 NOTE — PROGRESS NOTE ADULT - ASSESSMENT
78yo F with FHx CAD and PMHx of HLD, HTN, NIDDM-II, right breast CA s/p lumpectomy and chemo (in remission), and CAD s/p PCI x 3 (last 2008 @ [facility]) who presented with exertional SOB x 2 weeks, admitted for unstable angina, with course c/b episode of sustained symptomatic V-Tach requiring cardioversion and intubation, s/p cardiac catheterization with no significant coronary disease. She is now extubated and hemodynamically stable on amiodarone for VT and with plan for ICD prior to discharge.

## 2019-04-30 NOTE — PHYSICAL THERAPY INITIAL EVALUATION ADULT - PERTINENT HX OF CURRENT PROBLEM, REHAB EVAL
78yo F with FHx CAD and PMHx of HLD, HTN, NIDDM-II, right breast CA s/p lumpectomy and chemo (in remission) and CAD s/p PCI x 3 (last 2008 @ [facility]) who presented with exertional SOB x 2 weeks, admitted for unstable angina, with course c/b episode of sustained symptomatic V-Tach requiring cardioversion and intubation, now s/p cardiac catheterization

## 2019-04-30 NOTE — PHYSICAL THERAPY INITIAL EVALUATION ADULT - ASSISTIVE DEVICE FOR TRANSFER: GAIT, REHAB EVAL
CGA for 150 ft with BUE on portable monitor, progressed to 150 ft with unilateral UE on portable monitor

## 2019-04-30 NOTE — PROGRESS NOTE ADULT - PROBLEM SELECTOR PLAN 1
Pt with sustained symptomatic V-tach requiring cardioversion and intubation for airway protection, s/p cath with rule-out of ischemic/coronary etiology. Cause remains unclear at this time, possible etiologies include ventricular myocardial scarring vs R-on-T phenomenon. Cath showed: dLM= 20% pLAD= 40% (MLA 5.0 on IVUS), patent stent mLAD= non obstructive CAD Lcx= luminal irregularities RCA=moderately calcific, patent stent, minimal non obstructive CAD.  -EP consulted, will need AICD prior to discharge  -cardiac MRI prior to ICD per EP  -continue amiodarone 400 mg q12 PO (until 5/9 when will complete 10g)  -continue metoprolol 12.5 bid, transition to Toprol when stable or on discharge Pt with sustained symptomatic V-tach requiring cardioversion and intubation for airway protection, s/p cath with rule-out of ischemic/coronary etiology. Cause remains unclear at this time, possible etiologies include ventricular myocardial scarring vs R-on-T phenomenon. Cath showed: dLM= 20% pLAD= 40% (MLA 5.0 on IVUS), patent stent mLAD= non obstructive CAD Lcx= luminal irregularities RCA=moderately calcific, patent stent, minimal non obstructive CAD.  -EP consulted, will need AICD prior to discharge  -cardiac MRI prior to ICD per EP: will confirm today with EP if necessary  -continue amiodarone 400 mg q12 PO (until 5/9 when will complete 10g)  -continue metoprolol 12.5 bid, transition to Toprol when stable or on discharge Pt with sustained symptomatic V-tach requiring cardioversion and intubation for airway protection, s/p cath with rule-out of ischemic/coronary etiology. Cause remains unclear at this time, possible etiologies include ventricular myocardial scarring vs R-on-T phenomenon. Cath showed: dLM= 20% pLAD= 40% (MLA 5.0 on IVUS), patent stent mLAD= non obstructive CAD Lcx= luminal irregularities RCA=moderately calcific, patent stent, minimal non obstructive CAD.  -EP consulted, will need AICD prior to discharge  -cardiac MRI prior to ICD per EP (Dr. Sesay)  -stopping amiodarone 400 mg q12 PO per EP due to prolonged QT  -continue metoprolol 12.5 bid, transition to Toprol when stable or on discharge

## 2019-05-01 LAB
ANION GAP SERPL CALC-SCNC: 11 MMOL/L — SIGNIFICANT CHANGE UP (ref 5–17)
APTT BLD: 31.9 SEC — SIGNIFICANT CHANGE UP (ref 27.5–36.3)
BUN SERPL-MCNC: 8 MG/DL — SIGNIFICANT CHANGE UP (ref 7–23)
CALCIUM SERPL-MCNC: 9.2 MG/DL — SIGNIFICANT CHANGE UP (ref 8.4–10.5)
CHLORIDE SERPL-SCNC: 101 MMOL/L — SIGNIFICANT CHANGE UP (ref 96–108)
CO2 SERPL-SCNC: 26 MMOL/L — SIGNIFICANT CHANGE UP (ref 22–31)
CREAT SERPL-MCNC: 0.54 MG/DL — SIGNIFICANT CHANGE UP (ref 0.5–1.3)
GLUCOSE BLDC GLUCOMTR-MCNC: 110 MG/DL — HIGH (ref 70–99)
GLUCOSE BLDC GLUCOMTR-MCNC: 113 MG/DL — HIGH (ref 70–99)
GLUCOSE BLDC GLUCOMTR-MCNC: 157 MG/DL — HIGH (ref 70–99)
GLUCOSE BLDC GLUCOMTR-MCNC: 200 MG/DL — HIGH (ref 70–99)
GLUCOSE SERPL-MCNC: 160 MG/DL — HIGH (ref 70–99)
HCT VFR BLD CALC: 34 % — LOW (ref 34.5–45)
HGB BLD-MCNC: 11.2 G/DL — LOW (ref 11.5–15.5)
INR BLD: 1.18 — HIGH (ref 0.88–1.16)
MAGNESIUM SERPL-MCNC: 1.6 MG/DL — SIGNIFICANT CHANGE UP (ref 1.6–2.6)
MCHC RBC-ENTMCNC: 30.4 PG — SIGNIFICANT CHANGE UP (ref 27–34)
MCHC RBC-ENTMCNC: 32.9 GM/DL — SIGNIFICANT CHANGE UP (ref 32–36)
MCV RBC AUTO: 92.1 FL — SIGNIFICANT CHANGE UP (ref 80–100)
NRBC # BLD: 0 /100 WBCS — SIGNIFICANT CHANGE UP (ref 0–0)
PLATELET # BLD AUTO: 186 K/UL — SIGNIFICANT CHANGE UP (ref 150–400)
POTASSIUM SERPL-MCNC: 3.3 MMOL/L — LOW (ref 3.5–5.3)
POTASSIUM SERPL-SCNC: 3.3 MMOL/L — LOW (ref 3.5–5.3)
PROTHROM AB SERPL-ACNC: 13.4 SEC — HIGH (ref 10–12.9)
RBC # BLD: 3.69 M/UL — LOW (ref 3.8–5.2)
RBC # FLD: 14.2 % — SIGNIFICANT CHANGE UP (ref 10.3–14.5)
SODIUM SERPL-SCNC: 138 MMOL/L — SIGNIFICANT CHANGE UP (ref 135–145)
WBC # BLD: 3.8 K/UL — SIGNIFICANT CHANGE UP (ref 3.8–10.5)
WBC # FLD AUTO: 3.8 K/UL — SIGNIFICANT CHANGE UP (ref 3.8–10.5)

## 2019-05-01 PROCEDURE — 93641 EP EVL 1/2CHMB PAC CVDFB TST: CPT | Mod: 26

## 2019-05-01 PROCEDURE — 33249 INSJ/RPLCMT DEFIB W/LEAD(S): CPT | Mod: 59

## 2019-05-01 PROCEDURE — 93010 ELECTROCARDIOGRAM REPORT: CPT

## 2019-05-01 RX ORDER — VANCOMYCIN HCL 1 G
1000 VIAL (EA) INTRAVENOUS ONCE
Qty: 0 | Refills: 0 | Status: COMPLETED | OUTPATIENT
Start: 2019-05-01 | End: 2019-05-01

## 2019-05-01 RX ORDER — VANCOMYCIN HCL 1 G
1000 VIAL (EA) INTRAVENOUS ONCE
Qty: 0 | Refills: 0 | Status: COMPLETED | OUTPATIENT
Start: 2019-05-02 | End: 2019-05-02

## 2019-05-01 RX ORDER — POTASSIUM CHLORIDE 20 MEQ
40 PACKET (EA) ORAL EVERY 4 HOURS
Qty: 0 | Refills: 0 | Status: COMPLETED | OUTPATIENT
Start: 2019-05-01 | End: 2019-05-01

## 2019-05-01 RX ADMIN — Medication 250 MILLIGRAM(S): at 15:15

## 2019-05-01 RX ADMIN — Medication 2: at 21:49

## 2019-05-01 RX ADMIN — Medication 40 MILLIEQUIVALENT(S): at 14:06

## 2019-05-01 RX ADMIN — CLOPIDOGREL BISULFATE 75 MILLIGRAM(S): 75 TABLET, FILM COATED ORAL at 14:06

## 2019-05-01 RX ADMIN — POLYETHYLENE GLYCOL 3350 17 GRAM(S): 17 POWDER, FOR SOLUTION ORAL at 18:10

## 2019-05-01 RX ADMIN — CHLORHEXIDINE GLUCONATE 1 APPLICATION(S): 213 SOLUTION TOPICAL at 07:05

## 2019-05-01 RX ADMIN — HEPARIN SODIUM 5000 UNIT(S): 5000 INJECTION INTRAVENOUS; SUBCUTANEOUS at 07:05

## 2019-05-01 RX ADMIN — SACUBITRIL AND VALSARTAN 1 TABLET(S): 24; 26 TABLET, FILM COATED ORAL at 07:05

## 2019-05-01 RX ADMIN — ATORVASTATIN CALCIUM 40 MILLIGRAM(S): 80 TABLET, FILM COATED ORAL at 21:50

## 2019-05-01 RX ADMIN — Medication 2: at 07:20

## 2019-05-01 RX ADMIN — INSULIN GLARGINE 8 UNIT(S): 100 INJECTION, SOLUTION SUBCUTANEOUS at 21:50

## 2019-05-01 RX ADMIN — Medication 12.5 MILLIGRAM(S): at 18:09

## 2019-05-01 RX ADMIN — SACUBITRIL AND VALSARTAN 1 TABLET(S): 24; 26 TABLET, FILM COATED ORAL at 18:09

## 2019-05-01 RX ADMIN — HEPARIN SODIUM 5000 UNIT(S): 5000 INJECTION INTRAVENOUS; SUBCUTANEOUS at 21:49

## 2019-05-01 RX ADMIN — Medication 12.5 MILLIGRAM(S): at 07:05

## 2019-05-01 RX ADMIN — Medication 40 MILLIEQUIVALENT(S): at 09:20

## 2019-05-01 RX ADMIN — Medication 650 MILLIGRAM(S): at 18:09

## 2019-05-01 RX ADMIN — Medication 81 MILLIGRAM(S): at 18:08

## 2019-05-01 RX ADMIN — CEFTRIAXONE 100 GRAM(S): 500 INJECTION, POWDER, FOR SOLUTION INTRAMUSCULAR; INTRAVENOUS at 07:04

## 2019-05-01 NOTE — PROGRESS NOTE ADULT - PROBLEM SELECTOR PLAN 1
Pt with sustained symptomatic V-tach requiring cardioversion and intubation for airway protection, s/p cath with rule-out of ischemic/coronary etiology. Cause remains unclear at this time, possible etiologies include ventricular myocardial scarring vs R-on-T phenomenon. Cath showed: dLM= 20% pLAD= 40% (MLA 5.0 on IVUS), patent stent mLAD= non obstructive CAD Lcx= luminal irregularities RCA=moderately calcific, patent stent, minimal non obstructive CAD.  - EP consulted, will need AICD prior to discharge  - went for cardiac MRI overnight, pending read  - stopped amiodarone 400 mg q12 PO per EP due to prolonged QT  - continue metoprolol 12.5 bid, transition to Toprol when stable or on discharge

## 2019-05-01 NOTE — PROGRESS NOTE ADULT - SUBJECTIVE AND OBJECTIVE BOX
Patient s/p successful single chamber ICD implantation to left chest wall with left cephalic vein access.  Patient received MAC for sedation.  Patient tolerated procedure well without complications.  No blood loss      Plans:  Bedrest for 2 hours  Armrest overnight  PA and lateral CXR in the AM  Device interrogation in the AM  Abx as prescribed  Resume all home medications

## 2019-05-01 NOTE — PROGRESS NOTE ADULT - SUBJECTIVE AND OBJECTIVE BOX
OVERNIGHT EVENTS: Pt had cardiac MRI performed overnight    SUBJECTIVE / INTERVAL HPI: Patient seen and examined at bedside. Pt is feeling improved. She does not have any complaints and has not had chest pain. She denies shortness of breath, headache, abdominal pain, nausea, fever, chills.    VITAL SIGNS:  Vital Signs Last 24 Hrs  T(C): 37.1 (01 May 2019 05:33), Max: 37.2 (30 Apr 2019 18:05)  T(F): 98.8 (01 May 2019 05:33), Max: 98.9 (30 Apr 2019 18:05)  HR: 74 (01 May 2019 05:14) (62 - 74)  BP: 123/60 (01 May 2019 05:14) (114/58 - 144/74)  BP(mean): 82 (01 May 2019 05:14) (72 - 118)  RR: 25 (01 May 2019 05:14) (17 - 33)  SpO2: 94% (01 May 2019 05:14) (92% - 98%)    PHYSICAL EXAM:    General: NAD, lying comfortably in bed  HEENT: NC/AT; PERRL, anicteric sclera; MMM  Neck: supple  Cardiovascular: +S1/S2, RRR, no murmurs heard  Respiratory: CTA B/L; no W/R/R, no crackles  Gastrointestinal: soft, NT/ND; +BSx4  Extremities: warm, well perfused; no edema, clubbing or cyanosis  Vascular: 2+ radial, DP/PT pulses B/L  Neurological: AAOx3; no focal deficits    MEDICATIONS:  MEDICATIONS  (STANDING):  aspirin  chewable 81 milliGRAM(s) Oral every 24 hours  atorvastatin 40 milliGRAM(s) Oral at bedtime  chlorhexidine 2% Cloths 1 Application(s) Topical <User Schedule>  clopidogrel Tablet 75 milliGRAM(s) Oral daily  heparin  Injectable 5000 Unit(s) SubCutaneous every 8 hours  insulin glargine Injectable (LANTUS) 8 Unit(s) SubCutaneous at bedtime  insulin lispro (HumaLOG) corrective regimen sliding scale   SubCutaneous Before meals and at bedtime  metoprolol tartrate 12.5 milliGRAM(s) Oral every 12 hours  polyethylene glycol 3350 17 Gram(s) Oral daily  potassium chloride    Tablet ER 40 milliEquivalent(s) Oral every 4 hours  sacubitril 24 mG/valsartan 26 mG 1 Tablet(s) Oral two times a day    MEDICATIONS  (PRN):  acetaminophen   Tablet .. 650 milliGRAM(s) Oral every 6 hours PRN Temp greater or equal to 38C (100.4F), Mild Pain (1 - 3)      ALLERGIES:  Allergies    No Known Allergies    Intolerances        LABS:                        11.2   3.80  )-----------( 186      ( 01 May 2019 07:07 )             34.0     05-01    138  |  101  |  8   ----------------------------<  160<H>  3.3<L>   |  26  |  0.54    Ca    9.2      01 May 2019 07:06  Phos  3.3     04-30  Mg     1.6     05-01      PT/INR - ( 01 May 2019 07:07 )   PT: 13.4 sec;   INR: 1.18          PTT - ( 01 May 2019 07:07 )  PTT:31.9 sec  Urinalysis Basic - ( 29 Apr 2019 21:06 )    Color: x / Appearance: x / SG: x / pH: x  Gluc: x / Ketone: x  / Bili: x / Urobili: x   Blood: x / Protein: x / Nitrite: x   Leuk Esterase: x / RBC: Many /HPF / WBC 5-10 /HPF   Sq Epi: x / Non Sq Epi: 0-5 /HPF / Bacteria: Present /HPF      CAPILLARY BLOOD GLUCOSE      POCT Blood Glucose.: 157 mg/dL (01 May 2019 07:07)      RADIOLOGY & ADDITIONAL TESTS: Reviewed.

## 2019-05-01 NOTE — PROGRESS NOTE ADULT - ASSESSMENT
78yo F with FHx CAD and PMHx of HLD, HTN, NIDDM-II, right breast CA s/p lumpectomy and chemo (in remission), and CAD s/p PCI x 3 (last 2008 @ [facility]) who presented with exertional SOB x 2 weeks, admitted for unstable angina, with course c/b episode of sustained symptomatic V-Tach requiring cardioversion and intubation, s/p cardiac catheterization with no significant coronary disease. She is now extubated and hemodynamically stable on amiodarone for VT and with plan for ICD prior to discharge.

## 2019-05-01 NOTE — PROGRESS NOTE ADULT - PROBLEM SELECTOR PLAN 8
1) PCP Contacted on Admission: (Y/N) --> Name & Phone #: PCP Dr. Cade Mascorro [phone]. Cardiologist Dr. David Pulliam [phone]  2) Date of Contact with PCP: updated PCP and Dr. Pulliam on 4/30/2019  3) PCP Contacted at Discharge: (Y/N)  4) Summary of Handoff Given to PCP:   5) Post-Discharge Appointment Date and Location:

## 2019-05-01 NOTE — PROGRESS NOTE ADULT - SUBJECTIVE AND OBJECTIVE BOX
Pt improved  s/p Non Obstructive CAD    s/p Arrythmia  and Respiratory failure  Intubation     extubation and improvement       PAST MEDICAL & SURGICAL HISTORY:  CAD (coronary artery disease): s/p MI &amp; stent x1 in 05, x1 in 06 &amp; x1 in 08 in [facility]  Hx of breast cancer  Essential hypertension  Hypercholesterolemia  Diabetes mellitus  H/O lumpectomy: 2001  Status post coronary artery stent placement: x3, 2005, 2006, 2008  H/O: hysterectomy: 2008    MEDICATIONS  (STANDING):  aspirin  chewable 81 milliGRAM(s) Oral every 24 hours  atorvastatin 40 milliGRAM(s) Oral at bedtime  chlorhexidine 2% Cloths 1 Application(s) Topical <User Schedule>  clopidogrel Tablet 75 milliGRAM(s) Oral daily  heparin  Injectable 5000 Unit(s) SubCutaneous every 8 hours  insulin glargine Injectable (LANTUS) 8 Unit(s) SubCutaneous at bedtime  insulin lispro (HumaLOG) corrective regimen sliding scale   SubCutaneous Before meals and at bedtime  metoprolol tartrate 12.5 milliGRAM(s) Oral every 12 hours  polyethylene glycol 3350 17 Gram(s) Oral daily  potassium chloride    Tablet ER 40 milliEquivalent(s) Oral every 4 hours  sacubitril 24 mG/valsartan 26 mG 1 Tablet(s) Oral two times a day    MEDICATIONS  (PRN):  acetaminophen   Tablet .. 650 milliGRAM(s) Oral every 6 hours PRN Temp greater or equal to 38C (100.4F), Mild Pain (1 - 3)    Vital Signs Last 24 Hrs  T(C): 37.1 (01 May 2019 05:33), Max: 37.2 (30 Apr 2019 18:05)  T(F): 98.8 (01 May 2019 05:33), Max: 98.9 (30 Apr 2019 18:05)  HR: 74 (01 May 2019 05:14) (62 - 74)  BP: 123/60 (01 May 2019 05:14) (114/58 - 144/74)  BP(mean): 82 (01 May 2019 05:14) (72 - 118)      RR: 25 (01 May 2019 05:14) (17 - 33)  SpO2: 94% (01 May 2019 05:14) (92% - 98%)      Lungs clear  Cv s1 s2  abd soft  ext stable    echo   Normal EF   CXR                             11.2   3.80  )-----------( 186      ( 01 May 2019 07:07 )             34.0   05-01    138  |  101  |  8   ----------------------------<  160<H>  3.3<L>   |  26  |  0.54    Ca    9.2      01 May 2019 07:06  Phos  3.3     04-30  Mg     1.6     05-01 Pt improved  s/p Non Obstructive CAD    s/p  Ventricular Arrythmia  and Respiratory failure  Intubation     extubation and improvement       PAST MEDICAL & SURGICAL HISTORY:  CAD (coronary artery disease): s/p MI &amp; stent x1 in 05, x1 in 06 &amp; x1 in 08 in [facility]  Hx of breast cancer  Essential hypertension  Hypercholesterolemia  Diabetes mellitus  H/O lumpectomy: 2001  Status post coronary artery stent placement: x3, 2005, 2006, 2008  H/O: hysterectomy: 2008    MEDICATIONS  (STANDING):  aspirin  chewable 81 milliGRAM(s) Oral every 24 hours  atorvastatin 40 milliGRAM(s) Oral at bedtime  chlorhexidine 2% Cloths 1 Application(s) Topical <User Schedule>  clopidogrel Tablet 75 milliGRAM(s) Oral daily  heparin  Injectable 5000 Unit(s) SubCutaneous every 8 hours  insulin glargine Injectable (LANTUS) 8 Unit(s) SubCutaneous at bedtime  insulin lispro (HumaLOG) corrective regimen sliding scale   SubCutaneous Before meals and at bedtime  metoprolol tartrate 12.5 milliGRAM(s) Oral every 12 hours  polyethylene glycol 3350 17 Gram(s) Oral daily  potassium chloride    Tablet ER 40 milliEquivalent(s) Oral every 4 hours  sacubitril 24 mG/valsartan 26 mG 1 Tablet(s) Oral two times a day    MEDICATIONS  (PRN):  acetaminophen   Tablet .. 650 milliGRAM(s) Oral every 6 hours PRN Temp greater or equal to 38C (100.4F), Mild Pain (1 - 3)    Vital Signs Last 24 Hrs  T(C): 37.1 (01 May 2019 05:33), Max: 37.2 (30 Apr 2019 18:05)  T(F): 98.8 (01 May 2019 05:33), Max: 98.9 (30 Apr 2019 18:05)  HR: 74 (01 May 2019 05:14) (62 - 74)  BP: 123/60 (01 May 2019 05:14) (114/58 - 144/74)  BP(mean): 82 (01 May 2019 05:14) (72 - 118)      RR: 25 (01 May 2019 05:14) (17 - 33)  SpO2: 94% (01 May 2019 05:14) (92% - 98%)      Lungs clear  Cv s1 s2  abd soft  ext stable    echo   Normal EF   CXR                             11.2   3.80  )-----------( 186      ( 01 May 2019 07:07 )             34.0   05-01    138  |  101  |  8   ----------------------------<  160<H>  3.3<L>   |  26  |  0.54    Ca    9.2      01 May 2019 07:06  Phos  3.3     04-30  Mg     1.6     05-01

## 2019-05-01 NOTE — PROGRESS NOTE ADULT - ASSESSMENT
Non Obstructive CAD      Arrythmia      cont med Rx     NIKKIE Camp Non Obstructive CAD      Ventricular Arrythmia    await MRI  reprot    for AICD   cont med Rx     NIKKIE Camp

## 2019-05-01 NOTE — DIETITIAN INITIAL EVALUATION ADULT. - ENERGY NEEDS
Height: 5'6" Weight: 146.4 lbs, 149.9 lbs (4/26), 150.5 lbs (4/28), 147.2 lbs (4/28), 154.5 lbs (4/29), 151.4 lbs (4/30),  lbs+/-10%, %%, BMI 23.6,   ABW used to calculate EER as per pt's current body weight is within % IBW   Estimated nutrient needs based on [facility] SOC for maintenance in older adults

## 2019-05-01 NOTE — DIETITIAN INITIAL EVALUATION ADULT. - SIGNS/SYMPTOMS
AEB pt with poor <45% PO intake of meals AEB pt requiring nutrition teaching for T2DM and DASH/TLC diet management

## 2019-05-01 NOTE — DIETITIAN INITIAL EVALUATION ADULT. - OTHER INFO
79 y.o F from home with FHx of CAD and PMHx of HLD, HTN, DM, R breast CA s/p lumpectomy and chemo in remission, CAD s/p PCI x3. Pt admitted for unstable angina, transferred to CCU from [facility] for V-Tach underwent cardioversion and s/p intubation and underwent cardiac cath now s/p extubation 4/29. Stepped down to [facility] underwent cardiac MRI and plan for ICD placement. Pt and daughter cooks at home, follows regular diet but tries to monitor food intake, does not add salt, BG controlled at home with good appetite, NKFA. Pt reports weight loss of 8 lbs x 2 months, unsure etiology. Pt takes Atorvastatin per home meds. -158 lbs per pt. NPO for ICD placement, tolerated DASH/TLC diet well, yesterday with poor <45% PO intake but endorse hunger today. Will defer from ONS at this time as per pt with anorexia associated with s/p extubation. Denies N/V/D/C or pain. +BM 4/31. Skin intact. Nutrition edu provided to pt with handouts. RD will f/u per moderate risk protocol.

## 2019-05-01 NOTE — DIETITIAN INITIAL EVALUATION ADULT. - PROBLEM SELECTOR PLAN 1
CP free and HD stable  - monitor on tele  - continue ASA 81mg QD, lipitor 40mg QHS and toprol XL 25mg QD; loaded with plavix 600mg   - NS @ 75cc/hr pre-cath fluids  - trops negative x 1, follow up repeats  - follow up echo  - NPO for cardiac cath today with Dr. Dumont

## 2019-05-02 ENCOUNTER — TRANSCRIPTION ENCOUNTER (OUTPATIENT)
Age: 79
End: 2019-05-02

## 2019-05-02 VITALS
OXYGEN SATURATION: 98 % | HEART RATE: 66 BPM | RESPIRATION RATE: 20 BRPM | DIASTOLIC BLOOD PRESSURE: 69 MMHG | SYSTOLIC BLOOD PRESSURE: 114 MMHG

## 2019-05-02 LAB
ANION GAP SERPL CALC-SCNC: 9 MMOL/L — SIGNIFICANT CHANGE UP (ref 5–17)
BUN SERPL-MCNC: 10 MG/DL — SIGNIFICANT CHANGE UP (ref 7–23)
CALCIUM SERPL-MCNC: 9.2 MG/DL — SIGNIFICANT CHANGE UP (ref 8.4–10.5)
CHLORIDE SERPL-SCNC: 99 MMOL/L — SIGNIFICANT CHANGE UP (ref 96–108)
CO2 SERPL-SCNC: 26 MMOL/L — SIGNIFICANT CHANGE UP (ref 22–31)
CREAT SERPL-MCNC: 0.56 MG/DL — SIGNIFICANT CHANGE UP (ref 0.5–1.3)
CULTURE RESULTS: SIGNIFICANT CHANGE UP
CULTURE RESULTS: SIGNIFICANT CHANGE UP
GLUCOSE BLDC GLUCOMTR-MCNC: 163 MG/DL — HIGH (ref 70–99)
GLUCOSE BLDC GLUCOMTR-MCNC: 269 MG/DL — HIGH (ref 70–99)
GLUCOSE SERPL-MCNC: 182 MG/DL — HIGH (ref 70–99)
HCT VFR BLD CALC: 35.1 % — SIGNIFICANT CHANGE UP (ref 34.5–45)
HGB BLD-MCNC: 11.4 G/DL — LOW (ref 11.5–15.5)
MAGNESIUM SERPL-MCNC: 1.6 MG/DL — SIGNIFICANT CHANGE UP (ref 1.6–2.6)
MCHC RBC-ENTMCNC: 31 PG — SIGNIFICANT CHANGE UP (ref 27–34)
MCHC RBC-ENTMCNC: 32.5 GM/DL — SIGNIFICANT CHANGE UP (ref 32–36)
MCV RBC AUTO: 95.4 FL — SIGNIFICANT CHANGE UP (ref 80–100)
NRBC # BLD: 0 /100 WBCS — SIGNIFICANT CHANGE UP (ref 0–0)
PLATELET # BLD AUTO: 188 K/UL — SIGNIFICANT CHANGE UP (ref 150–400)
POTASSIUM SERPL-MCNC: 4.2 MMOL/L — SIGNIFICANT CHANGE UP (ref 3.5–5.3)
POTASSIUM SERPL-SCNC: 4.2 MMOL/L — SIGNIFICANT CHANGE UP (ref 3.5–5.3)
RBC # BLD: 3.68 M/UL — LOW (ref 3.8–5.2)
RBC # FLD: 14.5 % — SIGNIFICANT CHANGE UP (ref 10.3–14.5)
SODIUM SERPL-SCNC: 134 MMOL/L — LOW (ref 135–145)
SPECIMEN SOURCE: SIGNIFICANT CHANGE UP
SPECIMEN SOURCE: SIGNIFICANT CHANGE UP
WBC # BLD: 4.6 K/UL — SIGNIFICANT CHANGE UP (ref 3.8–10.5)
WBC # FLD AUTO: 4.6 K/UL — SIGNIFICANT CHANGE UP (ref 3.8–10.5)

## 2019-05-02 PROCEDURE — 71046 X-RAY EXAM CHEST 2 VIEWS: CPT | Mod: 26

## 2019-05-02 PROCEDURE — 93279 PRGRMG DEV EVAL PM/LDLS PM: CPT | Mod: 26

## 2019-05-02 PROCEDURE — 93010 ELECTROCARDIOGRAM REPORT: CPT

## 2019-05-02 RX ORDER — AMLODIPINE BESYLATE 2.5 MG/1
1 TABLET ORAL
Qty: 0 | Refills: 0 | COMMUNITY

## 2019-05-02 RX ORDER — MAGNESIUM SULFATE 500 MG/ML
2 VIAL (ML) INJECTION ONCE
Qty: 0 | Refills: 0 | Status: COMPLETED | OUTPATIENT
Start: 2019-05-02 | End: 2019-05-02

## 2019-05-02 RX ORDER — SACUBITRIL AND VALSARTAN 24; 26 MG/1; MG/1
1 TABLET, FILM COATED ORAL
Qty: 60 | Refills: 0
Start: 2019-05-02 | End: 2019-05-31

## 2019-05-02 RX ADMIN — Medication 650 MILLIGRAM(S): at 07:08

## 2019-05-02 RX ADMIN — Medication 81 MILLIGRAM(S): at 10:21

## 2019-05-02 RX ADMIN — Medication 6: at 12:01

## 2019-05-02 RX ADMIN — Medication 2: at 07:16

## 2019-05-02 RX ADMIN — Medication 50 GRAM(S): at 10:21

## 2019-05-02 RX ADMIN — POLYETHYLENE GLYCOL 3350 17 GRAM(S): 17 POWDER, FOR SOLUTION ORAL at 10:21

## 2019-05-02 RX ADMIN — HEPARIN SODIUM 5000 UNIT(S): 5000 INJECTION INTRAVENOUS; SUBCUTANEOUS at 05:34

## 2019-05-02 RX ADMIN — Medication 650 MILLIGRAM(S): at 06:39

## 2019-05-02 RX ADMIN — CLOPIDOGREL BISULFATE 75 MILLIGRAM(S): 75 TABLET, FILM COATED ORAL at 10:21

## 2019-05-02 RX ADMIN — Medication 250 MILLIGRAM(S): at 03:29

## 2019-05-02 RX ADMIN — Medication 12.5 MILLIGRAM(S): at 05:34

## 2019-05-02 RX ADMIN — SACUBITRIL AND VALSARTAN 1 TABLET(S): 24; 26 TABLET, FILM COATED ORAL at 05:34

## 2019-05-02 NOTE — PROGRESS NOTE ADULT - PROBLEM SELECTOR PLAN 3
ACS ruled out. Although presented with persistent chest pain, s/p cath without coronary pathology or stenting.  -c/w ASA, Plavix, Lipitor 80  -holding beta blockade in setting of bradycardia
Initially was admitted for unstable angina, however ACS ruled out with cath showing no significant coronary disease.  - c/w ASA, Plavix, Lipitor 80  - continue metoprolol 12.5 bid
Initially was admitted for unstable angina, however ACS ruled out with cath showing no significant coronary disease.  -c/w ASA, Plavix, Lipitor 80  -continue metoprolol 12.5 bid
Initially was admitted for unstable angina, however ACS ruled out with cath showing no significant coronary disease.  -c/w ASA, Plavix, Lipitor 80  -continue metoprolol 12.5 bid
Pt with significant jump in white count post-code (possibly reactive), fever to 100.6. Lower SVR suggesting possible septic etiology, most likely would be CAP vs. aspiration PNA.   -Ceftriaxone (4/27 - )  -d/c azithro (4/27) due to QT prolongation on amiodarone, less likely atypical  -f/u blood cultures
Initially was admitted for unstable angina, however ACS ruled out with cath showing no significant coronary disease.  - c/w ASA, Plavix, Lipitor 80  - continue metoprolol 12.5 bid
Presenting with persistent chest pain, s/p cath without coronary pathology or stenting.  -c/w ASA, Plavix, Lipitor 80  -holding beta blockade in setting of bradycardia

## 2019-05-02 NOTE — DISCHARGE NOTE NURSING/CASE MANAGEMENT/SOCIAL WORK - NSDCPEPT PROEDHF_GEN_ALL_CORE
Call primary care provider for follow up after discharge/Activities as tolerated/Monitor weight daily/Low salt diet/Report signs and symptoms to primary care provider

## 2019-05-02 NOTE — DISCHARGE NOTE PROVIDER - CARE PROVIDER_API CALL
Abdi Camp)  [place] [address]  Phone: [phone]  Fax: [phone]  Follow Up Time: Abdi Camp)  Medicine  [address] [place] [address]  Phone: [phone]  Fax: [phone]  Follow Up Time:     Minh Sesay)  Cardiac Electrophysiology; Cardiovascular Disease; Internal Medicine  [address] [place] [address]  Phone: [phone]  Fax: [phone]  Follow Up Time:

## 2019-05-02 NOTE — DISCHARGE NOTE PROVIDER - NSDCFUADDAPPT_GEN_ALL_CORE_FT
Please follow up with Dr. Camp within one week of discharge. Please follow up with Dr. Camp within one week of discharge.    Please follow up with Dr. Sesay on Brenda 10 at 10:50am.

## 2019-05-02 NOTE — PROGRESS NOTE ADULT - PROBLEM SELECTOR PLAN 6
- continue metoprolol 12.5 bid  - adding low dose entresto
- continue metoprolol 12.5 bid  - adding low dose entresto
- continue metoprolol 12.5 bid  - c/w entresto
Currently hypo-normotensive and on pressors. Holding beta blockade in setting of bradycardia regardless.  - no antihypertensives  - plan to try and add entresto for HFrEF
F: none  E: replete PRN  N: NPO  DVT PPx: SQH  GI PPX: PPI  FULL CODE  Dispo: CCU
- continue metoprolol 12.5 bid  - c/w entresto
F: none  E: replete PRN  N: NPO  DVT PPx: SQH  FULL CODE  Dispo: CCU

## 2019-05-02 NOTE — PROGRESS NOTE ADULT - PROBLEM SELECTOR PROBLEM 3
CAD (coronary artery disease)
R/O Sepsis
Unstable angina
CAD (coronary artery disease)
Unstable angina

## 2019-05-02 NOTE — PROGRESS NOTE ADULT - SUBJECTIVE AND OBJECTIVE BOX
OVERNIGHT EVENTS:    SUBJECTIVE:    Vital Signs Last 12 Hrs  T(F): 97.1 (05-02-19 @ 05:49), Max: 98.5 (05-01-19 @ 22:59)  HR: 93 (05-02-19 @ 04:07) (58 - 93)  BP: 111/61 (05-02-19 @ 04:07) (92/54 - 111/61)  BP(mean): 69 (05-01-19 @ 19:32) (69 - 86)  RR: 17 (05-02-19 @ 04:07) (15 - 17)  SpO2: 94% (05-02-19 @ 04:07) (90% - 97%)  I&O's Summary    30 Apr 2019 07:01  -  01 May 2019 07:00  --------------------------------------------------------  IN: 530 mL / OUT: 1942 mL / NET: -1412 mL    01 May 2019 07:01  -  02 May 2019 06:58  --------------------------------------------------------  IN: 475 mL / OUT: 1100 mL / NET: -625 mL        PHYSICAL EXAM:  Constitutional: NAD, comfortable in bed.  HEENT: NC/AT, PERRLA, EOMI, no conjunctival pallor or scleral icterus, MMM  Neck: Supple, no JVD  Respiratory: Normal rate, rhythm, depth, effort. CTAB. No w/r/r.   Cardiovascular: RRR, normal S1 and S2, no m/r/g.   Gastrointestinal: +BS, soft NTND, no guarding or rebound tenderness, no palpable masses   Extremities: wwp; no cyanosis, clubbing or edema.   Vascular: Pulses equal and strong throughout.   Neurological: AAOx3, no CN deficits, strength and sensation intact throughout.   Skin: No gross skin abnormalities or rashes        LABS:                        11.2   3.80  )-----------( 186      ( 01 May 2019 07:07 )             34.0     05-01    138  |  101  |  8   ----------------------------<  160<H>  3.3<L>   |  26  |  0.54    Ca    9.2      01 May 2019 07:06  Mg     1.6     05-01      PT/INR - ( 01 May 2019 07:07 )   PT: 13.4 sec;   INR: 1.18          PTT - ( 01 May 2019 07:07 )  PTT:31.9 sec      RADIOLOGY & ADDITIONAL TESTS:    MEDICATIONS  (STANDING):  aspirin  chewable 81 milliGRAM(s) Oral every 24 hours  atorvastatin 40 milliGRAM(s) Oral at bedtime  clopidogrel Tablet 75 milliGRAM(s) Oral daily  heparin  Injectable 5000 Unit(s) SubCutaneous every 8 hours  insulin glargine Injectable (LANTUS) 8 Unit(s) SubCutaneous at bedtime  insulin lispro (HumaLOG) corrective regimen sliding scale   SubCutaneous Before meals and at bedtime  metoprolol tartrate 12.5 milliGRAM(s) Oral every 12 hours  polyethylene glycol 3350 17 Gram(s) Oral daily  sacubitril 24 mG/valsartan 26 mG 1 Tablet(s) Oral two times a day    MEDICATIONS  (PRN):  acetaminophen   Tablet .. 650 milliGRAM(s) Oral every 6 hours PRN Temp greater or equal to 38C (100.4F), Mild Pain (1 - 3)

## 2019-05-02 NOTE — PROGRESS NOTE ADULT - PROBLEM SELECTOR PLAN 5
-c/w sliding scale insulin   -f/u A1C  -should not be on pioglitazone on discharge due to heart failure
A1C 7.9%  - c/w sliding scale insulin   - continue 8u lantus at bedtime  -should not be on pioglitazone on discharge due to heart failure
A1C 7.9%  -c/w sliding scale insulin   -continue glargine at bedtime  -should not be on pioglitazone on discharge due to heart failure
A1C 7.9%  -c/w sliding scale insulin   -continue glargine at bedtime  -should not be on pioglitazone on discharge due to heart failure
Currently hypo-normotensive and on pressors. Would holding beta blockade in setting of bradycardia regardless.  - no antihypertensives
A1C 7.9%  - c/w sliding scale insulin   - continue 8u lantus at bedtime  -should not be on pioglitazone on discharge due to heart failure
-normotensive, holding beta blockade in setting of bradycardia

## 2019-05-02 NOTE — DISCHARGE NOTE NURSING/CASE MANAGEMENT/SOCIAL WORK - NSDCFUADDAPPT_GEN_ALL_CORE_FT
Please follow up with Dr. Camp within one week of discharge.    Please follow up with Dr. Sesay on Brenda 10 at 10:50am.

## 2019-05-02 NOTE — PROGRESS NOTE ADULT - PROBLEM SELECTOR PLAN 8
1) PCP Contacted on Admission: (Y/N) --> Name & Phone #: PCP Dr. Cade Mascorro [phone]. Cardiologist Dr. David Pulliam [phone]  2) Date of Contact with PCP: updated PCP and Dr. Pulliam on 4/30/2019  3) PCP Contacted at Discharge: (Y/N)  4) Summary of Handoff Given to PCP:   5) Post-Discharge Appointment Date and Location:

## 2019-05-02 NOTE — PROGRESS NOTE ADULT - ASSESSMENT
78yo F with FHx CAD and PMHx of HLD, HTN, NIDDM-II, right breast CA s/p lumpectomy and chemo (in remission), and CAD s/p PCI x 3 (last 2008 @ [facility]) who presented with exertional SOB x 2 weeks, admitted for unstable angina, with course c/b episode of sustained symptomatic V-Tach requiring cardioversion and intubation, s/p cardiac catheterization with no significant coronary disease. She is now extubated and hemodynamically stable on amiodarone for VT and with plan for ICD prior to discharge.

## 2019-05-02 NOTE — DISCHARGE NOTE PROVIDER - PROVIDER TOKENS
PROVIDER:[TOKEN:[[id number]]] PROVIDER:[TOKEN:[[id number]]],PROVIDER:[TOKEN:[[id number]]]

## 2019-05-02 NOTE — PROGRESS NOTE ADULT - PROBLEM SELECTOR PROBLEM 6
Essential hypertension
Nutrition, metabolism, and development symptoms
Nutrition, metabolism, and development symptoms
Essential hypertension

## 2019-05-02 NOTE — DISCHARGE NOTE PROVIDER - HOSPITAL COURSE
80yo F with FHx CAD and PMHx of HLD, HTN, NIDDM-II, R breast CA s/p lumpectomy and chemo (in remission), and CAD s/p PCI x 3 (last 2008 @ [facility]) who presented with exertional SOB and chest pressure x 2 weeks. She was initially admitted to [facility] for management of unstable angina; however at ~11:09PM on 4/26 the patient was noted to have symptomatic sustained VT when walking to bathroom. She was shocked with sinus rhythm restored, however began to develop agonal breathing requiring intubation and transfer to the CCU. She was taken emergently to the cath lab, where she had IABP placed and the following findings: dLM= 20% pLAD= 40% (MLA 5.0 on IVUS), patent stent mLAD= non obstructive CAD Lcx= luminal irregularities RCA=moderately calcific, patent stent, minimal non obstructive CAD LVEDP= 14 mmHg LVEF= 35%. Echocardiogram showed EF 35% with inferior wall akinesis. In the CCU, she was loaded with amiodarone IV with eventual transition to PO and addition of metoprolol and Entresto for her HFrEF. She was successfully extubated on 4/29. On 5/1 she underwent successful placement of single chamber ICD implantation to left chest wall with left cephalic vein access. Pt tolerated the procedure well. Prior to discharge, device was interrogated. On the day of discharge, pt was seen and examined. She was without any acute complaints and deemed medically cleared for discharge. 80yo F with FHx CAD and PMHx of HLD, HTN, NIDDM-II, R breast CA s/p lumpectomy and chemo (in remission), and CAD s/p PCI x 3 (last 2008 @ [facility]) who presented with exertional SOB and chest pressure x 2 weeks. She was initially admitted to [facility] for management of unstable angina; however at ~11:09PM on 4/26 the patient was noted to have symptomatic sustained VT when walking to bathroom. She was shocked with sinus rhythm restored, however began to develop agonal breathing requiring intubation and transfer to the CCU. She was taken emergently to the cath lab, where she had IABP placed and the following findings: dLM= 20% pLAD= 40% (MLA 5.0 on IVUS), patent stent mLAD= non obstructive CAD Lcx= luminal irregularities RCA=moderately calcific, patent stent, minimal non obstructive CAD LVEDP= 14 mmHg LVEF= 35%. Echocardiogram showed EF 35% with inferior wall akinesis. In the CCU, she was loaded with amiodarone IV with eventual transition to PO and addition of metoprolol and Entresto for her HFrEF. She was successfully extubated on 4/29. On 5/1 she underwent successful placement of single chamber ICD implantation to left chest wall with left cephalic vein access. Pt tolerated the procedure well. Prior to discharge, device was interrogated by electrophysiology. On the day of discharge, pt was seen and examined. She was without any acute complaints and deemed medically cleared for discharge.

## 2019-05-02 NOTE — PROGRESS NOTE ADULT - REASON FOR ADMISSION
unstable angina

## 2019-05-02 NOTE — PROGRESS NOTE ADULT - SUBJECTIVE AND OBJECTIVE BOX
EPS Device interrogation    Indication: post implant    Device model: 	Quillate  			Implanting Physician:     Functioning Mode: VVI 40			    Underlying Rhythm: VS    Pacemaker dependency:  No    Battery status: 100% (NEREIDA), 12 years  Interrogating parameters:   				RA			RV			LV    Sense:                                                                              14.9 mV    Threshold:                                                                0.4 V@ 0.5 ms    Pacing Impedance:                                                              391   om    Shock Impedance:                                                                   53  om    Events/Alert:  none    Parameter change: 	none    For ICD only:  tachy therapy – unchanged   Normal function ICD  site of implant dry/clean, no bleeding, no swelling, no hematoma   Follow up  6/10/19 @ 10:50 am  [ ]EPS attending: Interrogation reviewed. Agree with above.

## 2019-05-02 NOTE — PROGRESS NOTE ADULT - PROBLEM SELECTOR PLAN 7
1) PCP Contacted on Admission: (Y/N) --> Name & Phone #:  2) Date of Contact with PCP:  3) PCP Contacted at Discharge: (Y/N)  4) Summary of Handoff Given to PCP:   5) Post-Discharge Appointment Date and Location:
F: none  E: replete PRN  N: DASH  DVT PPx: SQH  GI PPX: not needed  FULL CODE  Dispo: [facility]
F: none  E: replete PRN  N: DASH  DVT PPx: SQH  GI PPX: not needed  FULL CODE  Dispo: stable for stepdown
F: none  E: replete PRN  N: DASH  DVT PPx: SQH  GI PPX: not needed  FULL CODE  Dispo: stable for stepdown
F: none  E: replete PRN  N: NPO, diet after  DVT PPx: SQH  GI PPX: PPI  FULL CODE  Dispo: CCU
F: none  E: replete PRN  N: DASH  DVT PPx: SQH  GI PPX: not needed  FULL CODE  Dispo: [facility]
1) PCP Contacted on Admission: (Y/N) --> Name & Phone #:  2) Date of Contact with PCP:  3) PCP Contacted at Discharge: (Y/N)  4) Summary of Handoff Given to PCP:   5) Post-Discharge Appointment Date and Location:

## 2019-05-02 NOTE — DISCHARGE NOTE NURSING/CASE MANAGEMENT/SOCIAL WORK - NSDCDPATPORTLINK_GEN_ALL_CORE
You can access the [facility] Patient Portal, offered by [facility], by registering with the following website: [email or web address]

## 2019-05-02 NOTE — PROGRESS NOTE ADULT - PROBLEM SELECTOR PROBLEM 7
Transition of care performed with sharing of clinical summary
Nutrition, metabolism, and development symptoms
Transition of care performed with sharing of clinical summary
Nutrition, metabolism, and development symptoms

## 2019-05-02 NOTE — PROGRESS NOTE ADULT - SUBJECTIVE AND OBJECTIVE BOX
Pt is S/p AICD implantation    doing well post procedure     PAST MEDICAL & SURGICAL HISTORY:  CAD (coronary artery disease): s/p MI &amp; stent x1 in 05, x1 in 06 &amp; x1 in 08 in [facility]  Hx of breast cancer  Essential hypertension  Hypercholesterolemia  Diabetes mellitus  H/O lumpectomy: 2001  Status post coronary artery stent placement: x3, 2005, 2006, 2008  H/O: hysterectomy: 2008    MEDICATIONS  (STANDING):  aspirin  chewable 81 milliGRAM(s) Oral every 24 hours  atorvastatin 40 milliGRAM(s) Oral at bedtime  clopidogrel Tablet 75 milliGRAM(s) Oral daily  heparin  Injectable 5000 Unit(s) SubCutaneous every 8 hours  insulin glargine Injectable (LANTUS) 8 Unit(s) SubCutaneous at bedtime  insulin lispro (HumaLOG) corrective regimen sliding scale   SubCutaneous Before meals and at bedtime  magnesium sulfate  IVPB 2 Gram(s) IV Intermittent once  metoprolol tartrate 12.5 milliGRAM(s) Oral every 12 hours  polyethylene glycol 3350 17 Gram(s) Oral daily  sacubitril 24 mG/valsartan 26 mG 1 Tablet(s) Oral two times a day    MEDICATIONS  (PRN):  acetaminophen   Tablet .. 650 milliGRAM(s) Oral every 6 hours PRN Temp greater or equal to 38C (100.4F), Mild Pain (1 - 3)    ICU Vital Signs Last 24 Hrs  T(C): 36.2 (02 May 2019 05:49), Max: 36.9 (01 May 2019 13:13)  T(F): 97.1 (02 May 2019 05:49), Max: 98.5 (01 May 2019 22:59)  HR: 60 (02 May 2019 08:32) (58 - 93)  BP: 103/67 (02 May 2019 08:32) (92/54 - 112/62)  BP(mean): 76 (02 May 2019 08:32) (69 - 87)  ABP: --  ABP(mean): --  RR: 20 (02 May 2019 08:32) (15 - 20)  SpO2: 96% (02 May 2019 08:32) (90% - 97%)      Lung s  clearer  CV s1 s2  abd soft   Ext stable                          11.4   4.60  )-----------( 188      ( 02 May 2019 06:57 )             35.1   05-02    134<L>  |  99  |  10  ----------------------------<  182<H>  4.2   |  26  |  0.56    Ca    9.2      02 May 2019 06:57  Mg     1.6     05-02

## 2019-05-02 NOTE — DISCHARGE NOTE PROVIDER - NSDCCPCAREPLAN_GEN_ALL_CORE_FT
PRINCIPAL DISCHARGE DIAGNOSIS  Diagnosis: Acute coronary syndrome  Assessment and Plan of Treatment: You came to the hospital with chest pain and shortness of breath. During your admission, you developed an arrhythmia, called ventricular tachycardia, that required an electrical shock to reset your heart. PRINCIPAL DISCHARGE DIAGNOSIS  Diagnosis: Acute coronary syndrome  Assessment and Plan of Treatment: You came to the hospital with chest pain and shortness of breath. During your admission, you developed an arrhythmia, called ventricular tachycardia, that required an electrical shock to reset your heart. You underwent a cardiac catheterization which showed nonobstructive coronary artery disease, and an intraortic balloon pump was placed and then subsequently removed prior to your discharged. You were intubated prior to the catheterization to protect your airway and subsequently extubated. You were given medications, including amiodarone to control your heart rate. Before discharge, an automatic inplantable cardioverter-defibrilator (AICD) was placed and interrogated before your discharge to ensure proper functioning. Please call Dr. Camp to follow up your cardiac care after your discharge.      SECONDARY DISCHARGE DIAGNOSES  Diagnosis: CAD (coronary artery disease)  Assessment and Plan of Treatment: You had evidence of coronary artery disease as you presented with chest pain and shortness of breath. Cardiac catheterization revealed nonobstructive coronary artery disease, which was managed as above. Please follow up with Dr. Camp.    Diagnosis: Heart failure with reduced ejection fraction  Assessment and Plan of Treatment: You have a history of heart failure. Your condition was monitored closely during your hospitalization. You were given diuretics. Please continue to take your medications as prescribed, and follow up with Dr. Camp within 1 week of discharge.    Diagnosis: Diabetes mellitus  Assessment and Plan of Treatment: You have a history of diabetes. While hospitalized, we checked your blood sugar frequently, and gave you insulin. When discharged, you will restart your home diabetes medications.    Diagnosis: CAD (coronary artery disease)  Assessment and Plan of Treatment: You had evidence of coronary artery disease as you presented with chest pain and shortness of breath. Cardiac catheterization revealed nonobstructive coronary artery disease, which was managed as above. Please follow up with Dr. aCmp.    Diagnosis: Heart failure with reduced ejection fraction  Assessment and Plan of Treatment: You have a history of heart failure. Your condition was monitored closely during your hospitalization. You were given diuretics. Please continue to take your medications as prescribed, and follow up with Dr. Camp within 1 week of discharge.    Diagnosis: Essential hypertension  Assessment and Plan of Treatment: You have a history of hypertension. Your condition was monitored closely during your hospitalization. You were given medications to control your blood pressure. Please continue to take your medications as prescribed, and follow up with Dr. Camp within 1 week of discharge.

## 2019-05-02 NOTE — PROGRESS NOTE ADULT - PROBLEM SELECTOR PLAN 4
-c/w sliding scale insulin   -f/u A1C
Initially was admitted for unstable angina, however ACS ruled out with cath showing no significant coronary disease.  -c/w ASA, Plavix, Lipitor 80  -holding beta blockade in setting of bradycardia
Pt with significant jump in white count post-code (possibly reactive), fever to 100.6, R sided infiltrate on CXR. Most likely CAP.    - continue Ceftriaxone for 5 doses total (4/27 - 5/1)  - f/u blood cultures
Pt with significant jump in white count post-code (possibly reactive), fever to 100.6, R sided infiltrate on CXR. Most likely CAP.    - continue Ceftriaxone for 5 doses total (4/27 - 5/1)  - f/u blood cultures
Pt with significant jump in white count post-code (possibly reactive), fever to 100.6, R sided infiltrate on CXR. Most likely CAP.    - last day of Ceftriaxone for 5 doses total (4/27 - 5/1)  - f/u blood cultures - no growth to date
Pt with significant jump in white count post-code (possibly reactive), fever to 100.6, R sided infiltrate on CXR. Most likely CAP.    - last day of Ceftriaxone for 5 doses total (4/27 - 5/1)  - f/u blood cultures - no growth to date
-c/w sliding scale insulin for now  -f/u A1C

## 2019-05-02 NOTE — DISCHARGE NOTE PROVIDER - CARE PROVIDERS DIRECT ADDRESSES
[email or web address]

## 2019-05-06 ENCOUNTER — APPOINTMENT (OUTPATIENT)
Dept: HEART AND VASCULAR | Facility: CLINIC | Age: 79
End: 2019-05-06
Payer: MEDICARE

## 2019-05-06 VITALS
HEART RATE: 66 BPM | BODY MASS INDEX: 23.78 KG/M2 | DIASTOLIC BLOOD PRESSURE: 59 MMHG | SYSTOLIC BLOOD PRESSURE: 115 MMHG | WEIGHT: 148 LBS | HEIGHT: 66 IN

## 2019-05-06 DIAGNOSIS — Z82.49 FAMILY HISTORY OF ISCHEMIC HEART DISEASE AND OTHER DISEASES OF THE CIRCULATORY SYSTEM: ICD-10-CM

## 2019-05-06 DIAGNOSIS — I47.2 VENTRICULAR TACHYCARDIA: ICD-10-CM

## 2019-05-06 PROCEDURE — 99214 OFFICE O/P EST MOD 30 MIN: CPT | Mod: 25

## 2019-05-06 PROCEDURE — 93282 PRGRMG EVAL IMPLANTABLE DFB: CPT

## 2019-05-06 RX ORDER — METFORMIN HYDROCHLORIDE 625 MG/1
TABLET ORAL
Refills: 0 | Status: COMPLETED | COMMUNITY

## 2019-05-06 RX ORDER — GLIPIZIDE AND METFORMIN HYDROCHLORIDE 5; 500 MG/1; MG/1
5-500 TABLET, FILM COATED ORAL
Qty: 60 | Refills: 0 | Status: COMPLETED | COMMUNITY
Start: 2019-04-02

## 2019-05-06 RX ORDER — ASPIRIN 325 MG
81 TABLET ORAL
Qty: 30 | Refills: 0 | Status: COMPLETED | COMMUNITY
Start: 2019-04-02

## 2019-05-06 RX ORDER — HYALURONATE SODIUM 10 MG/ML
20 VIAL (ML) INTRAARTICULAR
Qty: 12 | Refills: 0 | Status: DISCONTINUED | COMMUNITY
Start: 2017-11-17 | End: 2019-05-06

## 2019-05-06 RX ORDER — AMLODIPINE BESYLATE 10 MG/1
10 TABLET ORAL
Qty: 90 | Refills: 0 | Status: COMPLETED | COMMUNITY
Start: 2018-12-31

## 2019-05-06 RX ORDER — HYALURONATE SODIUM 10 MG/ML
20 VIAL (ML) INTRAARTICULAR
Qty: 6 | Refills: 0 | Status: DISCONTINUED | COMMUNITY
Start: 2019-01-17 | End: 2019-05-06

## 2019-05-06 RX ORDER — GLIMEPIRIDE 4 MG/1
TABLET ORAL
Refills: 0 | Status: COMPLETED | COMMUNITY

## 2019-05-08 NOTE — DISCUSSION/SUMMARY
[FreeTextEntry1] : Ms. Contreras is a pleasant 79 year-old female with a history of sustained VT s/p ICD placement 5/1/19.  She had recurrent VT last night with appropriate ICD therapy (ATP and shock) that converted her to SR.  Advised to start Amiodarone for arrhythmia suppression.  We discussed the potential indication for a VT ablation if she has recurrent arrhythmia.  She was asked to return for follow-up in 3-4 weeks and call with any questions or concerns in the interim.

## 2019-05-08 NOTE — HISTORY OF PRESENT ILLNESS
[de-identified] : Ms. Contreras is a pleasant 79 year-old female with a past medical history significant for HLD, HTN, NIDDM-II, R breast CA s/p lumpectomy and chemo (in remission), CAD s/p PCI x 3 (last 2008 @ [facility]) and symptomatic sustained VT.  Cardiac cath showed patent stents, minimal nonobstructive CAD.  She is s/p ICD placement 5/1/19 for secondary prevention of SCD.  She presents today with complaint of an ICD shock while laying in bed last night.  She denies CP, palpitations, dizziness, presyncope/syncope that preceded the shock and no symptoms thereafter.  No incisional complaints.\par

## 2019-05-08 NOTE — PHYSICAL EXAM
[Clean] : clean [Left Infraclavicular] : left infraclavicular area [Dry] : dry [Healing Well] : healing well

## 2019-05-08 NOTE — PROCEDURE
[No] : not [NSR] : normal sinus rhythm [ICD] : Implantable cardioverter-defibrillator [VVI] : VVI [Normal] : The battery status is normal. [Sensing Amplitude ___mv] : sensing amplitude was [unfilled] mv [Lead Imp:  ___ohms] : lead impedance was [unfilled] ohms [___V @] : [unfilled] V [___ ms] : [unfilled] ms [None] : none [de-identified] : [facility] [de-identified] : 5/1/19 [de-identified] : [facility] [de-identified] : 40 [de-identified] : 1 VT event - EGM c/w spontaneous VT with acceleration to cycle length ~ 330 ms. failed ATP x4. Converted to SR with 41 J shock

## 2019-05-09 ENCOUNTER — APPOINTMENT (OUTPATIENT)
Dept: HEART AND VASCULAR | Facility: CLINIC | Age: 79
End: 2019-05-09
Payer: MEDICARE

## 2019-05-09 ENCOUNTER — NON-APPOINTMENT (OUTPATIENT)
Age: 79
End: 2019-05-09

## 2019-05-09 VITALS
BODY MASS INDEX: 23.14 KG/M2 | SYSTOLIC BLOOD PRESSURE: 119 MMHG | HEIGHT: 66 IN | WEIGHT: 144 LBS | DIASTOLIC BLOOD PRESSURE: 60 MMHG | HEART RATE: 57 BPM

## 2019-05-09 PROCEDURE — 93282 PRGRMG EVAL IMPLANTABLE DFB: CPT

## 2019-05-13 ENCOUNTER — NON-APPOINTMENT (OUTPATIENT)
Age: 79
End: 2019-05-13

## 2019-05-14 DIAGNOSIS — Z85.3 PERSONAL HISTORY OF MALIGNANT NEOPLASM OF BREAST: ICD-10-CM

## 2019-05-14 DIAGNOSIS — I49.3 VENTRICULAR PREMATURE DEPOLARIZATION: ICD-10-CM

## 2019-05-14 DIAGNOSIS — I44.7 LEFT BUNDLE-BRANCH BLOCK, UNSPECIFIED: ICD-10-CM

## 2019-05-14 DIAGNOSIS — Z95.5 PRESENCE OF CORONARY ANGIOPLASTY IMPLANT AND GRAFT: ICD-10-CM

## 2019-05-14 DIAGNOSIS — A41.9 SEPSIS, UNSPECIFIED ORGANISM: ICD-10-CM

## 2019-05-14 DIAGNOSIS — E11.9 TYPE 2 DIABETES MELLITUS WITHOUT COMPLICATIONS: ICD-10-CM

## 2019-05-14 DIAGNOSIS — I42.9 CARDIOMYOPATHY, UNSPECIFIED: ICD-10-CM

## 2019-05-14 DIAGNOSIS — I25.110 ATHEROSCLEROTIC HEART DISEASE OF NATIVE CORONARY ARTERY WITH UNSTABLE ANGINA PECTORIS: ICD-10-CM

## 2019-05-14 DIAGNOSIS — Z90.710 ACQUIRED ABSENCE OF BOTH CERVIX AND UTERUS: ICD-10-CM

## 2019-05-14 DIAGNOSIS — Z79.82 LONG TERM (CURRENT) USE OF ASPIRIN: ICD-10-CM

## 2019-05-14 DIAGNOSIS — I50.20 UNSPECIFIED SYSTOLIC (CONGESTIVE) HEART FAILURE: ICD-10-CM

## 2019-05-14 DIAGNOSIS — Z82.49 FAMILY HISTORY OF ISCHEMIC HEART DISEASE AND OTHER DISEASES OF THE CIRCULATORY SYSTEM: ICD-10-CM

## 2019-05-14 DIAGNOSIS — I47.2 VENTRICULAR TACHYCARDIA: ICD-10-CM

## 2019-05-14 DIAGNOSIS — E78.5 HYPERLIPIDEMIA, UNSPECIFIED: ICD-10-CM

## 2019-05-14 DIAGNOSIS — Z92.21 PERSONAL HISTORY OF ANTINEOPLASTIC CHEMOTHERAPY: ICD-10-CM

## 2019-05-14 DIAGNOSIS — I20.0 UNSTABLE ANGINA: ICD-10-CM

## 2019-05-14 DIAGNOSIS — R65.21 SEVERE SEPSIS WITH SEPTIC SHOCK: ICD-10-CM

## 2019-05-14 DIAGNOSIS — I11.0 HYPERTENSIVE HEART DISEASE WITH HEART FAILURE: ICD-10-CM

## 2019-05-14 DIAGNOSIS — R57.0 CARDIOGENIC SHOCK: ICD-10-CM

## 2019-05-14 DIAGNOSIS — J96.90 RESPIRATORY FAILURE, UNSPECIFIED, UNSPECIFIED WHETHER WITH HYPOXIA OR HYPERCAPNIA: ICD-10-CM

## 2019-05-14 DIAGNOSIS — J98.11 ATELECTASIS: ICD-10-CM

## 2019-05-14 DIAGNOSIS — Z79.84 LONG TERM (CURRENT) USE OF ORAL HYPOGLYCEMIC DRUGS: ICD-10-CM

## 2019-05-14 DIAGNOSIS — J18.9 PNEUMONIA, UNSPECIFIED ORGANISM: ICD-10-CM

## 2019-05-14 DIAGNOSIS — Z78.1 PHYSICAL RESTRAINT STATUS: ICD-10-CM

## 2019-05-17 PROCEDURE — 93306 TTE W/DOPPLER COMPLETE: CPT

## 2019-05-17 PROCEDURE — 36415 COLL VENOUS BLD VENIPUNCTURE: CPT

## 2019-05-17 PROCEDURE — 71045 X-RAY EXAM CHEST 1 VIEW: CPT

## 2019-05-17 PROCEDURE — C1753: CPT

## 2019-05-17 PROCEDURE — 84300 ASSAY OF URINE SODIUM: CPT

## 2019-05-17 PROCEDURE — 75561 CARDIAC MRI FOR MORPH W/DYE: CPT

## 2019-05-17 PROCEDURE — 97161 PT EVAL LOW COMPLEX 20 MIN: CPT

## 2019-05-17 PROCEDURE — 84484 ASSAY OF TROPONIN QUANT: CPT

## 2019-05-17 PROCEDURE — 82803 BLOOD GASES ANY COMBINATION: CPT

## 2019-05-17 PROCEDURE — C1722: CPT

## 2019-05-17 PROCEDURE — 71046 X-RAY EXAM CHEST 2 VIEWS: CPT

## 2019-05-17 PROCEDURE — 83615 LACTATE (LD) (LDH) ENZYME: CPT

## 2019-05-17 PROCEDURE — 99285 EMERGENCY DEPT VISIT HI MDM: CPT

## 2019-05-17 PROCEDURE — 84540 ASSAY OF URINE/UREA-N: CPT

## 2019-05-17 PROCEDURE — 84100 ASSAY OF PHOSPHORUS: CPT

## 2019-05-17 PROCEDURE — 97116 GAIT TRAINING THERAPY: CPT

## 2019-05-17 PROCEDURE — 80053 COMPREHEN METABOLIC PANEL: CPT

## 2019-05-17 PROCEDURE — C1889: CPT

## 2019-05-17 PROCEDURE — 86225 DNA ANTIBODY NATIVE: CPT

## 2019-05-17 PROCEDURE — C1894: CPT

## 2019-05-17 PROCEDURE — 82550 ASSAY OF CK (CPK): CPT

## 2019-05-17 PROCEDURE — 85027 COMPLETE CBC AUTOMATED: CPT

## 2019-05-17 PROCEDURE — 85730 THROMBOPLASTIN TIME PARTIAL: CPT

## 2019-05-17 PROCEDURE — 94003 VENT MGMT INPAT SUBQ DAY: CPT

## 2019-05-17 PROCEDURE — 87581 M.PNEUMON DNA AMP PROBE: CPT

## 2019-05-17 PROCEDURE — 80074 ACUTE HEPATITIS PANEL: CPT

## 2019-05-17 PROCEDURE — C1769: CPT

## 2019-05-17 PROCEDURE — C1887: CPT

## 2019-05-17 PROCEDURE — 84295 ASSAY OF SERUM SODIUM: CPT

## 2019-05-17 PROCEDURE — 83880 ASSAY OF NATRIURETIC PEPTIDE: CPT

## 2019-05-17 PROCEDURE — 84156 ASSAY OF PROTEIN URINE: CPT

## 2019-05-17 PROCEDURE — 83935 ASSAY OF URINE OSMOLALITY: CPT

## 2019-05-17 PROCEDURE — 83605 ASSAY OF LACTIC ACID: CPT

## 2019-05-17 PROCEDURE — 87798 DETECT AGENT NOS DNA AMP: CPT

## 2019-05-17 PROCEDURE — 86431 RHEUMATOID FACTOR QUANT: CPT

## 2019-05-17 PROCEDURE — 94002 VENT MGMT INPAT INIT DAY: CPT

## 2019-05-17 PROCEDURE — 83735 ASSAY OF MAGNESIUM: CPT

## 2019-05-17 PROCEDURE — 84132 ASSAY OF SERUM POTASSIUM: CPT

## 2019-05-17 PROCEDURE — C1777: CPT

## 2019-05-17 PROCEDURE — 82553 CREATINE MB FRACTION: CPT

## 2019-05-17 PROCEDURE — 85610 PROTHROMBIN TIME: CPT

## 2019-05-17 PROCEDURE — 85652 RBC SED RATE AUTOMATED: CPT

## 2019-05-17 PROCEDURE — 93970 EXTREMITY STUDY: CPT

## 2019-05-17 PROCEDURE — 93005 ELECTROCARDIOGRAM TRACING: CPT

## 2019-05-17 PROCEDURE — 87449 NOS EACH ORGANISM AG IA: CPT

## 2019-05-17 PROCEDURE — 87040 BLOOD CULTURE FOR BACTERIA: CPT

## 2019-05-17 PROCEDURE — 85025 COMPLETE CBC W/AUTO DIFF WBC: CPT

## 2019-05-17 PROCEDURE — 83036 HEMOGLOBIN GLYCOSYLATED A1C: CPT

## 2019-05-17 PROCEDURE — 81001 URINALYSIS AUTO W/SCOPE: CPT

## 2019-05-17 PROCEDURE — 80048 BASIC METABOLIC PNL TOTAL CA: CPT

## 2019-05-17 PROCEDURE — 87486 CHLMYD PNEUM DNA AMP PROBE: CPT

## 2019-05-17 PROCEDURE — 82962 GLUCOSE BLOOD TEST: CPT

## 2019-05-17 PROCEDURE — 82570 ASSAY OF URINE CREATININE: CPT

## 2019-05-17 PROCEDURE — 87633 RESP VIRUS 12-25 TARGETS: CPT

## 2019-05-17 PROCEDURE — 82330 ASSAY OF CALCIUM: CPT

## 2019-05-17 PROCEDURE — A9577: CPT

## 2019-05-17 PROCEDURE — 83010 ASSAY OF HAPTOGLOBIN QUANT: CPT

## 2019-05-22 NOTE — DIETITIAN INITIAL EVALUATION ADULT. - PROBLEM/PLAN-1
Routine Office Visit    Patient Name: Ramon Carlos    : 1975  MRN: [id number]    Subjective:  Ramon is a 43 y.o. male who presents today for:    1. Sweats   Patient presenting today with almost one week of feeling fatigued, sweaty, having polyuria and frequency of urination.  He states he did have some flank pain that resolved.  No documented fevers.  There has been no rashes, joint pains, or insect bites.  He denies any history of prostatitis or being diagnosed with BPH.  No nocturia.  He is sleeping for hours at a time.  He continues to be clammy and have sweats.  No congestion, sinus pain, ear pain, or sore throat.  No edema ion the extremities.      Past Medical History  Past Medical History:   Diagnosis Date    Allergic rhinitis, seasonal     Calcaneus fracture     Family history of colon cancer     High-density lipoprotein deficiency     Incomplete right bundle branch block     Meningitis     Rotator cuff injury     Vitamin D deficiency disease        Past Surgical History  Past Surgical History:   Procedure Laterality Date    ADENOIDECTOMY      TONSILLECTOMY         Family History  Family History   Problem Relation Age of Onset    Cancer Mother         breast    Hyperlipidemia Father     Hypertension Father     Cancer Paternal Uncle         colon    Hyperlipidemia Maternal Grandmother     Hypertension Maternal Grandmother     Alcohol abuse Maternal Grandfather     Depression Paternal Grandmother     Cancer Paternal Grandfather         colon       Social History  Social History     Socioeconomic History    Marital status:      Spouse name: Not on file    Number of children: Not on file    Years of education: Not on file    Highest education level: Not on file   Occupational History    Not on file   Social Needs    Financial resource strain: Not on file    Food insecurity:     Worry: Not on file     Inability: Not on file    Transportation needs:     Medical:  Not on file     Non-medical: Not on file   Tobacco Use    Smoking status: Never Smoker    Smokeless tobacco: Never Used   Substance and Sexual Activity    Alcohol use: Yes     Alcohol/week: 0.0 oz     Comment: social    Drug use: No    Sexual activity: Not on file   Lifestyle    Physical activity:     Days per week: Not on file     Minutes per session: Not on file    Stress: Not on file   Relationships    Social connections:     Talks on phone: Not on file     Gets together: Not on file     Attends Hoahaoism service: Not on file     Active member of club or organization: Not on file     Attends meetings of clubs or organizations: Not on file     Relationship status: Not on file   Other Topics Concern    Not on file   Social History Narrative    Not on file       Current Medications  Current Outpatient Medications on File Prior to Visit   Medication Sig Dispense Refill    ascorbic acid (VITAMIN C) 100 MG tablet Take 1000 mg daily      cholecalciferol, vitamin D3, (VITAMIN D3) 2,000 unit Cap Take 1 capsule by mouth once daily.      fexofenadine (ALLEGRA) 180 MG tablet Take 180 mg by mouth once daily.        fish oil-omega-3 fatty acids 300-1,000 mg capsule Take 2 g by mouth once daily.      multivitamin capsule Take 1 capsule by mouth once daily.      ascorbic acid (VITAMIN C) 100 MG tablet Take 100 mg by mouth once daily.      azelastine (ASTELIN) 137 mcg (0.1 %) nasal spray 1 spray (137 mcg total) by Nasal route 2 (two) times daily. 30 mL 0     Current Facility-Administered Medications on File Prior to Visit   Medication Dose Route Frequency Provider Last Rate Last Dose    DIPH,PERTUSS(ACEL),TET VAC(PF)(ADULT)(ADACEL)(TDaP)  0.5 mL Intramuscular vaccine x 1 dose Bandar Michel MD           Allergies   Review of patient's allergies indicates:   Allergen Reactions    Codeine     Pcn [penicillins]     Sulfa (sulfonamide antibiotics)        Review of Systems (Pertinent positives)  Review of  "Systems   Constitutional: Positive for diaphoresis and malaise/fatigue. Negative for chills, fever and weight loss.   HENT: Negative for congestion, sinus pain and sore throat.    Eyes: Negative.    Respiratory: Negative for cough, hemoptysis, sputum production, shortness of breath, wheezing and stridor.    Cardiovascular: Negative.    Gastrointestinal: Negative.    Genitourinary: Positive for frequency and urgency. Negative for dysuria, flank pain and hematuria.   Musculoskeletal: Negative.    Skin: Negative for itching and rash.   Neurological: Negative.          /84 (BP Location: Left arm, Patient Position: Sitting, BP Method: Medium (Automatic))   Pulse 71   Temp 98.2 °F (36.8 °C) (Oral)   Resp 17   Ht 5' 10.98" (1.803 m)   Wt 79.7 kg (175 lb 11.3 oz)   SpO2 98%   BMI 24.52 kg/m²     GENERAL APPEARANCE: in no apparent distress and well developed and well nourished; appears clammy  HEENT: PERRL, EOMI, Sclera clear, anicteric, Oropharynx clear, no lesions, Neck supple with midline trachea  NECK: normal, supple, no adenopathy, thyroid normal in size  RESPIRATORY: appears well, vitals normal, no respiratory distress, acyanotic, normal RR, chest clear, no wheezing, crepitations, rhonchi, normal symmetric air entry  HEART: regular rate and rhythm, S1, S2 normal, no murmur, click, rub or gallop.    ABDOMEN: abdomen is soft without tenderness, no masses, no hernias, no organomegaly, no rebound, no guarding. Suprapubic tenderness absent. No CVA tenderness.  NEUROLOGIC: normal without focal findings, CN II-XII are intact.   SKIN: no rashes, no wounds, no other lesions  PSYCH: Alert, oriented x 3, thought content appropriate, speech normal, pleasant and cooperative, good eye contact, well groomed    Assessment/Plan:  Ramon Carlos is a 43 y.o. male who presents today for :    Diagnoses and all orders for this visit:    Polyuria  -     CBC auto differential; Future  -     Comprehensive metabolic panel; " Future  -     Urinalysis; Future  -     Urine culture  -     Blood culture; Future  -     X-Ray Chest PA And Lateral; Future  -     PSA, Screening; Future  -     doxycycline (VIBRAMYCIN) 100 MG Cap; Take 1 capsule (100 mg total) by mouth every 12 (twelve) hours. for 10 days    Diaphoresis  -     CBC auto differential; Future  -     Comprehensive metabolic panel; Future  -     Urinalysis; Future  -     Urine culture  -     Blood culture; Future  -     X-Ray Chest PA And Lateral; Future  -     doxycycline (VIBRAMYCIN) 100 MG Cap; Take 1 capsule (100 mg total) by mouth every 12 (twelve) hours. for 10 days      1.  Labs to be done today  2.  There has been a mild cough, so will get cxr  3.  PSA in the event this could be prostatitis  4.  Possible complicated UTI  5.  Call with any concerns  6.  Follow up as needed      Kyler Melara MD     DISPLAY PLAN FREE TEXT

## 2019-06-03 ENCOUNTER — NON-APPOINTMENT (OUTPATIENT)
Age: 79
End: 2019-06-03

## 2019-06-03 ENCOUNTER — APPOINTMENT (OUTPATIENT)
Dept: HEART AND VASCULAR | Facility: CLINIC | Age: 79
End: 2019-06-03
Payer: MEDICARE

## 2019-06-03 ENCOUNTER — RX RENEWAL (OUTPATIENT)
Age: 79
End: 2019-06-03

## 2019-06-03 VITALS
WEIGHT: 140 LBS | HEIGHT: 66 IN | HEART RATE: 54 BPM | DIASTOLIC BLOOD PRESSURE: 68 MMHG | SYSTOLIC BLOOD PRESSURE: 135 MMHG | BODY MASS INDEX: 22.5 KG/M2

## 2019-06-03 PROCEDURE — 99024 POSTOP FOLLOW-UP VISIT: CPT

## 2019-06-03 PROCEDURE — 93282 PRGRMG EVAL IMPLANTABLE DFB: CPT

## 2019-06-05 NOTE — HISTORY OF PRESENT ILLNESS
[de-identified] : Ms. Contreras is a pleasant 79 year-old female with a past medical history significant for HLD, HTN, NIDDM-II, R breast CA s/p lumpectomy and chemo (in remission), CAD s/p PCI x 3 (last 2008 @ [facility]) and symptomatic sustained VT.  Cardiac cath showed patent stents, minimal nonobstructive CAD.  She is s/p ICD placement 5/1/19 for secondary prevention of SCD.  She had sustained VT s/p ICD shock 5/5/19.  Started on Amiodarone and presents for follow-up today.  She offers no acute complaints. She denies CP, palpitations, dizziness, presyncope/syncope.  No incisional complaints.\par

## 2019-06-05 NOTE — PROCEDURE
[No] : not [ICD] : Implantable cardioverter-defibrillator [NSR] : normal sinus rhythm [VVI] : VVI [Normal] : The battery status is normal. [Lead Imp:  ___ohms] : lead impedance was [unfilled] ohms [___V @] : [unfilled] V [Sensing Amplitude ___mv] : sensing amplitude was [unfilled] mv [___ ms] : [unfilled] ms [None] : none [de-identified] : [facility] [de-identified] : 5/1/19 [de-identified] : [facility] [de-identified] : 40 [de-identified] : 1 VT event - EGM c/w spontaneous VT with acceleration to cycle length ~ 330 ms. failed ATP x4. Converted to SR with 41 J shock

## 2019-06-05 NOTE — PHYSICAL EXAM
[Left Infraclavicular] : left infraclavicular area [Clean] : clean [Dry] : dry [Healing Well] : healing well

## 2019-06-05 NOTE — DISCUSSION/SUMMARY
[FreeTextEntry1] : Ms. Contreras is a pleasant 79 year-old female with a history of sustained VT s/p ICD placement 5/1/19 with sustained VT s/p ICD shock 5/4/19.  No further VT/VF since Amiodarone was initiated.  We discussed the potential indication for a VT ablation if she has recurrent arrhythmia.  Her Qtc is sligtly prolonged on ECG but acceptable.  She was asked to return for follow-up in 3-4 months and call with any questions or concerns in the interim.

## 2019-06-06 ENCOUNTER — RX RENEWAL (OUTPATIENT)
Age: 79
End: 2019-06-06

## 2019-06-24 NOTE — PROCEDURE
[No] : not [NSR] : normal sinus rhythm [VVI] : VVI [ICD] : Implantable cardioverter-defibrillator [Normal] : The battery status is normal. [Sensing Amplitude ___mv] : sensing amplitude was [unfilled] mv [___ ms] : [unfilled] ms [None] : none [Lead Imp:  ___ohms] : lead impedance was [unfilled] ohms [___V @] : [unfilled] V [de-identified] : [facility] [de-identified] : 5/1/19 [de-identified] : [facility] [de-identified] : 40 [de-identified] : 1 VT event - EGM c/w spontaneous VT with acceleration to cycle length ~ 330 ms. failed ATP x4. Converted to SR with 41 J shock

## 2019-06-24 NOTE — DISCUSSION/SUMMARY
[FreeTextEntry1] : Ms. Contreras is a pleasant 79 year-old female with a history of sustained VT s/p ICD placement 5/1/19 with sustained VT s/p ICD shock 5/4/19.  I have asked her to start amiodarone 400mg x 7 days followed by 200mg daily.  She will return in 1 week for an EKG to evaluate her QTc.

## 2019-06-24 NOTE — HISTORY OF PRESENT ILLNESS
[de-identified] : Ms. Contreras is a pleasant 79 year-old female with a past medical history significant for HLD, HTN, NIDDM-II, R breast CA s/p lumpectomy and chemo (in remission), CAD s/p PCI x 3 (last 2008 @ [facility]) and symptomatic sustained VT.  Cardiac cath showed patent stents, minimal nonobstructive CAD.  She is s/p ICD placement 5/1/19 for secondary prevention of SCD.  She had sustained VT s/p ICD shock 5/5/19.  She offers no acute complaints. She denies CP, palpitations, dizziness, presyncope/syncope.  No incisional complaints.\par

## 2019-06-24 NOTE — PHYSICAL EXAM
[Left Infraclavicular] : left infraclavicular area [Dry] : dry [Clean] : clean [Healing Well] : healing well

## 2019-07-08 ENCOUNTER — RX RENEWAL (OUTPATIENT)
Age: 79
End: 2019-07-08

## 2019-07-14 ENCOUNTER — RX RENEWAL (OUTPATIENT)
Age: 79
End: 2019-07-14

## 2019-09-05 ENCOUNTER — APPOINTMENT (OUTPATIENT)
Dept: PULMONOLOGY | Facility: CLINIC | Age: 79
End: 2019-09-05
Payer: MEDICARE

## 2019-09-05 ENCOUNTER — OTHER (OUTPATIENT)
Age: 79
End: 2019-09-05

## 2019-09-05 VITALS
BODY MASS INDEX: 25.07 KG/M2 | TEMPERATURE: 97.8 F | WEIGHT: 156 LBS | HEART RATE: 53 BPM | HEIGHT: 66 IN | DIASTOLIC BLOOD PRESSURE: 70 MMHG | RESPIRATION RATE: 12 BRPM | SYSTOLIC BLOOD PRESSURE: 110 MMHG | OXYGEN SATURATION: 98 %

## 2019-09-05 DIAGNOSIS — R05 COUGH: ICD-10-CM

## 2019-09-05 DIAGNOSIS — R09.81 NASAL CONGESTION: ICD-10-CM

## 2019-09-05 DIAGNOSIS — Z79.899 OTHER LONG TERM (CURRENT) DRUG THERAPY: ICD-10-CM

## 2019-09-05 DIAGNOSIS — Z23 ENCOUNTER FOR IMMUNIZATION: ICD-10-CM

## 2019-09-05 DIAGNOSIS — Z91.89 OTHER SPECIFIED PERSONAL RISK FACTORS, NOT ELSEWHERE CLASSIFIED: ICD-10-CM

## 2019-09-05 PROCEDURE — 99204 OFFICE O/P NEW MOD 45 MIN: CPT | Mod: 25

## 2019-09-05 PROCEDURE — 94727 GAS DIL/WSHOT DETER LNG VOL: CPT

## 2019-09-05 PROCEDURE — 94010 BREATHING CAPACITY TEST: CPT

## 2019-09-05 PROCEDURE — 36415 COLL VENOUS BLD VENIPUNCTURE: CPT

## 2019-09-05 PROCEDURE — 94729 DIFFUSING CAPACITY: CPT

## 2019-09-05 PROCEDURE — 90670 PCV13 VACCINE IM: CPT

## 2019-09-05 PROCEDURE — 94618 PULMONARY STRESS TESTING: CPT

## 2019-09-05 PROCEDURE — G0009: CPT

## 2019-09-05 PROCEDURE — 76604 US EXAM CHEST: CPT

## 2019-09-07 NOTE — REVIEW OF SYSTEMS
[Nasal Congestion] : nasal congestion [Postnasal Drip] : postnasal drip [Cough] : cough [Sputum] : sputum  [Dyspnea] : dyspnea [Hypertension] : ~T hypertension [Nocturia] : nocturia [Hay Fever] : hay fever [Arthralgias] : arthralgias [Dizziness] : dizziness [Diabetes] : diabetes mellitus [Snoring] : snoring [Negative] : Psychiatric [Fever] : no fever [Chills] : no chills [Poor Appetite] : normal appetite  [Chest Tightness] : no chest tightness [Hemoptysis] : no hemoptysis [Wheezing] : no wheezing [Rash] : no [unfilled] rash [Anemia] : no anemia [Blood Transfusion] : no blood transfusion [Clotting Disorder] : no clotting disorder [Depression] : no depression [Anxiety] : no anxiety [FreeTextEntry2] : watery eyes [FreeTextEntry9] : s/p ICD, s/p 3 stents [FreeTextEntry5] : h/o breast cancer s/p lumpectomy [de-identified] : lightheaded when her blood glucose is low

## 2019-09-07 NOTE — HISTORY OF PRESENT ILLNESS
[FreeTextEntry1] : Pt is a 78 yo F with PMH typhoid as a child,  HLD, HTN, NIDDM-II, R breast CA s/p lumpectomy and chemo (~2001, in remission), CAD s/p PCI x 3 (last 2008 @ [facility]) and symptomatic sustained VT s/p ICD placement and two ICD shocks in May 2019, seasonal allergies, osteoarthritis.\par Never smoker. \par Referred by Dr. Sesay for evaluation. \par \par She has been on amiodarone since May 2019. She was initially started on 400 mg BID x 10 days. She is currently on 200 mg once daily.\par \par She has had a dry cough x years associated with itchy throat but has worsened since ICD placement. She has allergies and takes PRN Zyrtec. Does not like nasal sprays. May occasionally bring up clear mucus. Denies hemoptysis. Speaking on the phone may set off the cough. \par \par Can climb about 1/2 flight of stairs before she needs to stop to rest; she has problems going up the stair due to knee pain. Can walk 6 blocks before she feels winded. 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 \par Denies frequent respiratory tract infections, h/o asthma, h/o pneumonias.\par \par Social history: never smoker, domestic worker, no EtOH, no illicit drug use\par

## 2019-09-07 NOTE — DISCUSSION/SUMMARY
[FreeTextEntry1] : ATTENDING SUMMARY\par 9-5-19\par -No pallor, no icterus\par -MP 3, no oral thrush, no exudates, dentures in the upper jaw, partial dentures on lower jaw\par -Significant hypertrophy of middle turbinates, significant congestion\par -No JVD at 45 degree position, no hepatojugular reflux\par -Articular manifestations of osteoarthritis visible in both hands, R>L\par -No Raynaud's\par -No visible skin lesion\par -No dullness to percussion\par -Good air entry B/L, no wheezing, rhonchi, or crackles, no adventitious sounds\par -2/6 systolic murmur in aortic area radiating to LSB, no murmurs appreciated at apex of heart, P2 not loud or split\par -Trace non-pitting edema\par \par Ultrasound of the lungs was performed in the office.      There was no significant fluid or B lines detected.\par \par Spirometry demonstrated mild obstructive lung defect, mild restrictive lung defect, mild decrease in DLCO. \par Patient walked 293 meters on 6 MWT.   This represents a reduced walk distance with no desaturation.

## 2019-09-07 NOTE — PROCEDURE
[FreeTextEntry1] : PFT and 6MWT 19:\par FVC: 1.66 L (61%)\par FEV1: 1.13 L (59%)\par FEV1/FVC: 68%\par CXZ95-02%: 0.67 L/s (45%)\par T.02 L (78%)\par RV/T%\par DLCO: 13.6 (71%)\par 6MWT: 293 meters, SpO2 start: 98% --> SpO2 end: 96%\par Mild obstructive lung defect. Mild restrictive lung defect. Mildly decreased diffusion capacity. Reduced walk distance with no desaturation. \par

## 2019-09-07 NOTE — END OF VISIT
[FreeTextEntry3] : All medical record entries made by the Scribe were at my, Dr. Colby Gaston's direction and personally dictated by me.   I have reviewed the chart and agree that the record accurately reflects my personal performance of the history, physical exam, assessment and plan. I have also personally directed, reviewed, and agreed with the chart.

## 2019-09-07 NOTE — ASSESSMENT
[FreeTextEntry1] : 9/5/19\par It was a pleasure to meet Joyce in consultation today.  Her respiratory issues are:\par \par 1. Amiodarone toxicity\par Clinically no evidence of amiodarone toxicity. The likelihood of Joyce developing toxicity is low because:\par a. she is on low-doses of amiodarone (she only took amiodarone 400 for 10 days; she is currently on 200 mg once daily); most of the patients who have developed amiodarone toxicity received  higher doses of amiodarone (400 mg) over a longer period of time\par b. there is no history of increasing SOB, fever, weight loss, night sweats or chest pain, all of which are features of amiodarone toxicity\par c. auscultation of the lungs is clear on physical exam today\par Pulmonary issues that can develop with amiodarone are many, including interstitial pneumonias, organizing pneumonia, ARDS, nodules, lung masses, and pleural effusions. My index of suspicion for these is very low. \par \par However, we will get a CXR at this stage because she is c/o increasing cough since the amiodarone was started. We don’t have a CXR from after the amiodarone was started (last in the record is from 5/2/19). We will also get a full PFT including DLCO and 6MWT today. Her diffusion capacity is mildly reduced at 71% of predicted. Her 6MWT walk distance is reduced at 293 meters (ambulates with a cane) but she did not desaturate.\par Should she develop any new or worsening respiratory symptoms, she was requested to let us know and we will see her sooner.\par \par 2. Pleural effusion\par There is blunting of the costophrenic angles on her CXR from 05/2019. We will repeat CXR today. Also, on ultrasound exam in the office today, there was no significant pleural effusion observed or B-lines detected.\par \par 3. At risk for sleep apnea\par Joyce has a Mallampati score of 3 and a history of snoring. She does feel tired around 4pm. With a history of arrhythmia, it is important to rule out sleep apnea. We will order an attended sleep study. \par \par 4. Prominent pulmonary artery on CXR\par The pulmonary artery is prominent on her 05/2019 CXR. The PASP was estimated at 30 mmHg on echo on 4/267/19. \par \par 5. Hypertrophy of middle turbinates\par There is inflammation of the nasal mucosa and hypertrophy of the middle turbinates B/L. We have requested that Joyce use a saline sinus rinse BID and also use fluticasone nasal spray 2 sprays each nostril BID. \par \par 6. Health maintenance\par Prevnar was administered in the office today. We reminded Joyce to get her influenza vaccination as soon as it becomes available. \par \par Return to clinic: 3 months

## 2019-09-07 NOTE — PHYSICAL EXAM
[General Appearance - Well Developed] : well developed [Normal Appearance] : normal appearance [Well Groomed] : well groomed [General Appearance - Well Nourished] : well nourished [General Appearance - In No Acute Distress] : no acute distress [Skin Color & Pigmentation] : normal skin color and pigmentation [No Focal Deficits] : no focal deficits [Oriented To Time, Place, And Person] : oriented to person, place, and time [Impaired Insight] : insight and judgment were intact [Affect] : the affect was normal [Memory Recent] : recent memory was not impaired [Normal Conjunctiva] : the conjunctiva exhibited no abnormalities [Neck Appearance] : the appearance of the neck was normal [Jugular Venous Distention Increased] : there was no jugular-venous distention [Heart Rate And Rhythm] : heart rate and rhythm were normal [Heart Sounds] : normal S1 and S2 [] : no respiratory distress [Respiration, Rhythm And Depth] : normal respiratory rhythm and effort [Lungs Percussion] : the lungs were normal to percussion [Auscultation Breath Sounds / Voice Sounds] : lungs were clear to auscultation bilaterally [Abnormal Walk] : normal gait [Gait - Sufficient For Exercise Testing] : the gait was sufficient for exercise testing [FreeTextEntry1] : Articular manifestations of osteoarthritis visible in both hands, R>L; no Raynaud's [FreeTextEntry2] : Trace non-pitting edema

## 2019-09-08 LAB
ALBUMIN SERPL ELPH-MCNC: 4.7 G/DL
ALP BLD-CCNC: 46 U/L
ALT SERPL-CCNC: 13 U/L
ANION GAP SERPL CALC-SCNC: 15 MMOL/L
AST SERPL-CCNC: 16 U/L
BASOPHILS # BLD AUTO: 0.04 K/UL
BASOPHILS NFR BLD AUTO: 0.7 %
BILIRUB SERPL-MCNC: 0.5 MG/DL
BUN SERPL-MCNC: 20 MG/DL
CALCIUM SERPL-MCNC: 10.2 MG/DL
CHLORIDE SERPL-SCNC: 99 MMOL/L
CO2 SERPL-SCNC: 25 MMOL/L
CREAT SERPL-MCNC: 0.82 MG/DL
CRP SERPL-MCNC: <0.1 MG/DL
EOSINOPHIL # BLD AUTO: 0.23 K/UL
EOSINOPHIL NFR BLD AUTO: 3.7 %
ERYTHROCYTE [SEDIMENTATION RATE] IN BLOOD BY WESTERGREN METHOD: 16 MM/HR
GLUCOSE SERPL-MCNC: 162 MG/DL
HCT VFR BLD CALC: 40.9 %
HGB BLD-MCNC: 12.7 G/DL
IMM GRANULOCYTES NFR BLD AUTO: 0.3 %
LYMPHOCYTES # BLD AUTO: 2.84 K/UL
LYMPHOCYTES NFR BLD AUTO: 46.3 %
MAN DIFF?: NORMAL
MCHC RBC-ENTMCNC: 31.1 GM/DL
MCHC RBC-ENTMCNC: 31.4 PG
MCV RBC AUTO: 101 FL
MONOCYTES # BLD AUTO: 0.61 K/UL
MONOCYTES NFR BLD AUTO: 9.9 %
NEUTROPHILS # BLD AUTO: 2.4 K/UL
NEUTROPHILS NFR BLD AUTO: 39.1 %
PLATELET # BLD AUTO: 193 K/UL
POTASSIUM SERPL-SCNC: 4.3 MMOL/L
PROT SERPL-MCNC: 7.5 G/DL
RBC # BLD: 4.05 M/UL
RBC # FLD: 15.8 %
SODIUM SERPL-SCNC: 139 MMOL/L
T4 FREE SERPL-MCNC: 2 NG/DL
TSH SERPL-ACNC: 2.25 UIU/ML
WBC # FLD AUTO: 6.14 K/UL

## 2019-09-09 ENCOUNTER — APPOINTMENT (OUTPATIENT)
Dept: HEART AND VASCULAR | Facility: CLINIC | Age: 79
End: 2019-09-09
Payer: MEDICARE

## 2019-09-09 ENCOUNTER — NON-APPOINTMENT (OUTPATIENT)
Age: 79
End: 2019-09-09

## 2019-09-09 VITALS
WEIGHT: 153 LBS | SYSTOLIC BLOOD PRESSURE: 118 MMHG | BODY MASS INDEX: 24.59 KG/M2 | DIASTOLIC BLOOD PRESSURE: 76 MMHG | HEIGHT: 66 IN

## 2019-09-09 VITALS — HEART RATE: 54 BPM

## 2019-09-09 PROCEDURE — 99213 OFFICE O/P EST LOW 20 MIN: CPT | Mod: 25

## 2019-09-09 PROCEDURE — 93282 PRGRMG EVAL IMPLANTABLE DFB: CPT

## 2019-09-09 RX ORDER — AMIODARONE HYDROCHLORIDE 200 MG/1
200 TABLET ORAL
Qty: 90 | Refills: 1 | Status: DISCONTINUED | COMMUNITY
Start: 2019-05-06 | End: 2019-09-09

## 2019-09-09 RX ORDER — AMIODARONE HYDROCHLORIDE 400 MG/1
400 TABLET ORAL TWICE DAILY
Qty: 20 | Refills: 0 | Status: DISCONTINUED | COMMUNITY
Start: 2019-05-06 | End: 2019-09-09

## 2019-09-10 ENCOUNTER — RESULT REVIEW (OUTPATIENT)
Age: 79
End: 2019-09-10

## 2019-09-11 ENCOUNTER — APPOINTMENT (OUTPATIENT)
Dept: SLEEP CENTER | Facility: HOSPITAL | Age: 79
End: 2019-09-11

## 2019-09-11 ENCOUNTER — OUTPATIENT (OUTPATIENT)
Dept: OUTPATIENT SERVICES | Facility: HOSPITAL | Age: 79
LOS: 1 days | End: 2019-09-11
Payer: MEDICARE

## 2019-09-11 DIAGNOSIS — G47.33 OBSTRUCTIVE SLEEP APNEA (ADULT) (PEDIATRIC): ICD-10-CM

## 2019-09-11 PROCEDURE — 95810 POLYSOM 6/> YRS 4/> PARAM: CPT | Mod: 26

## 2019-09-11 PROCEDURE — 95810 POLYSOM 6/> YRS 4/> PARAM: CPT

## 2019-09-12 NOTE — HISTORY OF PRESENT ILLNESS
[de-identified] : Ms. Contreras is a pleasant 79 year-old female with a past medical history significant for HLD, HTN, NIDDM-II, R breast CA s/p lumpectomy and chemo (in remission), CAD s/p PCI x 3 (last 2008 @ [facility]) and symptomatic sustained VT.  Cardiac cath showed patent stents, minimal nonobstructive CAD.  She is s/p ICD placement 5/1/19 for secondary prevention of SCD.  She had sustained VT s/p ICD shock 5/5/19.  Started on Amiodarone and presents for follow-up today.  She offers no acute complaints. She denies CP, palpitations, dizziness, presyncope/syncope.   \par \par TFTs, LFTs in good range 9/2019\par

## 2019-09-12 NOTE — PROCEDURE
[No] : not [NSR] : normal sinus rhythm [ICD] : Implantable cardioverter-defibrillator [VVI] : VVI [Normal] : The battery status is normal. [Lead Imp:  ___ohms] : lead impedance was [unfilled] ohms [Sensing Amplitude ___mv] : sensing amplitude was [unfilled] mv [___V @] : [unfilled] V [___ ms] : [unfilled] ms [None] : none [de-identified] : [facility] [de-identified] : RESONATE [de-identified] : 5/1/19 [de-identified] : 40 [de-identified] : No new events

## 2019-09-23 ENCOUNTER — CLINICAL ADVICE (OUTPATIENT)
Age: 79
End: 2019-09-23

## 2019-09-23 DIAGNOSIS — M17.0 BILATERAL PRIMARY OSTEOARTHRITIS OF KNEE: ICD-10-CM

## 2019-09-25 ENCOUNTER — RESULT REVIEW (OUTPATIENT)
Age: 79
End: 2019-09-25

## 2019-09-27 ENCOUNTER — OTHER (OUTPATIENT)
Age: 79
End: 2019-09-27

## 2020-01-24 NOTE — ED PROVIDER NOTE - PRO INTERPRETER NEED 2
Patient called to cx his appt on 2/21/20 because he did not authorize us scheduling another appt & he states he was told that he does not need to be seen back & that it was only on an as need basis  I let the patient know that I canceled the appt  Patient then stated that he doesn't need people scheduling appts for him which then he misses because he doesn't know about them & he gets in trouble for them 
English

## 2020-03-09 ENCOUNTER — APPOINTMENT (OUTPATIENT)
Dept: HEART AND VASCULAR | Facility: CLINIC | Age: 80
End: 2020-03-09
Payer: MEDICARE

## 2020-03-09 VITALS
HEART RATE: 73 BPM | SYSTOLIC BLOOD PRESSURE: 116 MMHG | DIASTOLIC BLOOD PRESSURE: 62 MMHG | HEIGHT: 66 IN | WEIGHT: 153 LBS | BODY MASS INDEX: 24.59 KG/M2

## 2020-03-09 PROCEDURE — 99213 OFFICE O/P EST LOW 20 MIN: CPT | Mod: 25

## 2020-03-09 PROCEDURE — 93282 PRGRMG EVAL IMPLANTABLE DFB: CPT

## 2020-03-10 NOTE — PROCEDURE
[No] : not [NSR] : normal sinus rhythm [ICD] : Implantable cardioverter-defibrillator [VVI] : VVI [Normal] : The battery status is normal. [Lead Imp:  ___ohms] : lead impedance was [unfilled] ohms [Sensing Amplitude ___mv] : sensing amplitude was [unfilled] mv [___V @] : [unfilled] V [___ ms] : [unfilled] ms [None] : none [de-identified] : [facility] [de-identified] : RESONATE [de-identified] : 5/1/19 [de-identified] : 40 [de-identified] : 15 years to [facility]  [de-identified] : No new events

## 2020-03-10 NOTE — DISCUSSION/SUMMARY
[FreeTextEntry1] : Ms. Contreras is a pleasant 79 year-old female with a history of sustained VT s/p ICD placement 5/1/19 with sustained VT s/p ICD shock 5/4/19.  No further VT/VF since Amiodarone was initiated.  We discussed the potential indication for a VT ablation if she has recurrent arrhythmia.  Advised to reduce Amiodarone 100 mg daily as arrhythmia has been well suppressed.  Discussed need for monitoring for Amio toxicities in great detail.  Labs ordered today (LFTs, TFTs).  She was asked to return for follow-up in 6 months and call with any questions or concerns in the interim.

## 2020-03-10 NOTE — HISTORY OF PRESENT ILLNESS
[de-identified] : Ms. Contreras is a pleasant 79 year-old female with a past medical history significant for HLD, HTN, NIDDM-II, R breast CA s/p lumpectomy and chemo (in remission), CAD s/p PCI x 3 (last 2008 @ [facility]) and symptomatic sustained VT.  Cardiac cath showed patent stents, minimal nonobstructive CAD.  She is s/p ICD placement 5/1/19 for secondary prevention of SCD.  She had sustained VT s/p ICD shock 5/5/19.  Started on Amiodarone and presents for follow-up today.  She reports a longstanding cough that is unchanged recently; sees Dr. Gaston for pulmonary management. She denies CP, palpitations, dizziness, presyncope/syncope.   \par \par TFTs, LFTs in good range 9/2019

## 2020-11-10 ENCOUNTER — NON-APPOINTMENT (OUTPATIENT)
Age: 80
End: 2020-11-10

## 2021-03-08 ENCOUNTER — APPOINTMENT (OUTPATIENT)
Dept: HEART AND VASCULAR | Facility: CLINIC | Age: 81
End: 2021-03-08
Payer: MEDICARE

## 2021-03-08 ENCOUNTER — NON-APPOINTMENT (OUTPATIENT)
Age: 81
End: 2021-03-08

## 2021-03-08 PROCEDURE — 93297 REM INTERROG DEV EVAL ICPMS: CPT

## 2021-03-08 PROCEDURE — G2066: CPT

## 2021-03-10 ENCOUNTER — NON-APPOINTMENT (OUTPATIENT)
Age: 81
End: 2021-03-10

## 2021-03-10 ENCOUNTER — APPOINTMENT (OUTPATIENT)
Dept: HEART AND VASCULAR | Facility: CLINIC | Age: 81
End: 2021-03-10
Payer: MEDICARE

## 2021-03-10 PROCEDURE — 93296 REM INTERROG EVL PM/IDS: CPT

## 2021-03-10 PROCEDURE — 93295 DEV INTERROG REMOTE 1/2/MLT: CPT

## 2021-04-14 ENCOUNTER — APPOINTMENT (OUTPATIENT)
Dept: HEART AND VASCULAR | Facility: CLINIC | Age: 81
End: 2021-04-14
Payer: MEDICARE

## 2021-04-14 ENCOUNTER — NON-APPOINTMENT (OUTPATIENT)
Age: 81
End: 2021-04-14

## 2021-04-14 PROCEDURE — 93297 REM INTERROG DEV EVAL ICPMS: CPT

## 2021-04-14 PROCEDURE — G2066: CPT

## 2021-05-19 ENCOUNTER — NON-APPOINTMENT (OUTPATIENT)
Age: 81
End: 2021-05-19

## 2021-05-19 ENCOUNTER — APPOINTMENT (OUTPATIENT)
Dept: HEART AND VASCULAR | Facility: CLINIC | Age: 81
End: 2021-05-19
Payer: MEDICARE

## 2021-05-19 PROCEDURE — G2066: CPT

## 2021-05-19 PROCEDURE — 93297 REM INTERROG DEV EVAL ICPMS: CPT

## 2021-06-09 ENCOUNTER — APPOINTMENT (OUTPATIENT)
Dept: HEART AND VASCULAR | Facility: CLINIC | Age: 81
End: 2021-06-09
Payer: MEDICARE

## 2021-06-09 ENCOUNTER — NON-APPOINTMENT (OUTPATIENT)
Age: 81
End: 2021-06-09

## 2021-06-09 PROCEDURE — 93296 REM INTERROG EVL PM/IDS: CPT

## 2021-06-09 PROCEDURE — 93295 DEV INTERROG REMOTE 1/2/MLT: CPT

## 2021-06-23 ENCOUNTER — NON-APPOINTMENT (OUTPATIENT)
Age: 81
End: 2021-06-23

## 2021-06-23 ENCOUNTER — APPOINTMENT (OUTPATIENT)
Dept: HEART AND VASCULAR | Facility: CLINIC | Age: 81
End: 2021-06-23
Payer: MEDICARE

## 2021-06-23 PROCEDURE — G2066: CPT

## 2021-06-23 PROCEDURE — 93297 REM INTERROG DEV EVAL ICPMS: CPT

## 2021-07-01 ENCOUNTER — APPOINTMENT (OUTPATIENT)
Dept: ORTHOPEDIC SURGERY | Facility: CLINIC | Age: 81
End: 2021-07-01
Payer: MEDICARE

## 2021-07-13 ENCOUNTER — OUTPATIENT (OUTPATIENT)
Dept: OUTPATIENT SERVICES | Facility: HOSPITAL | Age: 81
LOS: 1 days | End: 2021-07-13
Payer: MEDICARE

## 2021-07-13 ENCOUNTER — RESULT REVIEW (OUTPATIENT)
Age: 81
End: 2021-07-13

## 2021-07-13 ENCOUNTER — APPOINTMENT (OUTPATIENT)
Dept: ORTHOPEDIC SURGERY | Facility: CLINIC | Age: 81
End: 2021-07-13
Payer: MEDICARE

## 2021-07-13 VITALS
HEIGHT: 66 IN | BODY MASS INDEX: 26.03 KG/M2 | TEMPERATURE: 98.6 F | WEIGHT: 162 LBS | SYSTOLIC BLOOD PRESSURE: 143 MMHG | OXYGEN SATURATION: 95 % | DIASTOLIC BLOOD PRESSURE: 71 MMHG | HEART RATE: 67 BPM

## 2021-07-13 DIAGNOSIS — Z86.79 PERSONAL HISTORY OF OTHER DISEASES OF THE CIRCULATORY SYSTEM: ICD-10-CM

## 2021-07-13 DIAGNOSIS — M17.12 UNILATERAL PRIMARY OSTEOARTHRITIS, LEFT KNEE: ICD-10-CM

## 2021-07-13 DIAGNOSIS — Z86.39 PERSONAL HISTORY OF OTHER ENDOCRINE, NUTRITIONAL AND METABOLIC DISEASE: ICD-10-CM

## 2021-07-13 DIAGNOSIS — Z87.39 PERSONAL HISTORY OF OTHER DISEASES OF THE MUSCULOSKELETAL SYSTEM AND CONNECTIVE TISSUE: ICD-10-CM

## 2021-07-13 DIAGNOSIS — E13.65 OTHER SPECIFIED DIABETES MELLITUS WITH HYPERGLYCEMIA: ICD-10-CM

## 2021-07-13 PROCEDURE — 73564 X-RAY EXAM KNEE 4 OR MORE: CPT

## 2021-07-13 PROCEDURE — 99212 OFFICE O/P EST SF 10 MIN: CPT | Mod: 25

## 2021-07-13 PROCEDURE — 20610 DRAIN/INJ JOINT/BURSA W/O US: CPT | Mod: RT

## 2021-07-13 PROCEDURE — 73564 X-RAY EXAM KNEE 4 OR MORE: CPT | Mod: 26,LT,RT

## 2021-07-13 RX ORDER — ATORVASTATIN CALCIUM 40 MG/1
40 TABLET, FILM COATED ORAL
Qty: 30 | Refills: 0 | Status: COMPLETED | COMMUNITY
Start: 2019-04-02 | End: 2021-07-13

## 2021-07-13 RX ORDER — ASPIRIN 81 MG
81 TABLET, DELAYED RELEASE (ENTERIC COATED) ORAL
Refills: 0 | Status: COMPLETED | COMMUNITY
End: 2021-07-13

## 2021-07-13 RX ORDER — FLUTICASONE PROPIONATE 50 UG/1
50 SPRAY, METERED NASAL TWICE DAILY
Qty: 1 | Refills: 2 | Status: COMPLETED | COMMUNITY
Start: 2019-04-02 | End: 2021-07-13

## 2021-07-13 RX ORDER — SACUBITRIL AND VALSARTAN 24; 26 MG/1; MG/1
24-26 TABLET, FILM COATED ORAL
Qty: 60 | Refills: 0 | Status: COMPLETED | COMMUNITY
Start: 2019-05-02 | End: 2021-07-13

## 2021-07-13 RX ORDER — FUROSEMIDE 20 MG/1
20 TABLET ORAL
Refills: 0 | Status: COMPLETED | COMMUNITY
End: 2021-07-13

## 2021-07-13 NOTE — PHYSICAL EXAM
[de-identified] : General: Well-nourished, well-developed, alert, and in no acute distress.\par Head: Normocephalic.\par Eyes: Pupils equal round reactive to light and accommodation, extraocular muscles intact, normal sclera.\par Nose: No nasal discharge.\par Cardiac: Regular rate. Extremities are warm and well perfused. Distal pulses are symmetric bilaterally.\par Respiratory: No labored breathing.\par Extremities: Sensation is intact distally bilaterally.  Distal pulses are symmetric bilaterally\par Lymphatic: No regional lymphadenopathy, no lymphedema\par Neurologic: No focal deficits\par Skin: Normal skin color, texture, and turgor\par Psychiatric: Normal affect\par MSK: as noted above/below\par \par \par \par RIGHT KNEE:\par \par Inspection: no bruising, erythema\par Joint Effusion:TRACE\par ROM: Knee Flexion 100 , Knee Extension 0\par Palpation:MEDIAL JOINT LINE PAIN, PERIPATELLAR PAIN, No pain at patellar tendon, MFC/LFC, Medial/Lateral Tibial Plateau\par Leg Length Discrepancy:no\par Patella: no apprehension\par Distal Pulses: normal\par Lower Extremity Strength:normal, 5/5 \par Lower Extremity Reflexes:normal, 2+\par Lower Extremity Sensation: normal\par \par Special Tests:\par Duy:Negative \par Meghana: Negative\par Anterior Drawer:Negative\par Posterior Drawer:Negative \par Varus/Valgus:Negative, no instability\par \par LEFT KNEE:\par \par Inspection: no bruising, swelling, erythema\par Joint Effusion:no \par ROM: Knee Flexion 130 , Knee Extension 0\par Palpation:MEDIAL JOINT LINE PAIN, PERIPATELLAR PAIN, No pain at patellar tendon, MFC/LFC, Medial/Lateral Tibial Plateau\par Leg Length Discrepancy:no\par Patella: no apprehension\par Distal Pulses: normal\par Lower Extremity Strength:normal, 5/5 \par Lower Extremity Reflexes:normal, 2+\par Lower Extremity Sensation: normal\par \par Special Tests:\par Duy:Negative \par Meghana: Negative\par Anterior Drawer:Negative\par Posterior Drawer:Negative \par Varus/Valgus:Negative, no instability\par \par \par  [de-identified] : Xray Bilateral Knees - Multiple views were reviewed with the patient in detail.  There is no acute fracture or dislocation.  There is trace right joint effusion.  Tricompartmental bilateral osteoarthrosis, right medial compartment worse than left.  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  We will await formal radiology reading.\par \par \par

## 2021-07-13 NOTE — HISTORY OF PRESENT ILLNESS
[8] : a current pain level of 8/10 [de-identified] : The patient is a 80 year old woman presenting with bilateral knee osteoarthritis.\par \par Patient has known history of knee osteoarthritis, previously seen by Orthopedics. She has responded to hyaluronic acid injections in the past. Her symptoms have recurred. She is not seeking surgery at this time. She endorses pain and stiffness.   Pain worse on right compared to left.  She is here for right Gel-One injection.  The patient denies mechanical symptoms including catching, locking, buckling.\par \par Pain is rated 8/10, described as sharp, improved with ice, worse with walking.\par

## 2021-07-13 NOTE — REVIEW OF SYSTEMS
[Joint Pain] : joint pain [Joint Stiffness] : joint stiffness [Joint Swelling] : joint swelling [Recent Weight Gain (___ Lbs)] : recent ~M [unfilled] lbs weight gain [Urinary Frequency] : urinary frequency

## 2021-07-13 NOTE — DISCUSSION/SUMMARY
[Medication Risks Reviewed] : Medication risks reviewed [de-identified] : The patient is a 80 year old woman presenting with chronic bilateral knee pain, right worse than left secondary to  knee osteoarthritis.\par \par Imaging/Diagnostics/Medical Records were interpreted and/or reviewed and results were reviewed with the patient in detail.  All questions were answered appropriately.\par \par I discussed the treatment of degenerative arthritis with the patient at length today. I described the spectrum of treatment from nonoperative modalities to total joint arthroplasty. Noninvasive and nonoperative treatment modalities include weight reduction, activity modification with low impact exercise, PRN use of acetaminophen or anti-inflammatory medication if tolerated, natural supplements such as glucosamine/chondroitin, and physical therapy. Further treatments can include corticosteroid injection, hyaluronic acid injections, and orthobiologic such as PRP. Definitive treatment can certainly include total joint arthroplasty, but patient would require surgical consultation to discuss that option further. The risks and benefits of each treatment options was discussed and all questions were answered.\par \par \par Patient understands that they may require surgery in the future.\par \par After informed consent, and explanation of risks, benefits, alternatives, adverse effects of injection, which includes but is not limited to infection, bleeding, allergic reaction, swelling, soft tissue weakening/tendon rupture, injection site complication, fat atrophy, skin depigmentation, failure to improve symptoms, the patient would like to proceed with the procedure - RIGHT KNEE ULTRASOUND-GUIDED CORTICOSTEROID INJECTION. See procedure note above. Patient tolerated the procedure well. The patient was provided with postinjection instructions.\par \par May consider cortisone injections if diabetes under better control in the future.  May also consider PRP.\par \par ------------------------------------------------------------------------------------------------------------------\par Patient appreciates and agrees with current plan.\par \par The patient's diagnosis above was evaluated by me, personally.  Diagnostic Testing and treatment options were discussed with the patient in detail.  The risks/benefits/potential complications of diagnostic testing/treatments were described in detail.  \par \par This note was generated using a mixture of manual typing and dragon medical dictation software.  A reasonable effort has been made for proofreading its contents, but typos may still remain.  If there are any questions or points of clarification needed please notify my office.\par \par \par >15 minutes of time was spent in total for the encounter.  >50% of the time spent was on face-to-face counseling/coordination of care and medical-decision making for this patient.\par \par \par

## 2021-07-13 NOTE — PROCEDURE
[de-identified] : Ultrasound-Guided RIGHT Knee Injection\par \par Indication for U/S Guidance: Ensure placement within the tibiofemoral joint for diagnostic purposes, while avoiding neurovascular structures\par \par Indication for Injection: KNEE OSTEOARTHRITIS\par \par A discussion was had with the patient regarding this procedure and all questions were answered. All risks, benefits and alternatives were discussed. These included but were not limited to bleeding, infection, and allergic reaction. A timeout was done to ensure correct side and pt agreed to the procedure. Betadine was used to sterilize and prep the area, and alcohol was used to clean the skin in the anterior aspect of the knee joint. The suprapatellar space was visualized utilizing the Sonosite, linear transducer. The joint was visualized in the short axis and an in-plane approach was used for the injection. Ultrasound guidance was utilized to ensure accuracy of the intra-articular injection and avoid the neurovascular structures. A 22-gauge 1.5" needle was used to inject 2cc of 1% lidocaine without epi into the SubQ.  This was followed by injection with 3CC OF GEL-ONE.  A sterile bandage was then applied. The patient tolerated the procedure well and there were no complications.\par

## 2021-07-28 ENCOUNTER — APPOINTMENT (OUTPATIENT)
Dept: HEART AND VASCULAR | Facility: CLINIC | Age: 81
End: 2021-07-28
Payer: MEDICARE

## 2021-07-28 ENCOUNTER — NON-APPOINTMENT (OUTPATIENT)
Age: 81
End: 2021-07-28

## 2021-07-28 PROCEDURE — 93297 REM INTERROG DEV EVAL ICPMS: CPT

## 2021-07-28 PROCEDURE — G2066: CPT

## 2021-08-30 ENCOUNTER — RX RENEWAL (OUTPATIENT)
Age: 81
End: 2021-08-30

## 2021-09-01 ENCOUNTER — APPOINTMENT (OUTPATIENT)
Dept: HEART AND VASCULAR | Facility: CLINIC | Age: 81
End: 2021-09-01
Payer: MEDICARE

## 2021-09-01 ENCOUNTER — NON-APPOINTMENT (OUTPATIENT)
Age: 81
End: 2021-09-01

## 2021-09-01 PROCEDURE — 93297 REM INTERROG DEV EVAL ICPMS: CPT

## 2021-09-01 PROCEDURE — G2066: CPT

## 2021-09-08 ENCOUNTER — APPOINTMENT (OUTPATIENT)
Dept: HEART AND VASCULAR | Facility: CLINIC | Age: 81
End: 2021-09-08
Payer: MEDICARE

## 2021-09-08 ENCOUNTER — NON-APPOINTMENT (OUTPATIENT)
Age: 81
End: 2021-09-08

## 2021-09-08 PROCEDURE — 93296 REM INTERROG EVL PM/IDS: CPT

## 2021-09-08 PROCEDURE — 93295 DEV INTERROG REMOTE 1/2/MLT: CPT

## 2021-10-06 ENCOUNTER — NON-APPOINTMENT (OUTPATIENT)
Age: 81
End: 2021-10-06

## 2021-10-06 ENCOUNTER — APPOINTMENT (OUTPATIENT)
Dept: HEART AND VASCULAR | Facility: CLINIC | Age: 81
End: 2021-10-06
Payer: MEDICARE

## 2021-10-06 PROCEDURE — 93296 REM INTERROG EVL PM/IDS: CPT

## 2021-10-06 PROCEDURE — 93295 DEV INTERROG REMOTE 1/2/MLT: CPT

## 2021-11-10 ENCOUNTER — NON-APPOINTMENT (OUTPATIENT)
Age: 81
End: 2021-11-10

## 2021-11-10 ENCOUNTER — APPOINTMENT (OUTPATIENT)
Dept: HEART AND VASCULAR | Facility: CLINIC | Age: 81
End: 2021-11-10
Payer: MEDICARE

## 2021-11-10 PROCEDURE — G2066: CPT | Mod: NC

## 2021-11-10 PROCEDURE — 93297 REM INTERROG DEV EVAL ICPMS: CPT

## 2021-12-08 ENCOUNTER — NON-APPOINTMENT (OUTPATIENT)
Age: 81
End: 2021-12-08

## 2021-12-08 ENCOUNTER — APPOINTMENT (OUTPATIENT)
Dept: HEART AND VASCULAR | Facility: CLINIC | Age: 81
End: 2021-12-08
Payer: MEDICARE

## 2021-12-08 PROCEDURE — 93295 DEV INTERROG REMOTE 1/2/MLT: CPT | Mod: NC

## 2021-12-08 PROCEDURE — 93296 REM INTERROG EVL PM/IDS: CPT | Mod: NC

## 2022-01-12 ENCOUNTER — APPOINTMENT (OUTPATIENT)
Dept: HEART AND VASCULAR | Facility: CLINIC | Age: 82
End: 2022-01-12
Payer: MEDICARE

## 2022-01-12 ENCOUNTER — NON-APPOINTMENT (OUTPATIENT)
Age: 82
End: 2022-01-12

## 2022-01-12 PROCEDURE — 93297 REM INTERROG DEV EVAL ICPMS: CPT

## 2022-01-12 PROCEDURE — G2066: CPT

## 2022-02-16 ENCOUNTER — APPOINTMENT (OUTPATIENT)
Dept: HEART AND VASCULAR | Facility: CLINIC | Age: 82
End: 2022-02-16

## 2022-02-27 ENCOUNTER — FORM ENCOUNTER (OUTPATIENT)
Age: 82
End: 2022-02-27

## 2022-04-11 ENCOUNTER — APPOINTMENT (OUTPATIENT)
Dept: HEART AND VASCULAR | Facility: CLINIC | Age: 82
End: 2022-04-11
Payer: MEDICARE

## 2022-04-11 ENCOUNTER — NON-APPOINTMENT (OUTPATIENT)
Age: 82
End: 2022-04-11

## 2022-04-11 PROCEDURE — 93295 DEV INTERROG REMOTE 1/2/MLT: CPT

## 2022-04-11 PROCEDURE — 93296 REM INTERROG EVL PM/IDS: CPT

## 2022-04-25 ENCOUNTER — APPOINTMENT (OUTPATIENT)
Dept: HEART AND VASCULAR | Facility: CLINIC | Age: 82
End: 2022-04-25
Payer: MEDICARE

## 2022-04-25 ENCOUNTER — NON-APPOINTMENT (OUTPATIENT)
Age: 82
End: 2022-04-25

## 2022-04-25 PROCEDURE — 93297 REM INTERROG DEV EVAL ICPMS: CPT

## 2022-04-25 PROCEDURE — G2066: CPT

## 2022-06-01 ENCOUNTER — APPOINTMENT (OUTPATIENT)
Dept: HEART AND VASCULAR | Facility: CLINIC | Age: 82
End: 2022-06-01

## 2022-06-20 ENCOUNTER — NON-APPOINTMENT (OUTPATIENT)
Age: 82
End: 2022-06-20

## 2022-06-23 ENCOUNTER — APPOINTMENT (OUTPATIENT)
Dept: ORTHOPEDIC SURGERY | Facility: CLINIC | Age: 82
End: 2022-06-23
Payer: MEDICARE

## 2022-06-23 PROCEDURE — 20611 DRAIN/INJ JOINT/BURSA W/US: CPT

## 2022-06-23 PROCEDURE — 99212 OFFICE O/P EST SF 10 MIN: CPT | Mod: 25

## 2022-06-28 ENCOUNTER — APPOINTMENT (OUTPATIENT)
Dept: ORTHOPEDIC SURGERY | Facility: CLINIC | Age: 82
End: 2022-06-28

## 2022-06-28 PROCEDURE — 20611 DRAIN/INJ JOINT/BURSA W/US: CPT | Mod: RT

## 2022-06-28 NOTE — PHYSICAL EXAM
[de-identified] : General: Well-nourished, well-developed, alert, and in no acute distress.\par Head: Normocephalic.\par Eyes: Pupils equal round reactive to light and accommodation, extraocular muscles intact, normal sclera.\par Nose: No nasal discharge.\par Cardiac: Regular rate. Extremities are warm and well perfused. Distal pulses are symmetric bilaterally.\par Respiratory: No labored breathing.\par Extremities: Sensation is intact distally bilaterally.  Distal pulses are symmetric bilaterally\par Lymphatic: No regional lymphadenopathy, no lymphedema\par Neurologic: No focal deficits\par Skin: Normal skin color, texture, and turgor\par Psychiatric: Normal affect\par MSK: as noted above/below\par \par \par \par RIGHT KNEE:\par \par Inspection: no bruising, erythema\par Joint Effusion:TRACE\par ROM: Knee Flexion 100 , Knee Extension 0\par Palpation:MEDIAL JOINT LINE PAIN, PERIPATELLAR PAIN, No pain at patellar tendon, MFC/LFC, Medial/Lateral Tibial Plateau\par Leg Length Discrepancy:no\par Patella: no apprehension\par Distal Pulses: normal\par Lower Extremity Strength:normal, 5/5 \par Lower Extremity Reflexes:normal, 2+\par Lower Extremity Sensation: normal\par \par Special Tests:\par Duy:Negative \par Meghana: Negative\par Anterior Drawer:Negative\par Posterior Drawer:Negative \par Varus/Valgus:Negative, no instability\par \par LEFT KNEE:\par \par Inspection: no bruising, swelling, erythema\par Joint Effusion:no \par ROM: Knee Flexion 130 , Knee Extension 0\par Palpation:MEDIAL JOINT LINE PAIN, PERIPATELLAR PAIN, No pain at patellar tendon, MFC/LFC, Medial/Lateral Tibial Plateau\par Leg Length Discrepancy:no\par Patella: no apprehension\par Distal Pulses: normal\par Lower Extremity Strength:normal, 5/5 \par Lower Extremity Reflexes:normal, 2+\par Lower Extremity Sensation: normal\par \par Special Tests:\par Duy:Negative \par Meghana: Negative\par Anterior Drawer:Negative\par Posterior Drawer:Negative \par Varus/Valgus:Negative, no instability\par \par \par

## 2022-06-28 NOTE — DISCUSSION/SUMMARY
[Medication Risks Reviewed] : Medication risks reviewed [de-identified] : The patient is a 80 year old woman presenting with chronic bilateral knee pain, right worse than left secondary to  knee osteoarthritis.\par \par Imaging/Diagnostics/Medical Records were interpreted and/or reviewed and results were reviewed with the patient in detail.  All questions were answered appropriately.\par \par I discussed the treatment of degenerative arthritis with the patient at length today. I described the spectrum of treatment from nonoperative modalities to total joint arthroplasty. Noninvasive and nonoperative treatment modalities include weight reduction, activity modification with low impact exercise, PRN use of acetaminophen or anti-inflammatory medication if tolerated, natural supplements such as glucosamine/chondroitin, and physical therapy. Further treatments can include corticosteroid injection, hyaluronic acid injections, and orthobiologic such as PRP. Definitive treatment can certainly include total joint arthroplasty, but patient would require surgical consultation to discuss that option further. The risks and benefits of each treatment options was discussed and all questions were answered.\par \par \par Patient understands that they may require surgery in the future.\par \par After informed consent, and explanation of risks, benefits, alternatives, adverse effects of injection, which includes but is not limited to infection, bleeding, allergic reaction, swelling, soft tissue weakening/tendon rupture, injection site complication, fat atrophy, skin depigmentation, failure to improve symptoms, the patient would like to proceed with the procedure - RIGHT KNEE ULTRASOUND-GUIDED ORTHOVISC INJECTION #1 See procedure note above. Patient tolerated the procedure well. The patient was provided with postinjection instructions.\par \par Follow-up in 1 weeks for injection #2.\par \par ------------------------------------------------------------------------------------------------------------------\par Patient appreciates and agrees with current plan.\par \par The patient's diagnosis above was evaluated by me, personally.  Diagnostic Testing and treatment options were discussed with the patient in detail.  The risks/benefits/potential complications of diagnostic testing/treatments were described in detail.  \par \par This note was generated using a mixture of manual typing and dragon medical dictation software.  A reasonable effort has been made for proofreading its contents, but typos may still remain.  If there are any questions or points of clarification needed please notify my office.\par \par \par >15 minutes of time was spent in total for the encounter.  >50% of the time spent was on face-to-face counseling/coordination of care and medical-decision making for this patient.\par \par \par

## 2022-06-28 NOTE — HISTORY OF PRESENT ILLNESS
[de-identified] : The patient is a 80 year old woman presenting with right knee osteoarthritis.\par \par Patient has known history of knee osteoarthritis, previously seen by Orthopedics. She has responded to hyaluronic acid injections in the past. Her symptoms have recurred. She is not seeking surgery at this time. She endorses pain and stiffness.   Pain worse on right compared to left.  She has responded to HA injections in the past. The patient denies mechanical symptoms including catching, locking, buckling.\par \par Pain is rated 8/10, described as sharp, improved with ice, worse with walking.\par

## 2022-06-28 NOTE — PROCEDURE
[de-identified] : Ultrasound-Guided RIGHT Knee Injection\par \par Indication for U/S Guidance: Ensure placement within the tibiofemoral joint for diagnostic purposes, while avoiding neurovascular structures\par \par Indication for Injection: KNEE OSTEOARTHRITIS\par \par A discussion was had with the patient regarding this procedure and all questions were answered. All risks, benefits and alternatives were discussed. These included but were not limited to bleeding, infection, injection site reaction/complication and allergic reaction. A timeout was done to ensure correct side and pt agreed to the procedure. Betadine was used to sterilize and prep the area, and alcohol was used to clean the skin in the anterior aspect of the knee joint. The suprapatellar space was visualized utilizing the Sonosite, linear transducer. The joint was visualized in the short axis and an in-plane approach was used for the injection. Ultrasound guidance was utilized to ensure accuracy of the intra-articular injection and avoid the neurovascular structures. A 22-gauge 1.5" needle was used to inject 2cc of 1% lidocaine without epi into the SubQ.  This was followed by injection with 2cc of ORTHOVISC injection #2.  A sterile bandage was then applied. The patient tolerated the procedure well and there were no complications.\par \par LOT: [id number]\par Exp: 2023-01-31

## 2022-06-28 NOTE — PROCEDURE
[de-identified] : Ultrasound-Guided RIGHT Knee Injection\par \par Indication for U/S Guidance: Ensure placement within the tibiofemoral joint for diagnostic purposes, while avoiding neurovascular structures\par \par Indication for Injection: KNEE OSTEOARTHRITIS\par \par A discussion was had with the patient regarding this procedure and all questions were answered. All risks, benefits and alternatives were discussed. These included but were not limited to bleeding, infection, injection site reaction/complication and allergic reaction. A timeout was done to ensure correct side and pt agreed to the procedure. Betadine was used to sterilize and prep the area, and alcohol was used to clean the skin in the anterior aspect of the knee joint. The suprapatellar space was visualized utilizing the Sonosite, linear transducer. The joint was visualized in the short axis and an in-plane approach was used for the injection. Ultrasound guidance was utilized to ensure accuracy of the intra-articular injection and avoid the neurovascular structures. A 22-gauge 1.5" needle was used to inject 2cc of 1% lidocaine without epi into the SubQ.  This was followed by injection with 2cc of ORTHOVISC injection #1.  A sterile bandage was then applied. The patient tolerated the procedure well and there were no complications.\par \par LOT: [id number]\par Exp: 2023-01-31

## 2022-07-07 ENCOUNTER — APPOINTMENT (OUTPATIENT)
Dept: ORTHOPEDIC SURGERY | Facility: CLINIC | Age: 82
End: 2022-07-07

## 2022-07-07 VITALS
BODY MASS INDEX: 26.03 KG/M2 | WEIGHT: 162 LBS | OXYGEN SATURATION: 96 % | SYSTOLIC BLOOD PRESSURE: 134 MMHG | HEART RATE: 64 BPM | DIASTOLIC BLOOD PRESSURE: 78 MMHG | HEIGHT: 66 IN | TEMPERATURE: 98.6 F

## 2022-07-07 DIAGNOSIS — M17.11 UNILATERAL PRIMARY OSTEOARTHRITIS, RIGHT KNEE: ICD-10-CM

## 2022-07-07 PROCEDURE — 20611 DRAIN/INJ JOINT/BURSA W/US: CPT

## 2022-07-07 NOTE — PROCEDURE
[de-identified] : Ultrasound-Guided RIGHT Knee Injection\par \par Indication for U/S Guidance: Ensure placement within the tibiofemoral joint for diagnostic purposes, while avoiding neurovascular structures\par \par Indication for Injection: KNEE OSTEOARTHRITIS\par \par A discussion was had with the patient regarding this procedure and all questions were answered. All risks, benefits and alternatives were discussed. These included but were not limited to bleeding, infection, injection site reaction/complication and allergic reaction. A timeout was done to ensure correct side and pt agreed to the procedure. Betadine was used to sterilize and prep the area, and alcohol was used to clean the skin in the anterior aspect of the knee joint. The suprapatellar space was visualized utilizing the Sonosite, linear transducer. The joint was visualized in the short axis and an in-plane approach was used for the injection. Ultrasound guidance was utilized to ensure accuracy of the intra-articular injection and avoid the neurovascular structures. A 22-gauge 1.5" needle was used to inject 2cc of 1% lidocaine without epi into the SubQ.  This was followed by injection with 2cc of ORTHOVISC injection #3.  A sterile bandage was then applied. The patient tolerated the procedure well and there were no complications.\par \par LOT: [id number]\par Exp: 2023-01-31\par NDC: [id number]

## 2022-07-13 ENCOUNTER — APPOINTMENT (OUTPATIENT)
Dept: HEART AND VASCULAR | Facility: CLINIC | Age: 82
End: 2022-07-13

## 2022-07-18 ENCOUNTER — FORM ENCOUNTER (OUTPATIENT)
Age: 82
End: 2022-07-18

## 2022-11-16 ENCOUNTER — APPOINTMENT (OUTPATIENT)
Dept: HEART AND VASCULAR | Facility: CLINIC | Age: 82
End: 2022-11-16

## 2022-11-17 ENCOUNTER — FORM ENCOUNTER (OUTPATIENT)
Age: 82
End: 2022-11-17

## 2022-12-21 ENCOUNTER — APPOINTMENT (OUTPATIENT)
Dept: HEART AND VASCULAR | Facility: CLINIC | Age: 82
End: 2022-12-21

## 2022-12-22 ENCOUNTER — FORM ENCOUNTER (OUTPATIENT)
Age: 82
End: 2022-12-22

## 2023-01-04 ENCOUNTER — NON-APPOINTMENT (OUTPATIENT)
Age: 83
End: 2023-01-04

## 2023-03-02 NOTE — PATIENT PROFILE ADULT - NSPROHMCARDIOMGMTSTRAT_GEN_A_NUR
High Dose Vitamin A Pregnancy And Lactation Text: High dose vitamin A therapy is contraindicated during pregnancy and breast feeding. Aklief Pregnancy And Lactation Text: It is unknown if this medication is safe to use during pregnancy.  It is unknown if this medication is excreted in breast milk.  Breastfeeding women should use the topical cream on the smallest area of the skin for the shortest time needed while breastfeeding.  Do not apply to nipple and areola. Doxycycline Counseling:  Patient counseled regarding possible photosensitivity and increased risk for sunburn.  Patient instructed to avoid sunlight, if possible.  When exposed to sunlight, patients should wear protective clothing, sunglasses, and sunscreen.  The patient was instructed to call the office immediately if the following severe adverse effects occur:  hearing changes, easy bruising/bleeding, severe headache, or vision changes.  The patient verbalized understanding of the proper use and possible adverse effects of doxycycline.  All of the patient's questions and concerns were addressed. Erythromycin Pregnancy And Lactation Text: This medication is Pregnancy Category B and is considered safe during pregnancy. It is also excreted in breast milk. Benzoyl Peroxide Counseling: Patient counseled that medicine may cause skin irritation and bleach clothing.  In the event of skin irritation, the patient was advised to reduce the amount of the drug applied or use it less frequently.   The patient verbalized understanding of the proper use and possible adverse effects of benzoyl peroxide.  All of the patient's questions and concerns were addressed. Sarecycline Pregnancy And Lactation Text: This medication is Pregnancy Category D and not consider safe during pregnancy. It is also excreted in breast milk. Azelaic Acid Counseling: Patient counseled that medicine may cause skin irritation and to avoid applying near the eyes.  In the event of skin irritation, the patient was advised to reduce the amount of the drug applied or use it less frequently.   The patient verbalized understanding of the proper use and possible adverse effects of azelaic acid.  All of the patient's questions and concerns were addressed. Tazorac Pregnancy And Lactation Text: This medication is not safe during pregnancy. It is unknown if this medication is excreted in breast milk. Benzoyl Peroxide Pregnancy And Lactation Text: This medication is Pregnancy Category C. It is unknown if benzoyl peroxide is excreted in breast milk. Bactrim Counseling:  I discussed with the patient the risks of sulfa antibiotics including but not limited to GI upset, allergic reaction, drug rash, diarrhea, dizziness, photosensitivity, and yeast infections.  Rarely, more serious reactions can occur including but not limited to aplastic anemia, agranulocytosis, methemoglobinemia, blood dyscrasias, liver or kidney failure, lung infiltrates or desquamative/blistering drug rashes. medication therapy/diet modification Topical Sulfur Applications Counseling: Topical Sulfur Counseling: Patient counseled that this medication may cause skin irritation or allergic reactions.  In the event of skin irritation, the patient was advised to reduce the amount of the drug applied or use it less frequently.   The patient verbalized understanding of the proper use and possible adverse effects of topical sulfur application.  All of the patient's questions and concerns were addressed. Isotretinoin Counseling: Patient should get monthly blood tests, not donate blood, not drive at night if vision affected, not share medication, and not undergo elective surgery for 6 months after tx completed. Side effects reviewed, pt to contact office should one occur. Isotretinoin Pregnancy And Lactation Text: This medication is Pregnancy Category X and is considered extremely dangerous during pregnancy. It is unknown if it is excreted in breast milk. Topical Retinoid counseling:  Patient advised to apply a pea-sized amount only at bedtime and wait 30 minutes after washing their face before applying.  If too drying, patient may add a non-comedogenic moisturizer. The patient verbalized understanding of the proper use and possible adverse effects of retinoids.  All of the patient's questions and concerns were addressed. Spironolactone Counseling: Patient advised regarding risks of diarrhea, abdominal pain, hyperkalemia, birth defects (for female patients), liver toxicity and renal toxicity. The patient may need blood work to monitor liver and kidney function and potassium levels while on therapy. The patient verbalized understanding of the proper use and possible adverse effects of spironolactone.  All of the patient's questions and concerns were addressed. Topical Sulfur Applications Pregnancy And Lactation Text: This medication is Pregnancy Category C and has an unknown safety profile during pregnancy. It is unknown if this topical medication is excreted in breast milk. Birth Control Pills Counseling: Birth Control Pill Counseling: I discussed with the patient the potential side effects of OCPs including but not limited to increased risk of stroke, heart attack, thrombophlebitis, deep venous thrombosis, hepatic adenomas, breast changes, GI upset, headaches, and depression.  The patient verbalized understanding of the proper use and possible adverse effects of OCPs. All of the patient's questions and concerns were addressed. Birth Control Pills Pregnancy And Lactation Text: This medication should be avoided if pregnant and for the first 30 days post-partum. Azelaic Acid Pregnancy And Lactation Text: This medication is considered safe during pregnancy and breast feeding. Aklief counseling:  Patient advised to apply a pea-sized amount only at bedtime and wait 30 minutes after washing their face before applying.  If too drying, patient may add a non-comedogenic moisturizer.  The most commonly reported side effects including irritation, redness, scaling, dryness, stinging, burning, itching, and increased risk of sunburn.  The patient verbalized understanding of the proper use and possible adverse effects of retinoids.  All of the patient's questions and concerns were addressed. Winlevi Pregnancy And Lactation Text: This medication is considered safe during pregnancy and breastfeeding. Erythromycin Counseling:  I discussed with the patient the risks of erythromycin including but not limited to GI upset, allergic reaction, drug rash, diarrhea, increase in liver enzymes, and yeast infections. Winlevi Counseling:  I discussed with the patient the risks of topical clascoterone including but not limited to erythema, scaling, itching, and stinging. Patient voiced their understanding. Include Pregnancy/Lactation Warning?: No Topical Retinoid Pregnancy And Lactation Text: This medication is Pregnancy Category C. It is unknown if this medication is excreted in breast milk. Topical Clindamycin Pregnancy And Lactation Text: This medication is Pregnancy Category B and is considered safe during pregnancy. It is unknown if it is excreted in breast milk. Dapsone Counseling: I discussed with the patient the risks of dapsone including but not limited to hemolytic anemia, agranulocytosis, rashes, methemoglobinemia, kidney failure, peripheral neuropathy, headaches, GI upset, and liver toxicity.  Patients who start dapsone require monitoring including baseline LFTs and weekly CBCs for the first month, then every month thereafter.  The patient verbalized understanding of the proper use and possible adverse effects of dapsone.  All of the patient's questions and concerns were addressed. Azithromycin Pregnancy And Lactation Text: This medication is considered safe during pregnancy and is also secreted in breast milk. Doxycycline Pregnancy And Lactation Text: This medication is Pregnancy Category D and not consider safe during pregnancy. It is also excreted in breast milk but is considered safe for shorter treatment courses. Topical Clindamycin Counseling: Patient counseled that this medication may cause skin irritation or allergic reactions.  In the event of skin irritation, the patient was advised to reduce the amount of the drug applied or use it less frequently.   The patient verbalized understanding of the proper use and possible adverse effects of clindamycin.  All of the patient's questions and concerns were addressed. Dapsone Pregnancy And Lactation Text: This medication is Pregnancy Category C and is not considered safe during pregnancy or breast feeding. Spironolactone Pregnancy And Lactation Text: This medication can cause feminization of the male fetus and should be avoided during pregnancy. The active metabolite is also found in breast milk. High Dose Vitamin A Counseling: Side effects reviewed, pt to contact office should one occur. Detail Level: Zone Minocycline Counseling: Patient advised regarding possible photosensitivity and discoloration of the teeth, skin, lips, tongue and gums.  Patient instructed to avoid sunlight, if possible.  When exposed to sunlight, patients should wear protective clothing, sunglasses, and sunscreen.  The patient was instructed to call the office immediately if the following severe adverse effects occur:  hearing changes, easy bruising/bleeding, severe headache, or vision changes.  The patient verbalized understanding of the proper use and possible adverse effects of minocycline.  All of the patient's questions and concerns were addressed. Bactrim Pregnancy And Lactation Text: This medication is Pregnancy Category D and is known to cause fetal risk.  It is also excreted in breast milk. Tazorac Counseling:  Patient advised that medication is irritating and drying.  Patient may need to apply sparingly and wash off after an hour before eventually leaving it on overnight.  The patient verbalized understanding of the proper use and possible adverse effects of tazorac.  All of the patient's questions and concerns were addressed. Tetracycline Counseling: Patient counseled regarding possible photosensitivity and increased risk for sunburn.  Patient instructed to avoid sunlight, if possible.  When exposed to sunlight, patients should wear protective clothing, sunglasses, and sunscreen.  The patient was instructed to call the office immediately if the following severe adverse effects occur:  hearing changes, easy bruising/bleeding, severe headache, or vision changes.  The patient verbalized understanding of the proper use and possible adverse effects of tetracycline.  All of the patient's questions and concerns were addressed. Patient understands to avoid pregnancy while on therapy due to potential birth defects. Sarecycline Counseling: Patient advised regarding possible photosensitivity and discoloration of the teeth, skin, lips, tongue and gums.  Patient instructed to avoid sunlight, if possible.  When exposed to sunlight, patients should wear protective clothing, sunglasses, and sunscreen.  The patient was instructed to call the office immediately if the following severe adverse effects occur:  hearing changes, easy bruising/bleeding, severe headache, or vision changes.  The patient verbalized understanding of the proper use and possible adverse effects of sarecycline.  All of the patient's questions and concerns were addressed. Azithromycin Counseling:  I discussed with the patient the risks of azithromycin including but not limited to GI upset, allergic reaction, drug rash, diarrhea, and yeast infections.

## 2023-05-01 NOTE — ED ADULT NURSE NOTE - CHPI ED NUR SYMPTOMS NEG
no chills/no body aches Klisyri Counseling:  I discussed with the patient the risks of Klisyri including but not limited to erythema, scaling, itching, weeping, crusting, and pain.

## 2023-08-31 NOTE — DIETITIAN INITIAL EVALUATION ADULT. - PHYSICAL APPEARANCE
BMI 23.6/well nourished Post-care instructions were reviewed in detail. A handout was provided. Patient is not to engage in any heavy lifting, exercise, or swimming for the next 14 days. Should the patient develop any fevers, chills, bleeding, severe pain patient will contact the office immediately.\\n\\nA dose of both Tylenol 1000mg and ibuprofen 400mg (at the same time) was recommended shortly after surgery, and every 6 hours as needed for pain. Pain not adequately relieved or severe pain should be reported to our office or the provider on call. Avoid any additional aspirin containing products. Cell phone numbers of the providers were handed out. \\n\\nIce packs should also be used post surgically and may be placed over the wound dressing to help with pain and swelling, and bleeding. The ice pack is placed over the wound for fifteen minutes and may be repeated four to six times per day for 2-3 days.

## 2023-09-24 ENCOUNTER — INPATIENT (INPATIENT)
Facility: HOSPITAL | Age: 83
LOS: 7 days | Discharge: HOME HEALTH | DRG: 233 | End: 2023-10-02
Attending: THORACIC SURGERY (CARDIOTHORACIC VASCULAR SURGERY) | Admitting: INTERNAL MEDICINE
Payer: MEDICARE

## 2023-09-24 VITALS
WEIGHT: 157.41 LBS | SYSTOLIC BLOOD PRESSURE: 122 MMHG | RESPIRATION RATE: 18 BRPM | HEIGHT: 66 IN | HEART RATE: 59 BPM | OXYGEN SATURATION: 95 % | TEMPERATURE: 97 F | DIASTOLIC BLOOD PRESSURE: 78 MMHG

## 2023-09-24 DIAGNOSIS — I21.4 NON-ST ELEVATION (NSTEMI) MYOCARDIAL INFARCTION: ICD-10-CM

## 2023-09-24 DIAGNOSIS — E11.9 TYPE 2 DIABETES MELLITUS WITHOUT COMPLICATIONS: ICD-10-CM

## 2023-09-24 DIAGNOSIS — I50.20 UNSPECIFIED SYSTOLIC (CONGESTIVE) HEART FAILURE: ICD-10-CM

## 2023-09-24 LAB
A1C WITH ESTIMATED AVERAGE GLUCOSE RESULT: 7.1 % — HIGH (ref 4–5.6)
ALBUMIN SERPL ELPH-MCNC: 3.7 G/DL — SIGNIFICANT CHANGE UP (ref 3.3–5)
ALP SERPL-CCNC: 59 U/L — SIGNIFICANT CHANGE UP (ref 40–120)
ALT FLD-CCNC: 33 U/L — SIGNIFICANT CHANGE UP (ref 10–45)
ANION GAP SERPL CALC-SCNC: 9 MMOL/L — SIGNIFICANT CHANGE UP (ref 5–17)
APTT BLD: 63.5 SEC — HIGH (ref 24.5–35.6)
AST SERPL-CCNC: 19 U/L — SIGNIFICANT CHANGE UP (ref 10–40)
BILIRUB SERPL-MCNC: 0.6 MG/DL — SIGNIFICANT CHANGE UP (ref 0.2–1.2)
BUN SERPL-MCNC: 13 MG/DL — SIGNIFICANT CHANGE UP (ref 7–23)
CALCIUM SERPL-MCNC: 9.5 MG/DL — SIGNIFICANT CHANGE UP (ref 8.4–10.5)
CHLORIDE SERPL-SCNC: 99 MMOL/L — SIGNIFICANT CHANGE UP (ref 96–108)
CO2 SERPL-SCNC: 28 MMOL/L — SIGNIFICANT CHANGE UP (ref 22–31)
CREAT SERPL-MCNC: 0.82 MG/DL — SIGNIFICANT CHANGE UP (ref 0.5–1.3)
EGFR: 71 ML/MIN/1.73M2 — SIGNIFICANT CHANGE UP
ESTIMATED AVERAGE GLUCOSE: 157 MG/DL — HIGH (ref 68–114)
GLUCOSE BLDC GLUCOMTR-MCNC: 159 MG/DL — HIGH (ref 70–99)
GLUCOSE BLDC GLUCOMTR-MCNC: 242 MG/DL — HIGH (ref 70–99)
GLUCOSE SERPL-MCNC: 169 MG/DL — HIGH (ref 70–99)
HCT VFR BLD CALC: 31.6 % — LOW (ref 34.5–45)
HGB BLD-MCNC: 10.7 G/DL — LOW (ref 11.5–15.5)
INR BLD: 1.13 — SIGNIFICANT CHANGE UP (ref 0.85–1.18)
MAGNESIUM SERPL-MCNC: 1.6 MG/DL — SIGNIFICANT CHANGE UP (ref 1.6–2.6)
MCHC RBC-ENTMCNC: 31.6 PG — SIGNIFICANT CHANGE UP (ref 27–34)
MCHC RBC-ENTMCNC: 33.9 GM/DL — SIGNIFICANT CHANGE UP (ref 32–36)
MCV RBC AUTO: 93.2 FL — SIGNIFICANT CHANGE UP (ref 80–100)
NRBC # BLD: 0 /100 WBCS — SIGNIFICANT CHANGE UP (ref 0–0)
NT-PROBNP SERPL-SCNC: 2513 PG/ML — HIGH (ref 0–300)
PLATELET # BLD AUTO: 184 K/UL — SIGNIFICANT CHANGE UP (ref 150–400)
POTASSIUM SERPL-MCNC: 4 MMOL/L — SIGNIFICANT CHANGE UP (ref 3.5–5.3)
POTASSIUM SERPL-SCNC: 4 MMOL/L — SIGNIFICANT CHANGE UP (ref 3.5–5.3)
PROT SERPL-MCNC: 6.5 G/DL — SIGNIFICANT CHANGE UP (ref 6–8.3)
PROTHROM AB SERPL-ACNC: 12.8 SEC — SIGNIFICANT CHANGE UP (ref 9.5–13)
RBC # BLD: 3.39 M/UL — LOW (ref 3.8–5.2)
RBC # FLD: 14 % — SIGNIFICANT CHANGE UP (ref 10.3–14.5)
SODIUM SERPL-SCNC: 136 MMOL/L — SIGNIFICANT CHANGE UP (ref 135–145)
TROPONIN T, HIGH SENSITIVITY RESULT: 40 NG/L — SIGNIFICANT CHANGE UP (ref 0–51)
WBC # BLD: 5.33 K/UL — SIGNIFICANT CHANGE UP (ref 3.8–10.5)
WBC # FLD AUTO: 5.33 K/UL — SIGNIFICANT CHANGE UP (ref 3.8–10.5)

## 2023-09-24 RX ORDER — DEXTROSE 50 % IN WATER 50 %
12.5 SYRINGE (ML) INTRAVENOUS ONCE
Refills: 0 | Status: DISCONTINUED | OUTPATIENT
Start: 2023-09-24 | End: 2023-09-27

## 2023-09-24 RX ORDER — SACUBITRIL AND VALSARTAN 24; 26 MG/1; MG/1
1 TABLET, FILM COATED ORAL
Refills: 0 | Status: DISCONTINUED | OUTPATIENT
Start: 2023-09-24 | End: 2023-09-27

## 2023-09-24 RX ORDER — DEXTROSE 50 % IN WATER 50 %
25 SYRINGE (ML) INTRAVENOUS ONCE
Refills: 0 | Status: DISCONTINUED | OUTPATIENT
Start: 2023-09-24 | End: 2023-09-27

## 2023-09-24 RX ORDER — ASPIRIN/CALCIUM CARB/MAGNESIUM 324 MG
81 TABLET ORAL DAILY
Refills: 0 | Status: DISCONTINUED | OUTPATIENT
Start: 2023-09-25 | End: 2023-09-27

## 2023-09-24 RX ORDER — METOPROLOL TARTRATE 50 MG
50 TABLET ORAL DAILY
Refills: 0 | Status: DISCONTINUED | OUTPATIENT
Start: 2023-09-25 | End: 2023-09-27

## 2023-09-24 RX ORDER — INSULIN LISPRO 100/ML
VIAL (ML) SUBCUTANEOUS AT BEDTIME
Refills: 0 | Status: DISCONTINUED | OUTPATIENT
Start: 2023-09-24 | End: 2023-09-27

## 2023-09-24 RX ORDER — FLUTICASONE PROPIONATE 50 MCG
1 SPRAY, SUSPENSION NASAL
Refills: 0 | Status: DISCONTINUED | OUTPATIENT
Start: 2023-09-24 | End: 2023-09-27

## 2023-09-24 RX ORDER — HEPARIN SODIUM 5000 [USP'U]/ML
4400 INJECTION INTRAVENOUS; SUBCUTANEOUS EVERY 6 HOURS
Refills: 0 | Status: DISCONTINUED | OUTPATIENT
Start: 2023-09-24 | End: 2023-09-27

## 2023-09-24 RX ORDER — ATORVASTATIN CALCIUM 80 MG/1
40 TABLET, FILM COATED ORAL AT BEDTIME
Refills: 0 | Status: DISCONTINUED | OUTPATIENT
Start: 2023-09-24 | End: 2023-09-27

## 2023-09-24 RX ORDER — DEXTROSE 50 % IN WATER 50 %
15 SYRINGE (ML) INTRAVENOUS ONCE
Refills: 0 | Status: DISCONTINUED | OUTPATIENT
Start: 2023-09-24 | End: 2023-09-27

## 2023-09-24 RX ORDER — CLOPIDOGREL BISULFATE 75 MG/1
75 TABLET, FILM COATED ORAL DAILY
Refills: 0 | Status: DISCONTINUED | OUTPATIENT
Start: 2023-09-25 | End: 2023-09-26

## 2023-09-24 RX ORDER — INFLUENZA VIRUS VACCINE 15; 15; 15; 15 UG/.5ML; UG/.5ML; UG/.5ML; UG/.5ML
0.7 SUSPENSION INTRAMUSCULAR ONCE
Refills: 0 | Status: DISCONTINUED | OUTPATIENT
Start: 2023-09-24 | End: 2023-09-27

## 2023-09-24 RX ORDER — HEPARIN SODIUM 5000 [USP'U]/ML
INJECTION INTRAVENOUS; SUBCUTANEOUS
Qty: 25000 | Refills: 0 | Status: DISCONTINUED | OUTPATIENT
Start: 2023-09-24 | End: 2023-09-27

## 2023-09-24 RX ORDER — INSULIN LISPRO 100/ML
VIAL (ML) SUBCUTANEOUS
Refills: 0 | Status: DISCONTINUED | OUTPATIENT
Start: 2023-09-24 | End: 2023-09-27

## 2023-09-24 RX ORDER — GLUCAGON INJECTION, SOLUTION 0.5 MG/.1ML
1 INJECTION, SOLUTION SUBCUTANEOUS ONCE
Refills: 0 | Status: DISCONTINUED | OUTPATIENT
Start: 2023-09-24 | End: 2023-09-27

## 2023-09-24 RX ORDER — SODIUM CHLORIDE 9 MG/ML
1000 INJECTION, SOLUTION INTRAVENOUS
Refills: 0 | Status: DISCONTINUED | OUTPATIENT
Start: 2023-09-24 | End: 2023-09-27

## 2023-09-24 RX ORDER — HEPARIN SODIUM 5000 [USP'U]/ML
4400 INJECTION INTRAVENOUS; SUBCUTANEOUS ONCE
Refills: 0 | Status: COMPLETED | OUTPATIENT
Start: 2023-09-24 | End: 2023-09-24

## 2023-09-24 RX ORDER — MAGNESIUM SULFATE 500 MG/ML
2 VIAL (ML) INJECTION ONCE
Refills: 0 | Status: COMPLETED | OUTPATIENT
Start: 2023-09-24 | End: 2023-09-24

## 2023-09-24 RX ORDER — SODIUM CHLORIDE 9 MG/ML
500 INJECTION INTRAMUSCULAR; INTRAVENOUS; SUBCUTANEOUS
Refills: 0 | Status: DISCONTINUED | OUTPATIENT
Start: 2023-09-24 | End: 2023-09-25

## 2023-09-24 RX ADMIN — HEPARIN SODIUM 900 UNIT(S)/HR: 5000 INJECTION INTRAVENOUS; SUBCUTANEOUS at 20:20

## 2023-09-24 RX ADMIN — HEPARIN SODIUM 900 UNIT(S)/HR: 5000 INJECTION INTRAVENOUS; SUBCUTANEOUS at 19:07

## 2023-09-24 RX ADMIN — SACUBITRIL AND VALSARTAN 1 TABLET(S): 24; 26 TABLET, FILM COATED ORAL at 19:13

## 2023-09-24 RX ADMIN — ATORVASTATIN CALCIUM 40 MILLIGRAM(S): 80 TABLET, FILM COATED ORAL at 22:40

## 2023-09-24 RX ADMIN — Medication 1: at 17:58

## 2023-09-24 RX ADMIN — HEPARIN SODIUM 4400 UNIT(S): 5000 INJECTION INTRAVENOUS; SUBCUTANEOUS at 19:09

## 2023-09-24 RX ADMIN — Medication 25 GRAM(S): at 19:08

## 2023-09-24 NOTE — H&P ADULT - NS ATTEND AMEND GEN_ALL_CORE FT
84yo F with PMHx HTN, HLD, MI (2005), DM, CAD (s/p ISAAC x 3 @ [facility] most recently 2008), VT arrest (intubation and subsequent ICD placement in 2019 by Dr. Sesay), breast cancer (s/p lumpectomy and chemo - in remission) who initially presented to [facility] on 09/21/23 after her ICD fired x 1 with associated CP and dizziness and elevated trop, r/i NSTEMI now s/p cardiac cath @  on 09/23/23 revealing LM and double vessel CAD and was subsequently transferred to [facility] on 09/24/23 for high risk cardiac cath with Dr. Wright. Pt is now s/p cardiac cath on 09/25/23 with severe stenosis LM, CTS consulted for evaluation for surgical revascularization.    1. CAD  Complex CAD s.p NSTEMI type 1, transferred from [place] to [place] to attempt high risk PCI, chest pain free and hemodynamically stable.    2. VT arrest  HR 60s, on BB, no evidence of recurrent VT, likely ischemic.    3. HFrEF  Acute on chronic compensated HFrEF 25%, ischemic dilated,  stage C AHA/ACC, NYHA class II, profile A. Continue entresto and metoprolol, add farxiga and aldactone after [facility] if Cr unchanged.

## 2023-09-24 NOTE — H&P ADULT - ASSESSMENT
83yoF PMHx HTN, HLD, MI (2005), DM, CAD (s/p ISAAC x 3 @ [facility] most recently 2008), VT arrest (intubation and subsequent ICD placement in 2019 by Dr. Sesay), Breast cancer (s/p lumpectomy and chemo, in remission) presented to [facility] on 09/21/23 after her ICD fired x 1 with associated CP and dizziness and elevated trop r/i NSTEMI s/p Cardiac Cath @  on 09/23/23 revealing LM and double vessel CAD and was subsequently transferred to [facility]. Patient was admitted to [facility] on 09/24/23 for continous monitoring for NSTEMI and cardiac cath on 09/25/23 of LM disease. 83yoF PMHx HTN, HLD, MI (2005), DM, CAD (s/p ISAAC x 3 @ [facility] most recently 2008), VT arrest (intubation and subsequent ICD placement in 2019 by Dr. Sesay), Breast cancer (s/p lumpectomy and chemo, in remission) presented to [facility] on 09/21/23 after her ICD fired x 1 with associated CP and dizziness and elevated trop r/i NSTEMI s/p Cardiac Cath @  on 09/23/23 revealing LM and double vessel CAD and was subsequently transferred to [facility]. Patient was admitted to [facility] on 09/24/23 for continous monitoring for NSTEMI and cardiac cath on 09/25/23 of LM disease.      EKG: 			  ASA: III 	  Mallampati class: II  Anginal Class: III    -No Known Allergies    -H/H = 10.7/31.6  . Pt denies BRBPR, hematuria, hematochezia, melena. Pt endorses compliance w/ home Aspirin 81 mg qd, stating last dose was 09/24/23. Patie  -BUN/Cr = 13/0.82  . EF 25%. Euvolemic on exam. IV NS @ 30 cc/hr x 8 hrs started pre procedure    Sedation Plan:   Moderate  Patient Is Suitable Candidate For Sedation?     Yes    Risks & benefits of procedure and alternative therapy have been explained to the patient including but not limited to: allergic reaction, bleeding with possible need for blood transfusion, infection, renal and vascular compromise, limb damage, arrhythmia, stroke, vessel dissection/perforation, myocardial infarction, and emergent CABG. Informed consent obtained at bedside and included in chart. 83yoF PMHx HTN, HLD, MI (2005), DM, CAD (s/p ISAAC x 3 @ [facility] most recently 2008), VT arrest (intubation and subsequent ICD placement in 2019 by Dr. Sesay), Breast cancer (s/p lumpectomy and chemo, in remission) presented to [facility] on 09/21/23 after her ICD fired x 1 with associated CP and dizziness and elevated trop r/i NSTEMI s/p Cardiac Cath @  on 09/23/23 revealing LM and double vessel CAD and was subsequently transferred to [facility]. Patient was admitted to [facility] on 09/24/23 for continous monitoring for NSTEMI and cardiac cath on 09/25/23 of LM disease.      EKG: 			  ASA: III 	  Mallampati class: II  Anginal Class: III    -No Known Allergies    -H/H = 10.7/31.6  . Pt denies BRBPR, hematuria, hematochezia, melena. Pt endorses compliance w/ home Aspirin 81 mg qd, stating last dose was 09/24/23. Patient cathed 09/23/23. Received Plavix 75 and ASA 81 09/24/23.   -BUN/Cr = 13/0.82  . EF 25%. Euvolemic on exam. IV NS @ 30 cc/hr x 8 hrs started pre procedure    Sedation Plan:   Moderate  Patient Is Suitable Candidate For Sedation?     Yes    Risks & benefits of procedure and alternative therapy have been explained to the patient including but not limited to: allergic reaction, bleeding with possible need for blood transfusion, infection, renal and vascular compromise, limb damage, arrhythmia, stroke, vessel dissection/perforation, myocardial infarction, and emergent CABG. Informed consent obtained at bedside and included in chart. 83yoF PMHx HTN, HLD, MI (2005), DM, CAD (s/p ISAAC x 3 @ [facility] most recently 2008), VT arrest (intubation and subsequent ICD placement in 2019 by Dr. Sesay), Breast cancer (s/p lumpectomy and chemo, in remission) presented to [facility] on 09/21/23 after her ICD fired x 1 with associated CP and dizziness and elevated trop r/i NSTEMI s/p Cardiac Cath @  on 09/23/23 revealing LM and double vessel CAD and was subsequently transferred to [facility]. Patient was admitted to [facility] on 09/24/23 for continous monitoring for NSTEMI and cardiac cath on 09/25/23 of LM disease.      EKG: AV block 1st deg, RBBB, TWI V4-V6		  ASA: III 	  Mallampati class: II  Anginal Class: III    -No Known Allergies    -H/H = 10.7/31.6  . Pt denies BRBPR, hematuria, hematochezia, melena. Pt endorses compliance w/ home Aspirin 81 mg qd, stating last dose was 09/24/23. Patient cathed 09/23/23. Received Plavix 75 and ASA 81 09/24/23.   -BUN/Cr = 13/0.82  . EF 25%. Euvolemic on exam. IV NS @ 30 cc/hr x 8 hrs started pre procedure    Sedation Plan:   Moderate  Patient Is Suitable Candidate For Sedation?     Yes    Risks & benefits of procedure and alternative therapy have been explained to the patient including but not limited to: allergic reaction, bleeding with possible need for blood transfusion, infection, renal and vascular compromise, limb damage, arrhythmia, stroke, vessel dissection/perforation, myocardial infarction, and emergent CABG. Informed consent obtained at bedside and included in chart.

## 2023-09-24 NOTE — H&P ADULT - PROBLEM SELECTOR PLAN 1
-Echo (09/21/23) @ [facility]: EF 25%, Grade II DD, LA dilated, mod MR, mild AR, small pericardial effusion.  -Trop I @ [facility]: 0.30>0.18>0.31 -Echo (09/21/23) @ [facility]: EF 25%, Grade II DD, LA dilated, mod MR, mild AR, small pericardial effusion.  -Trop I @ [facility]: 0.30>0.18>0.31  Cardiac Cath for tomorrow Patient is now chest pain free  -Echo (09/21/23) @ [facility]: EF 25%, Grade II DD, LA dilated, mod MR, mild AR, small pericardial effusion.  -Trop I @ [facility]: 0.30>0.18>0.31  -Cath (04/27/19):  patent ISAAC pLAD (40%), mLAD non-obs CAD, dLM 20%, LCx MLI, patent ISAAC RCA, EDP 14 mmHg.   -c/w Heparin gtt  -PLAN: Cath tomorrow, consented, fluids ordered Patient is now chest pain free  -Echo (09/21/23) @ : EF 25%, Grade II DD, LA dilated, mod MR, mild AR, small pericardial effusion.  -Trop I @ : 0.30>0.18>0.31  -Cath (04/27/19):  patent ISAAC pLAD (40%), mLAD non-obs CAD, dLM 20%, LCx MLI, patent ISAAC RCA, EDP 14 mmHg.   -c/w Heparin gtt  -PLAN: Cath tomorrow, email to schedulers sent, consent in binder, 30cc/hr x 8 hr fluids ordered Patient is now chest pain free  -Echo (09/21/23) @ : EF 25%, Grade II DD, LA dilated, mod MR, mild AR, small pericardial effusion.  -Trop I @ : 0.30>0.18>0.31  -Trop @ [facility] 6pm: 40  -Cath (04/27/19):  patent ISAAC pLAD (40%), mLAD non-obs CAD, dLM 20%, LCx MLI, patent ISAAC RCA, EDP 14 mmHg.  -Cath (09/24/23) @: LM and double vessel CAD   -c/w Heparin gtt  -PLAN: Cath tomorrow, email to schedulers sent, consent in binder, 30cc/hr x 8 hr fluids ordered Patient is now chest pain free  -Echo (09/21/23) @ : EF 25%, Grade II DD, LA dilated, mod MR, mild AR, small pericardial effusion.  -EKG  AV block 1st deg, RBBB, TWI V4-V6  -Trop I @ : 0.30>0.18>0.31  -Trop @ [facility] 6pm: 40  -Cath (04/27/19):  patent ISAAC pLAD (40%), mLAD non-obs CAD, dLM 20%, LCx MLI, patent ISAAC RCA, EDP 14 mmHg.  -Cath (09/24/23) @: LM and double vessel CAD   -c/w Heparin gtt  -PLAN: Cath tomorrow, email to schedulers sent, consent in binder, 30cc/hr x 8 hr fluids ordered

## 2023-09-24 NOTE — H&P ADULT - HISTORY OF PRESENT ILLNESS
83yoF PMHx HTN, HLD, CAD (s/p 3 stents 2008), DM, ICD placement presented to [facility] after her ICD fired x 1 with associated CP and dizziness and eveated trop r/i NSTEMI s/p Cardiac Cath @  LM and double vessel CAD and was subsequently transferred to [facility] on 09/24/23 for PCI of LM CAD.    83yoF PMHx HTN, HLD, CAD (s/p 3 stents 2008), DM, ICD placement presented to [facility]  on 09/21/23 after her ICD fired x 1 with associated CP and dizziness and eveated trop r/i NSTEMI s/p Cardiac Cath @  LM and double vessel CAD and was subsequently transferred to [facility] on 09/24/23 for PCI of LM CAD.    83yoF PMHx HTN, HLD, CAD (s/p 3 stents 2008), DM, ICD (2019 by Dr. Sesay)resented to [facility] on 09/21/23 after her ICD fired x 1 with associated CP and dizziness and eveated trop r/i NSTEMI s/p Cardiac Cath @  LM and double vessel CAD and was subsequently transferred to [facility] on 09/24/23 for PCI of LM CAD.    83yoF PMHx HTN, HLD, MI (2005), DM, CAD (s/p ISAAC x 3 @ [facility] most recently 2008), VT arrest (intubation and subsequent ICD placement in 2019 by Dr. Sesay), Breast cancer (s/p lumpectomy and chemo, in remission) presented to [facility] on 09/21/23 after her ICD fired x 1 with associated CP and dizziness and elevated trop r/i NSTEMI s/p Cardiac Cath @ [facility] and double vessel CAD and was subsequently transferred to [facility]. Patient is admitted to [facility] for continous for NSTEMI and cardiac cath on 09/25/23 of LM disease.  on       83yoF PMHx HTN, HLD, MI (2005), DM, CAD (s/p ISAAC x 3 @ [facility] most recently 2008), VT arrest (intubation and subsequent ICD placement in 2019 by Dr. Sesay), Breast cancer (s/p lumpectomy and chemo, in remission) presented to [facility] on 09/21/23 after her ICD fired x 1 with associated CP and dizziness and elevated trop r/i NSTEMI s/p Cardiac Cath @  on 09/23/23 revealing LM and double vessel CAD and was subsequently transferred to [facility]. Patient was admitted to [facility] on 09/24/23 for continous monitoring for NSTEMI and cardiac cath on 09/25/23 of LM disease.

## 2023-09-24 NOTE — H&P ADULT - PROBLEM SELECTOR PLAN 2
on metformin at home  -f/u A1c  -c/w ISS Patient euvolemic on exam, lungs clear, no edema  -Echo as above  -BNP: 2513  -c/w daily weights, strict I&O

## 2023-09-24 NOTE — H&P ADULT - PROBLEM SELECTOR PLAN 3
on metformin at home, A1c: 7.1  -c/w ISS  -consult endo on metformin at home, A1c: 7.1  -c/w ISS  -consult endo    F: 30cc/hr x 8 hr pre cath IV NS   E: Replete if K<4 or Mag<2  N: NPO at MN  VTEppx: on heparin gtt

## 2023-09-25 ENCOUNTER — TRANSCRIPTION ENCOUNTER (OUTPATIENT)
Age: 83
End: 2023-09-25

## 2023-09-25 DIAGNOSIS — E78.5 HYPERLIPIDEMIA, UNSPECIFIED: ICD-10-CM

## 2023-09-25 LAB
A1C WITH ESTIMATED AVERAGE GLUCOSE RESULT: 6.8 % — HIGH (ref 4–5.6)
ALBUMIN SERPL ELPH-MCNC: 3.6 G/DL — SIGNIFICANT CHANGE UP (ref 3.3–5)
ALP SERPL-CCNC: 57 U/L — SIGNIFICANT CHANGE UP (ref 40–120)
ALT FLD-CCNC: 31 U/L — SIGNIFICANT CHANGE UP (ref 10–45)
ANION GAP SERPL CALC-SCNC: 8 MMOL/L — SIGNIFICANT CHANGE UP (ref 5–17)
APTT BLD: 107 SEC — HIGH (ref 24.5–35.6)
APTT BLD: 44.5 SEC — HIGH (ref 24.5–35.6)
APTT BLD: 50.7 SEC — HIGH (ref 24.5–35.6)
APTT BLD: 91 SEC — HIGH (ref 24.5–35.6)
AST SERPL-CCNC: 18 U/L — SIGNIFICANT CHANGE UP (ref 10–40)
BILIRUB SERPL-MCNC: 0.6 MG/DL — SIGNIFICANT CHANGE UP (ref 0.2–1.2)
BUN SERPL-MCNC: 12 MG/DL — SIGNIFICANT CHANGE UP (ref 7–23)
CALCIUM SERPL-MCNC: 9.3 MG/DL — SIGNIFICANT CHANGE UP (ref 8.4–10.5)
CHLORIDE SERPL-SCNC: 102 MMOL/L — SIGNIFICANT CHANGE UP (ref 96–108)
CHOLEST SERPL-MCNC: 123 MG/DL — SIGNIFICANT CHANGE UP
CO2 SERPL-SCNC: 27 MMOL/L — SIGNIFICANT CHANGE UP (ref 22–31)
CREAT SERPL-MCNC: 0.76 MG/DL — SIGNIFICANT CHANGE UP (ref 0.5–1.3)
EGFR: 78 ML/MIN/1.73M2 — SIGNIFICANT CHANGE UP
ESTIMATED AVERAGE GLUCOSE: 148 MG/DL — HIGH (ref 68–114)
GLUCOSE BLDC GLUCOMTR-MCNC: 163 MG/DL — HIGH (ref 70–99)
GLUCOSE BLDC GLUCOMTR-MCNC: 165 MG/DL — HIGH (ref 70–99)
GLUCOSE BLDC GLUCOMTR-MCNC: 167 MG/DL — HIGH (ref 70–99)
GLUCOSE BLDC GLUCOMTR-MCNC: 171 MG/DL — HIGH (ref 70–99)
GLUCOSE SERPL-MCNC: 170 MG/DL — HIGH (ref 70–99)
HCT VFR BLD CALC: 34.2 % — LOW (ref 34.5–45)
HCT VFR BLD CALC: 34.4 % — LOW (ref 34.5–45)
HDLC SERPL-MCNC: 67 MG/DL — SIGNIFICANT CHANGE UP
HGB BLD-MCNC: 11.2 G/DL — LOW (ref 11.5–15.5)
HGB BLD-MCNC: 11.9 G/DL — SIGNIFICANT CHANGE UP (ref 11.5–15.5)
LIPID PNL WITH DIRECT LDL SERPL: 44 MG/DL — SIGNIFICANT CHANGE UP
MAGNESIUM SERPL-MCNC: 1.8 MG/DL — SIGNIFICANT CHANGE UP (ref 1.6–2.6)
MCHC RBC-ENTMCNC: 31.4 PG — SIGNIFICANT CHANGE UP (ref 27–34)
MCHC RBC-ENTMCNC: 32.2 PG — SIGNIFICANT CHANGE UP (ref 27–34)
MCHC RBC-ENTMCNC: 32.7 GM/DL — SIGNIFICANT CHANGE UP (ref 32–36)
MCHC RBC-ENTMCNC: 34.6 GM/DL — SIGNIFICANT CHANGE UP (ref 32–36)
MCV RBC AUTO: 93.2 FL — SIGNIFICANT CHANGE UP (ref 80–100)
MCV RBC AUTO: 95.8 FL — SIGNIFICANT CHANGE UP (ref 80–100)
NON HDL CHOLESTEROL: 56 MG/DL — SIGNIFICANT CHANGE UP
NRBC # BLD: 0 /100 WBCS — SIGNIFICANT CHANGE UP (ref 0–0)
NRBC # BLD: 0 /100 WBCS — SIGNIFICANT CHANGE UP (ref 0–0)
PLATELET # BLD AUTO: 193 K/UL — SIGNIFICANT CHANGE UP (ref 150–400)
PLATELET # BLD AUTO: 197 K/UL — SIGNIFICANT CHANGE UP (ref 150–400)
POTASSIUM SERPL-MCNC: 4 MMOL/L — SIGNIFICANT CHANGE UP (ref 3.5–5.3)
POTASSIUM SERPL-SCNC: 4 MMOL/L — SIGNIFICANT CHANGE UP (ref 3.5–5.3)
PROT SERPL-MCNC: 6.5 G/DL — SIGNIFICANT CHANGE UP (ref 6–8.3)
RBC # BLD: 3.57 M/UL — LOW (ref 3.8–5.2)
RBC # BLD: 3.69 M/UL — LOW (ref 3.8–5.2)
RBC # FLD: 14 % — SIGNIFICANT CHANGE UP (ref 10.3–14.5)
RBC # FLD: 14.2 % — SIGNIFICANT CHANGE UP (ref 10.3–14.5)
SODIUM SERPL-SCNC: 137 MMOL/L — SIGNIFICANT CHANGE UP (ref 135–145)
TRIGL SERPL-MCNC: 62 MG/DL — SIGNIFICANT CHANGE UP
WBC # BLD: 5.1 K/UL — SIGNIFICANT CHANGE UP (ref 3.8–10.5)
WBC # BLD: 5.81 K/UL — SIGNIFICANT CHANGE UP (ref 3.8–10.5)
WBC # FLD AUTO: 5.1 K/UL — SIGNIFICANT CHANGE UP (ref 3.8–10.5)
WBC # FLD AUTO: 5.81 K/UL — SIGNIFICANT CHANGE UP (ref 3.8–10.5)

## 2023-09-25 PROCEDURE — 71046 X-RAY EXAM CHEST 2 VIEWS: CPT | Mod: 26

## 2023-09-25 PROCEDURE — 93306 TTE W/DOPPLER COMPLETE: CPT | Mod: 26

## 2023-09-25 PROCEDURE — 99223 1ST HOSP IP/OBS HIGH 75: CPT | Mod: 25

## 2023-09-25 PROCEDURE — 99152 MOD SED SAME PHYS/QHP 5/>YRS: CPT

## 2023-09-25 PROCEDURE — 93880 EXTRACRANIAL BILAT STUDY: CPT | Mod: 26

## 2023-09-25 PROCEDURE — 99223 1ST HOSP IP/OBS HIGH 75: CPT

## 2023-09-25 PROCEDURE — 93458 L HRT ARTERY/VENTRICLE ANGIO: CPT | Mod: 26

## 2023-09-25 RX ORDER — MAGNESIUM OXIDE 400 MG ORAL TABLET 241.3 MG
800 TABLET ORAL ONCE
Refills: 0 | Status: COMPLETED | OUTPATIENT
Start: 2023-09-25 | End: 2023-09-25

## 2023-09-25 RX ORDER — SODIUM CHLORIDE 9 MG/ML
500 INJECTION INTRAMUSCULAR; INTRAVENOUS; SUBCUTANEOUS
Refills: 0 | Status: DISCONTINUED | OUTPATIENT
Start: 2023-09-25 | End: 2023-09-27

## 2023-09-25 RX ADMIN — HEPARIN SODIUM 850 UNIT(S)/HR: 5000 INJECTION INTRAVENOUS; SUBCUTANEOUS at 22:51

## 2023-09-25 RX ADMIN — HEPARIN SODIUM 700 UNIT(S)/HR: 5000 INJECTION INTRAVENOUS; SUBCUTANEOUS at 16:07

## 2023-09-25 RX ADMIN — HEPARIN SODIUM 850 UNIT(S)/HR: 5000 INJECTION INTRAVENOUS; SUBCUTANEOUS at 08:32

## 2023-09-25 RX ADMIN — Medication 1: at 17:13

## 2023-09-25 RX ADMIN — Medication 81 MILLIGRAM(S): at 05:59

## 2023-09-25 RX ADMIN — SACUBITRIL AND VALSARTAN 1 TABLET(S): 24; 26 TABLET, FILM COATED ORAL at 17:14

## 2023-09-25 RX ADMIN — CLOPIDOGREL BISULFATE 75 MILLIGRAM(S): 75 TABLET, FILM COATED ORAL at 05:59

## 2023-09-25 RX ADMIN — HEPARIN SODIUM 0 UNIT(S)/HR: 5000 INJECTION INTRAVENOUS; SUBCUTANEOUS at 01:22

## 2023-09-25 RX ADMIN — MAGNESIUM OXIDE 400 MG ORAL TABLET 800 MILLIGRAM(S): 241.3 TABLET ORAL at 08:00

## 2023-09-25 RX ADMIN — HEPARIN SODIUM 0 UNIT(S)/HR: 5000 INJECTION INTRAVENOUS; SUBCUTANEOUS at 15:30

## 2023-09-25 RX ADMIN — ATORVASTATIN CALCIUM 40 MILLIGRAM(S): 80 TABLET, FILM COATED ORAL at 22:06

## 2023-09-25 RX ADMIN — SODIUM CHLORIDE 30 MILLILITER(S): 9 INJECTION INTRAMUSCULAR; INTRAVENOUS; SUBCUTANEOUS at 00:36

## 2023-09-25 RX ADMIN — SODIUM CHLORIDE 30 MILLILITER(S): 9 INJECTION INTRAMUSCULAR; INTRAVENOUS; SUBCUTANEOUS at 12:46

## 2023-09-25 RX ADMIN — Medication 1: at 08:35

## 2023-09-25 RX ADMIN — SACUBITRIL AND VALSARTAN 1 TABLET(S): 24; 26 TABLET, FILM COATED ORAL at 05:59

## 2023-09-25 RX ADMIN — HEPARIN SODIUM 700 UNIT(S)/HR: 5000 INJECTION INTRAVENOUS; SUBCUTANEOUS at 02:25

## 2023-09-25 RX ADMIN — Medication 1: at 12:36

## 2023-09-25 RX ADMIN — HEPARIN SODIUM 700 UNIT(S)/HR: 5000 INJECTION INTRAVENOUS; SUBCUTANEOUS at 20:22

## 2023-09-25 RX ADMIN — Medication 50 MILLIGRAM(S): at 17:14

## 2023-09-25 NOTE — PROGRESS NOTE ADULT - PROBLEM SELECTOR PLAN 3
on metformin at home, A1c: 7.1  -c/w ISS  -consult endo    F: 30cc/hr x 8 hr pre cath IV NS   E: Replete if K<4 or Mag<2  N: NPO at MN  VTEppx: on heparin gtt On metformin at home, A1c: 6.8  -c/w ISS      F: None  E: Replete if K<4 or Mag<2  N: DASH Diet  Dispo: Pending cardiac cath     Case discussed with Dr. Galeana Lipid panel wnl  - Continue atorva 40mg qhs

## 2023-09-25 NOTE — PROGRESS NOTE ADULT - SUBJECTIVE AND OBJECTIVE BOX
INCOMPLETE    Cardiology PA Adult Progress Note    SUBJECTIVE ASSESSMENT:  	  MEDICATIONS:  metoprolol succinate ER 50 milliGRAM(s) Oral daily  sacubitril 24 mG/valsartan 26 mG 1 Tablet(s) Oral two times a day            atorvastatin 40 milliGRAM(s) Oral at bedtime  dextrose 50% Injectable 25 Gram(s) IV Push once  dextrose 50% Injectable 12.5 Gram(s) IV Push once  dextrose 50% Injectable 25 Gram(s) IV Push once  dextrose Oral Gel 15 Gram(s) Oral once PRN  glucagon  Injectable 1 milliGRAM(s) IntraMuscular once  insulin lispro (ADMELOG) corrective regimen sliding scale   SubCutaneous three times a day before meals  insulin lispro (ADMELOG) corrective regimen sliding scale   SubCutaneous at bedtime    aspirin enteric coated 81 milliGRAM(s) Oral daily  clopidogrel Tablet 75 milliGRAM(s) Oral daily  dextrose 5%. 1000 milliLiter(s) IV Continuous <Continuous>  dextrose 5%. 1000 milliLiter(s) IV Continuous <Continuous>  fluticasone propionate 50 MICROgram(s)/spray Nasal Spray 1 Spray(s) Both Nostrils two times a day  heparin   Injectable 4400 Unit(s) IV Push every 6 hours PRN  heparin  Infusion.  Unit(s)/Hr IV Continuous <Continuous>  influenza  Vaccine (HIGH DOSE) 0.7 milliLiter(s) IntraMuscular once  sodium chloride 0.9%. 500 milliLiter(s) IV Continuous <Continuous>    	  VITAL SIGNS:  T(C): 36.4 (09-25-23 @ 08:35), Max: 36.7 (09-24-23 @ 20:44)  HR: 57 (09-25-23 @ 08:35) (57 - 62)  BP: 135/62 (09-25-23 @ 08:35) (113/58 - 135/68)  RR: 18 (09-25-23 @ 08:35) (18 - 18)  SpO2: 98% (09-25-23 @ 08:35) (94% - 98%)  Wt(kg): --    I&O's Summary    Height (cm): 167.6 (09-24 @ 15:42)  Weight (kg): 71.4 (09-24 @ 15:42)  BMI (kg/m2): 25.4 (09-24 @ 15:42)  BSA (m2): 1.81 (09-24 @ 15:42)                                     PHYSICAL EXAM:  Appearance: Normal	  HEENT: Normal oral mucosa, PERRL, EOMI	  Neck: Supple, + JVD/ - JVD; ___ Carotid Bruit   Cardiovascular: Normal S1 S2, No murmurs  Respiratory: Lungs clear to auscultation/Decreased Breath Sounds/No Rales, Rhonchi, Wheezing	  Gastrointestinal:  Soft, Non-tender, + BS	  Skin: No rashes, No ecchymoses, No cyanosis  Extremities: Normal range of motion, No clubbing, cyanosis or edema  Vascular: Peripheral pulses palpable 2+ bilaterally  Neurologic: Non-focal  Psychiatry: A & O x 3, Mood & affect appropriate    LABS:	 	                                  11.2   5.10  )-----------( 193      ( 25 Sep 2023 06:35 )             34.2     09-25    137  |  102  |  12  ----------------------------<  170<H>  4.0   |  27  |  0.76    Ca    9.3      25 Sep 2023 06:35  Mg     1.8     09-25    TPro  6.5  /  Alb  3.6  /  TBili  0.6  /  DBili  x   /  AST  18  /  ALT  31  /  AlkPhos  57  09-25    proBNP:   Lipid Profile:   HgA1c:   TSH:   PT/INR - ( 24 Sep 2023 16:33 )   PT: 12.8 sec;   INR: 1.13          PTT - ( 25 Sep 2023 08:12 )  PTT:50.7 sec Cardiology PA Adult Progress Note    SUBJECTIVE ASSESSMENT: Met with and examined the pt at bedside this AM seen sitting comfortably in the chair. She denies any CP, SOB, n/v, dizziness, LE edema, diaphoresis palpitations   	  MEDICATIONS:  metoprolol succinate ER 50 milliGRAM(s) Oral daily  sacubitril 24 mG/valsartan 26 mG 1 Tablet(s) Oral two times a day    atorvastatin 40 milliGRAM(s) Oral at bedtime  dextrose 50% Injectable 25 Gram(s) IV Push once  dextrose 50% Injectable 12.5 Gram(s) IV Push once  dextrose 50% Injectable 25 Gram(s) IV Push once  dextrose Oral Gel 15 Gram(s) Oral once PRN  glucagon  Injectable 1 milliGRAM(s) IntraMuscular once  insulin lispro (ADMELOG) corrective regimen sliding scale   SubCutaneous three times a day before meals  insulin lispro (ADMELOG) corrective regimen sliding scale   SubCutaneous at bedtime    aspirin enteric coated 81 milliGRAM(s) Oral daily  clopidogrel Tablet 75 milliGRAM(s) Oral daily  dextrose 5%. 1000 milliLiter(s) IV Continuous <Continuous>  dextrose 5%. 1000 milliLiter(s) IV Continuous <Continuous>  fluticasone propionate 50 MICROgram(s)/spray Nasal Spray 1 Spray(s) Both Nostrils two times a day  heparin   Injectable 4400 Unit(s) IV Push every 6 hours PRN  heparin  Infusion.  Unit(s)/Hr IV Continuous <Continuous>  influenza  Vaccine (HIGH DOSE) 0.7 milliLiter(s) IntraMuscular once  sodium chloride 0.9%. 500 milliLiter(s) IV Continuous <Continuous>    	  VITAL SIGNS:  T(C): 36.4 (09-25-23 @ 08:35), Max: 36.7 (09-24-23 @ 20:44)  HR: 57 (09-25-23 @ 08:35) (57 - 62)  BP: 135/62 (09-25-23 @ 08:35) (113/58 - 135/68)  RR: 18 (09-25-23 @ 08:35) (18 - 18)  SpO2: 98% (09-25-23 @ 08:35) (94% - 98%)  Wt(kg): --    I&O's Summary    Height (cm): 167.6 (09-24 @ 15:42)  Weight (kg): 71.4 (09-24 @ 15:42)  BMI (kg/m2): 25.4 (09-24 @ 15:42)  BSA (m2): 1.81 (09-24 @ 15:42)                                     PHYSICAL EXAM:  Appearance: Normal	  HEENT: Normal oral mucosa, EOMI	  Neck: Supple, - JVD  Cardiovascular: Normal S1 S2, No murmurs  Respiratory: Lungs clear to auscultation. No Rales, Rhonchi, Wheezing	  Gastrointestinal:  Soft, Non-tender, + BS	  Skin: No rashes, No ecchymoses, No cyanosis  Extremities: Normal range of motion, No clubbing, cyanosis or edema  Vascular: Peripheral pulses palpable 2+ bilaterally  Neurologic: Non-focal  Psychiatry: A & O x 3, Mood & affect appropriate    LABS:	 	                          11.2   5.10  )-----------( 193      ( 25 Sep 2023 06:35 )             34.2     09-25    137  |  102  |  12  ----------------------------<  170<H>  4.0   |  27  |  0.76    Ca    9.3      25 Sep 2023 06:35  Mg     1.8     09-25    TPro  6.5  /  Alb  3.6  /  TBili  0.6  /  DBili  x   /  AST  18  /  ALT  31  /  AlkPhos  57  09-25    proBNP:   Lipid Profile:   HgA1c:   TSH:   PT/INR - ( 24 Sep 2023 16:33 )   PT: 12.8 sec;   INR: 1.13          PTT - ( 25 Sep 2023 08:12 )  PTT:50.7 sec Cardiology PA Adult Progress Note    SUBJECTIVE ASSESSMENT: Met with and examined the pt at bedside this AM seen lying comfortably in bed. She denies any CP, SOB, n/v, dizziness, LE edema, diaphoresis palpitations   	  MEDICATIONS:  metoprolol succinate ER 50 milliGRAM(s) Oral daily  sacubitril 24 mG/valsartan 26 mG 1 Tablet(s) Oral two times a day    atorvastatin 40 milliGRAM(s) Oral at bedtime  dextrose 50% Injectable 25 Gram(s) IV Push once  dextrose 50% Injectable 12.5 Gram(s) IV Push once  dextrose 50% Injectable 25 Gram(s) IV Push once  dextrose Oral Gel 15 Gram(s) Oral once PRN  glucagon  Injectable 1 milliGRAM(s) IntraMuscular once  insulin lispro (ADMELOG) corrective regimen sliding scale   SubCutaneous three times a day before meals  insulin lispro (ADMELOG) corrective regimen sliding scale   SubCutaneous at bedtime    aspirin enteric coated 81 milliGRAM(s) Oral daily  clopidogrel Tablet 75 milliGRAM(s) Oral daily  dextrose 5%. 1000 milliLiter(s) IV Continuous <Continuous>  dextrose 5%. 1000 milliLiter(s) IV Continuous <Continuous>  fluticasone propionate 50 MICROgram(s)/spray Nasal Spray 1 Spray(s) Both Nostrils two times a day  heparin   Injectable 4400 Unit(s) IV Push every 6 hours PRN  heparin  Infusion.  Unit(s)/Hr IV Continuous <Continuous>  influenza  Vaccine (HIGH DOSE) 0.7 milliLiter(s) IntraMuscular once  sodium chloride 0.9%. 500 milliLiter(s) IV Continuous <Continuous>    	  VITAL SIGNS:  T(C): 36.4 (09-25-23 @ 08:35), Max: 36.7 (09-24-23 @ 20:44)  HR: 57 (09-25-23 @ 08:35) (57 - 62)  BP: 135/62 (09-25-23 @ 08:35) (113/58 - 135/68)  RR: 18 (09-25-23 @ 08:35) (18 - 18)  SpO2: 98% (09-25-23 @ 08:35) (94% - 98%)  Wt(kg): --    I&O's Summary    Height (cm): 167.6 (09-24 @ 15:42)  Weight (kg): 71.4 (09-24 @ 15:42)  BMI (kg/m2): 25.4 (09-24 @ 15:42)  BSA (m2): 1.81 (09-24 @ 15:42)                                     PHYSICAL EXAM:  Appearance: Normal	  HEENT: Normal oral mucosa, EOMI	  Neck: Supple, - JVD  Cardiovascular: Normal S1 S2, No murmurs  Respiratory: Lungs clear to auscultation. No Rales, Rhonchi, Wheezing	  Gastrointestinal:  Soft, Non-tender, + BS	  Skin: No rashes, No ecchymoses, No cyanosis  Extremities: Normal range of motion, No clubbing, cyanosis or edema  Vascular: Peripheral pulses palpable 2+ bilaterally  Neurologic: Non-focal  Psychiatry: A & O x 3, Mood & affect appropriate    LABS:	 	                          11.2   5.10  )-----------( 193      ( 25 Sep 2023 06:35 )             34.2     09-25    137  |  102  |  12  ----------------------------<  170<H>  4.0   |  27  |  0.76    Ca    9.3      25 Sep 2023 06:35  Mg     1.8     09-25    TPro  6.5  /  Alb  3.6  /  TBili  0.6  /  DBili  x   /  AST  18  /  ALT  31  /  AlkPhos  57  09-25    proBNP:   Lipid Profile:   HgA1c:   TSH:   PT/INR - ( 24 Sep 2023 16:33 )   PT: 12.8 sec;   INR: 1.13          PTT - ( 25 Sep 2023 08:12 )  PTT:50.7 sec

## 2023-09-25 NOTE — DISCHARGE NOTE PROVIDER - NSDCCPTREATMENT_GEN_ALL_CORE_FT
PRINCIPAL PROCEDURE  Procedure: CABG, with HESHAM  Findings and Treatment: OPCAB x 2 LIMA-LAD SVG- OM EF 25%

## 2023-09-25 NOTE — PROGRESS NOTE ADULT - PROBLEM SELECTOR PLAN 1
Patient is now chest pain free  -Echo (09/21/23) @ : EF 25%, Grade II DD, LA dilated, mod MR, mild AR, small pericardial effusion.  -EKG  AV block 1st deg, RBBB, TWI V4-V6  -Trop I @ : 0.30>0.18>0.31  -Trop @ [facility] 6pm: 40  -Cath (04/27/19):  patent ISAAC pLAD (40%), mLAD non-obs CAD, dLM 20%, LCx MLI, patent ISAAC RCA, EDP 14 mmHg.  -Cath (09/24/23) @: LM and double vessel CAD   -c/w Heparin gtt  -PLAN: Cath tomorrow, email to schedulers sent, consent in binder, 30cc/hr x 8 hr fluids ordered Presented to  w CP, cath w LM and double vessel CAD. Currently CP free, HD stable  -Echo 09/21/23 @ : EF 25%, Grade II DD, LA dilated, mod MR, mild AR, small pericardial effusion.  -EKG AV block 1st deg, RBBB, TWI V4-V6  -Trop I @ : 0.30>0.18>0.31, Trop @ [facility] 40  -Cardiac cath 9/24/23 @: LM and double vessel CAD   -c/w Heparin gtt prior to cath  -s/p cardiac cath 9/25/23:   -C/w ASA/Plavix/statin Presented to  w CP, cath w LM and double vessel CAD. Currently CP free, HD stable  -Echo 09/21/23 @ : EF 25%, Grade II DD, LA dilated, mod MR, mild AR, small pericardial effusion.  -EKG AV block 1st deg, RBBB, TWI V4-V6  -Trop I @ : 0.30>0.18>0.31, Trop @ [facility] 40  -Cardiac cath 9/24/23 @: LM and double vessel CAD   -s/p cardiac cath 9/25/23: LM distal calcified 80% stenosis, oLAD 80% calcified stenosis, oCLCx 80%  calcified stensis. IVUS LM 3.7-4.4 MCA.   - CTS consult () regarding hybrid procedure vs CABG  -C/w ASA/Plavix/statin

## 2023-09-25 NOTE — PROGRESS NOTE ADULT - ASSESSMENT
83yoF PMHx HTN, HLD, MI (2005), DM, CAD (s/p ISAAC x 3 @ [facility] most recently 2008), VT arrest (intubation and subsequent ICD placement in 2019 by Dr. Sesay), Breast cancer (s/p lumpectomy and chemo, in remission) presented to [facility] on 09/21/23 after her ICD fired x 1 with associated CP and dizziness and elevated trop r/i NSTEMI s/p Cardiac Cath @  on 09/23/23 revealing LM and double vessel CAD and was subsequently transferred to [facility]. Patient was admitted to [facility] on 09/24/23 for continous monitoring for NSTEMI and cardiac cath on 09/25/23 of LM disease.      EKG: AV block 1st deg, RBBB, TWI V4-V6		  ASA: III 	  Mallampati class: II  Anginal Class: III    -No Known Allergies    -H/H = 10.7/31.6  . Pt denies BRBPR, hematuria, hematochezia, melena. Pt endorses compliance w/ home Aspirin 81 mg qd, stating last dose was 09/24/23. Patient cathed 09/23/23. Received Plavix 75 and ASA 81 09/24/23.   -BUN/Cr = 13/0.82  . EF 25%. Euvolemic on exam. IV NS @ 30 cc/hr x 8 hrs started pre procedure    Sedation Plan:   Moderate  Patient Is Suitable Candidate For Sedation?     Yes    Risks & benefits of procedure and alternative therapy have been explained to the patient including but not limited to: allergic reaction, bleeding with possible need for blood transfusion, infection, renal and vascular compromise, limb damage, arrhythmia, stroke, vessel dissection/perforation, myocardial infarction, and emergent CABG. Informed consent obtained at bedside and included in chart. 82yo F with PMHx HTN, HLD, MI (2005), DM, CAD (s/p ISAAC x 3 @ [facility] most recently 2008), VT arrest (intubation and subsequent ICD placement in 2019 by Dr. Sesay), breast cancer (s/p lumpectomy and chemo - in remission) who initially presented to [facility] on 09/21/23 after her ICD fired x 1 with associated CP and dizziness and elevated trop, r/i NSTEMI now s/p cardiac cath @  on 09/23/23 revealing LM and double vessel CAD and was subsequently transferred to [facility] on 09/24/23 for high risk cardiac cath with Dr. Wright. Pt is now s/p cardiac cath on 09/25/23.    84yo F with PMHx HTN, HLD, MI (2005), DM, CAD (s/p ISAAC x 3 @ [facility] most recently 2008), VT arrest (intubation and subsequent ICD placement in 2019 by Dr. Sesay), breast cancer (s/p lumpectomy and chemo - in remission) who initially presented to [facility] on 09/21/23 after her ICD fired x 1 with associated CP and dizziness and elevated trop, r/i NSTEMI now s/p cardiac cath @  on 09/23/23 revealing LM and double vessel CAD and was subsequently transferred to [facility] on 09/24/23 for high risk cardiac cath with Dr. Wright. Pt is now s/p cardiac cath on 09/25/23 with severe stenosis LM, pending CTS consult   82yo F with PMHx HTN, HLD, MI (2005), DM, CAD (s/p ISAAC x 3 @ [facility] most recently 2008), VT arrest (intubation and subsequent ICD placement in 2019 by Dr. Sesay), breast cancer (s/p lumpectomy and chemo - in remission) who initially presented to [facility] on 09/21/23 after her ICD fired x 1 with associated CP and dizziness and elevated trop, r/i NSTEMI now s/p cardiac cath @  on 09/23/23 revealing LM and double vessel CAD and was subsequently transferred to [facility] on 09/24/23 for high risk cardiac cath with Dr. Wright. Pt is now s/p cardiac cath on 09/25/23 with severe stenosis LM, CTS consulted for evaluation for surgical revascularization.

## 2023-09-25 NOTE — DISCHARGE NOTE PROVIDER - CARE PROVIDER_API CALL
SCOTT LIPSCOMB  Scheduled Appointment: 10/11/2023 11:00 AM    Sylvester Wright  Interventional Cardiology  [address]  [place] [address]  Phone: [phone]  Fax: [phone]  Follow Up Time:

## 2023-09-25 NOTE — DISCHARGE NOTE PROVIDER - HOSPITAL COURSE
INCOMPLETE       INCOMPLETE    82yo F with PMHx HTN, HLD, MI (2005), DM, CAD (s/p ISAAC x 3 @ [facility] most recently 2008), VT arrest (intubation and subsequent ICD placement in 2019 by Dr. Sesay), breast cancer (s/p lumpectomy and chemo - in remission) who initially presented to [facility] on 09/21/23 after her ICD fired x 1 with associated CP and dizziness and elevated trop, r/i NSTEMI now s/p cardiac cath @  on 09/23/23 revealing LM and double vessel CAD and was subsequently transferred to [facility] on 09/24/23 for high risk cardiac cath with Dr. Wright. Pt is now s/p cardiac cath on 09/25/23 with severe stenosis LM, CTS consulted for evaluation for surgical revascularization.     INCOMPLETE    84yo F with PMHx HTN, HLD, MI (2005), DM, CAD (s/p ISAAC x 3 @ [facility] most recently 2008), VT arrest (intubation and subsequent ICD placement in 2019 by Dr. Sesay), breast cancer (s/p lumpectomy and chemo - in remission) who initially presented to [facility] on 09/21/23 after her ICD fired x 1 with associated CP and dizziness and elevated trop, r/i NSTEMI now s/p cardiac cath @  on 09/23/23 revealing LM and double vessel CAD and was subsequently transferred to [facility] on 09/24/23 for high risk cardiac cath with Dr. Wright. Pt is now s/p cardiac cath on 09/25/23 with severe stenosis LM, CTS consulted for evaluation for surgical revascularization.      INCOMPLETE    84yo F with PMHx HTN, HLD, MI (2005), DM, CAD (s/p ISAAC x 3 @ [facility] most recently 2008), VT arrest (intubation and subsequent ICD placement in 2019 by Dr. Sesay), breast cancer (s/p lumpectomy and chemo - in remission) who initially presented to [facility] on 09/21/23 after her ICD fired x 1 with associated CP and dizziness and elevated trop, r/i NSTEMI now s/p cardiac cath @  on 09/23/23 revealing LM and double vessel CAD and was subsequently transferred to [facility] on 09/24/23 for high risk cardiac cath with Dr. Wright. Pt is now s/p cardiac cath on 09/25/23 with severe stenosis LM, CTS consulted for evaluation for surgical revascularization. On 9/27/23 she underwent a OPCABx2 (LIMA to LAD, SVG to OM). Intraoperative course was uncomplicated. She arrived to the ICU intubated on on Levo. She woke up non focal in the ICU with a worsening acidosis. She was started on a insulin gtt and IV Fluids. Acidosis improved and she was extubated on NC. On POD#1 she was weaned off of pressors. Chest tubes, bernice and jalloh were removed and she was transferred to the floor. On POD#2 On POD#2 patient had a sudden drop in BP and oxygen saturation after a walk. Lactate increased to 5. She was given volume back and started on high flow. Lactate and blood pressure improved. On POD#3 she was weaned off of HFNC. POD#4 schest xray demonstrated an enlarged cardiac silloutte with a drop in hemoglobin. A bedside echo was performed to rule out pericardial effusion. No effusion was noted. She was given 1 unit of PRBCs. This morning patient was started on a low dose lopressor which she tolerated. As per Dr. aGrcía and Gabby patient is ready for discharge   84yo F with PMHx HTN, HLD, MI (2005), DM, CAD (s/p ISAAC x 3 @ [facility] most recently 2008), VT arrest (intubation and subsequent ICD placement in 2019 by Dr. Sesay), breast cancer (s/p lumpectomy and chemo - in remission) who initially presented to [facility] on 09/21/23 after her ICD fired x 1 with associated CP and dizziness and elevated trop, r/i NSTEMI now s/p cardiac cath @  on 09/23/23 revealing LM and double vessel CAD and was subsequently transferred to [facility] on 09/24/23 for high risk cardiac cath with Dr. Wright. Pt is now s/p cardiac cath on 09/25/23 with severe stenosis LM, CTS consulted for evaluation for surgical revascularization. On 9/27/23 she underwent a OPCABx2 (LIMA to LAD, SVG to OM). Intraoperative course was uncomplicated. She arrived to the ICU intubated on on Levo. She woke up non focal in the ICU with a worsening acidosis. She was started on a insulin gtt and IV Fluids. Acidosis improved and she was extubated on NC. On POD#1 she was weaned off of pressors. Chest tubes, bernice and jalloh were removed and she was transferred to the floor. On POD#2 On POD#2 patient had a sudden drop in BP and oxygen saturation after a walk. Lactate increased to 5. She was given volume back and started on high flow. Lactate and blood pressure improved. On POD#3 she was weaned off of HFNC. POD#4 schest xray demonstrated an enlarged cardiac silloutte with a drop in hemoglobin. A bedside echo was performed to rule out pericardial effusion. No effusion was noted. She was given 1 unit of PRBCs. This morning patient was started on a low dose lopressor which she tolerated. As per Dr. Mcnulty patient is ready for discharge home.       GEN: NAD, looks comfortable  Neuro: A&Ox3.  No focal deficits.  Moving all extremities.   HEENT: No obvious abnormalities  CV: S1S2, regular, no murmurs appreciated.  No carotid bruits.  No JVD  Lungs: decreased lung sounds at bilteral bases. No wheeze or rhonchi  ABD: Soft, non-tender, non-distended.  +Bowel sounds  EXT: Warm and well perfused.  Right leg wihtout edema. Left leg with trace edema.. All distal pulses palpable.   Musculoskeletal: Moving all extremities with normal ROM, no joint swelling  incision: MSI c/d/i Left EVH intact. 2 Tie down sutures inplace.

## 2023-09-25 NOTE — DISCHARGE NOTE PROVIDER - NSDCCPCAREPLAN_GEN_ALL_CORE_FT
PRINCIPAL DISCHARGE DIAGNOSIS  Diagnosis: CAD (coronary artery disease)  Assessment and Plan of Treatment: You were found to have blockages in the arteries of your heart, also known as Coronary Artery Disease. You underwent a cardiac catheterization on ___ and received a stent to the ___ artery.  PLEASE CONTINUE ASPIRIN 81MG DAILY AND PLAVIX 75MG DAILY. DO NOT STOP THESE MEDICATIONS FOR ANY REASON AS THEY ARE KEEPING YOUR STENT OPEN AND PREVENTING A HEART ATTACK.   Avoid strenuous activity or heavy lifting anything more than 5lbs for the next five days. Do not take a bath or swim for the next five days; you may shower. For any bleeding or hematoma formation (hardened blood collection under the skin) at the access site of your ____ please hold pressure and go to the emergency room. Please follow up with  ___ in 1-2 weeks. For recurrent chest pain, please call your doctor or go to the emergency room.        SECONDARY DISCHARGE DIAGNOSES  Diagnosis: HTN (hypertension)  Assessment and Plan of Treatment: Please continue ____ as listed to keep your blood pressure controlled. For blood pressure that is too high or too low please see your doctor or go to the emergency room as necessary.      Diagnosis: HLD (hyperlipidemia)  Assessment and Plan of Treatment: Please continue ____ at bedtime to keep your cholesterol low. High cholesterol contributes to heart disease.      Diagnosis: DM (diabetes mellitus)  Assessment and Plan of Treatment: Your Hemoglobin A1c is ___ and your goal A1c is less than 7.0%. This number measures your average blood sugar level over the last three months.  Please continue ___ as listed for diabetes. Maintain a low carbohydrate, low sugar diet, exercise, monitor your fingerstick blood sugars regarly and follow up with your Endocrinologist/Primary Care Doctor.  If you are a diabetic and you take Metformin: DO NOT TAKE your Metformin for two days. This medication can interact with the contrast used during your procedure therefore we want to ensure the contrast has left your body prior to you restarting your Metformin.       PRINCIPAL DISCHARGE DIAGNOSIS  Diagnosis: CAD (coronary artery disease)  Assessment and Plan of Treatment: You were found to have blockages in the arteries of your heart, also known as Coronary Artery Disease. You underwent a cardiac catheterization on 9/25/23 which revealed severe stenosis of the left main coronary artery.  PLEASE CONTINUE ASPIRIN 81MG DAILY AND PLAVIX 75MG DAILY. DO NOT STOP THESE MEDICATIONS FOR ANY REASON AS THEY ARE KEEPING YOUR STENT OPEN AND PREVENTING A HEART ATTACK.   Avoid strenuous activity or heavy lifting anything more than 5lbs for the next five days. Do not take a bath or swim for the next five days; you may shower. For any bleeding or hematoma formation (hardened blood collection under the skin) at the access site of your right wrist please hold pressure and go to the emergency room. Please follow up with  ___ in 1-2 weeks. For recurrent chest pain, please call your doctor or go to the emergency room.        SECONDARY DISCHARGE DIAGNOSES  Diagnosis: HTN (hypertension)  Assessment and Plan of Treatment: Please continue ____ as listed to keep your blood pressure controlled. For blood pressure that is too high or too low please see your doctor or go to the emergency room as necessary.      Diagnosis: HLD (hyperlipidemia)  Assessment and Plan of Treatment: Please continue ____ at bedtime to keep your cholesterol low. High cholesterol contributes to heart disease.      Diagnosis: DM (diabetes mellitus)  Assessment and Plan of Treatment: Your Hemoglobin A1c is ___ and your goal A1c is less than 7.0%. This number measures your average blood sugar level over the last three months.  Please continue ___ as listed for diabetes. Maintain a low carbohydrate, low sugar diet, exercise, monitor your fingerstick blood sugars regarly and follow up with your Endocrinologist/Primary Care Doctor.  If you are a diabetic and you take Metformin: DO NOT TAKE your Metformin for two days. This medication can interact with the contrast used during your procedure therefore we want to ensure the contrast has left your body prior to you restarting your Metformin.       PRINCIPAL DISCHARGE DIAGNOSIS  Diagnosis: CAD (coronary artery disease)  Assessment and Plan of Treatment: You were found to have blockages in the arteries of your heart, also known as Coronary Artery Disease. You underwent a cardiac catheterization on 9/25/23 which revealed severe stenosis of the left main coronary artery.   PLEASE CONTINUE ASPIRIN 81MG DAILY AND PLAVIX 75MG DAILY. DO NOT STOP THESE MEDICATIONS FOR ANY REASON AS THEY ARE KEEPING YOUR STENT OPEN AND PREVENTING A HEART ATTACK.   Avoid strenuous activity or heavy lifting anything more than 5lbs for the next five days. Do not take a bath or swim for the next five days; you may shower. For any bleeding or hematoma formation (hardened blood collection under the skin) at the access site of your right wrist please hold pressure and go to the emergency room. Please follow up with  ___ in 1-2 weeks. For recurrent chest pain, please call your doctor or go to the emergency room.        SECONDARY DISCHARGE DIAGNOSES  Diagnosis: HTN (hypertension)  Assessment and Plan of Treatment: Please continue ____ as listed to keep your blood pressure controlled. For blood pressure that is too high or too low please see your doctor or go to the emergency room as necessary.      Diagnosis: HLD (hyperlipidemia)  Assessment and Plan of Treatment: Please continue ____ at bedtime to keep your cholesterol low. High cholesterol contributes to heart disease.      Diagnosis: DM (diabetes mellitus)  Assessment and Plan of Treatment: Your Hemoglobin A1c is ___ and your goal A1c is less than 7.0%. This number measures your average blood sugar level over the last three months.  Please continue ___ as listed for diabetes. Maintain a low carbohydrate, low sugar diet, exercise, monitor your fingerstick blood sugars regarly and follow up with your Endocrinologist/Primary Care Doctor.  If you are a diabetic and you take Metformin: DO NOT TAKE your Metformin for two days. This medication can interact with the contrast used during your procedure therefore we want to ensure the contrast has left your body prior to you restarting your Metformin.       PRINCIPAL DISCHARGE DIAGNOSIS  Diagnosis: CAD (coronary artery disease)  Assessment and Plan of Treatment:       SECONDARY DISCHARGE DIAGNOSES  Diagnosis: HTN (hypertension)  Assessment and Plan of Treatment: Please continue ____ as listed to keep your blood pressure controlled. For blood pressure that is too high or too low please see your doctor or go to the emergency room as necessary.      Diagnosis: HLD (hyperlipidemia)  Assessment and Plan of Treatment: Please continue ____ at bedtime to keep your cholesterol low. High cholesterol contributes to heart disease.      Diagnosis: DM (diabetes mellitus)  Assessment and Plan of Treatment: Your Hemoglobin A1c is ___ and your goal A1c is less than 7.0%. This number measures your average blood sugar level over the last three months.  Please continue ___ as listed for diabetes. Maintain a low carbohydrate, low sugar diet, exercise, monitor your fingerstick blood sugars regarly and follow up with your Endocrinologist/Primary Care Doctor.  If you are a diabetic and you take Metformin: DO NOT TAKE your Metformin for two days. This medication can interact with the contrast used during your procedure therefore we want to ensure the contrast has left your body prior to you restarting your Metformin.

## 2023-09-25 NOTE — PROGRESS NOTE ADULT - PROBLEM SELECTOR PLAN 4
On metformin at home, A1c: 6.8  -c/w ISS      F: None  E: Replete if K<4 or Mag<2  N: DASH Diet  Dispo: Pending cardiac cath     Case discussed with Dr. Galeana

## 2023-09-25 NOTE — DISCHARGE NOTE PROVIDER - NSDCMRMEDTOKEN_GEN_ALL_CORE_FT
aspirin 81 mg oral tablet: 1 tab(s) orally once a day  atorvastatin 40 mg oral tablet: 1 tab(s) orally once a day (at bedtime)  fluticasone 50 mcg/inh nasal spray: 1 spray(s) nasal once a day  pioglitazone 30 mg oral tablet: 1 tab(s) orally once a day  sacubitril-valsartan 24 mg-26 mg oral tablet: 1 tab(s) orally 2 times a day  Toprol-XL 50 mg oral tablet, extended release: 1 tab(s) orally once a day   acetaminophen 325 mg oral tablet: 2 tab(s) orally every 6 hours as needed for Mild Pain (1 - 3)  aspirin 81 mg oral delayed release tablet: 1 tab(s) orally once a day  atorvastatin 40 mg oral tablet: 1 tab(s) orally once a day (at bedtime)  clopidogrel 75 mg oral tablet: 1 tab(s) orally once a day  furosemide 20 mg oral tablet: 1 tab(s) orally once a day  Metoprolol Tartrate 25 mg oral tablet: 0.5 tab(s) orally 2 times a day  oxyCODONE 5 mg oral tablet: 1 tab(s) orally every 6 hours as needed for  severe pain MDD: 4  pioglitazone 30 mg oral tablet: 1 tab(s) orally once a day

## 2023-09-25 NOTE — PROGRESS NOTE ADULT - PROBLEM SELECTOR PLAN 2
Patient euvolemic on exam, lungs clear, no edema  -Echo as above  -BNP: 2513  -c/w daily weights, strict I&O Patient euvolemic on exam, lungs clear, no edema  -Echo as above  -BNP: 2513  -c/w daily weights, strict I&Os, core measures Patient euvolemic on exam, HD stable  - BNP 2513, trop 40  -Echo as above EF 25%  -GDMT: Toprol 50mg qd, Entresto 24/26mg BID  -c/w daily weights, strict I&Os, core measures

## 2023-09-25 NOTE — DISCHARGE NOTE PROVIDER - NSDCFUADDINST_GEN_ALL_CORE_FT
Please continue to follow a heart healthy diet, low in sodium, cholesterol and fats. We recommend a Mediterranean diet:  -Incorporate 2 or more servings per day of vegetables, fruits, and whole grains  -Increase intake of fish and legumes/beans to 2 or more servings per week  -Increase intake of healthy fats, such as olive oil and avocados, and have a handful of nuts/seeds most days  -Reduce red/processed meat consumption to 2 or fewer times per week   Please continue to follow a heart healthy diet, low in sodium, cholesterol and fats. We recommend a Mediterranean diet:  -Incorporate 2 or more servings per day of vegetables, fruits, and whole grains  -Increase intake of fish and legumes/beans to 2 or more servings per week  -Increase intake of healthy fats, such as olive oil and avocados, and have a handful of nuts/seeds most days  -Reduce red/processed meat consumption to 2 or fewer times per week    -Walk daily as tolerated and use your incentive spirometer 10 times every hour while you are awake.     -Please weigh yourself daily. If you notice over a 3 pound weight gain in 3 days, this is a sign you are likely retaining too much fluid. It is imperative you call our right away with unexplained rapid weight gain.      -Please continue to wear the compression stockings given to you in the hospital at home. This is a way to prevent fluid from building up in your legs.     -No driving or strenuous activity/exercise until cleared by your surgeon.    -Gently clean your incisions with unscented/antibacterial soap and water, pat dry.  You may leave them open to air.    -Call your doctor if you have shortness of breath, chest pain not relieved by pain medication, dizziness, fever >100.5, or increased redness or drainage from incisions.

## 2023-09-25 NOTE — DISCHARGE NOTE PROVIDER - NSDCFUSCHEDAPPT_GEN_ALL_CORE_FT
Surya Ramirez  [facility] Physician [facility]  [facility] [address]  Scheduled Appointment: 10/11/2023

## 2023-09-25 NOTE — DISCHARGE NOTE PROVIDER - NSDCHHATTENDCERT_GEN_ALL_CORE
ETY:[birth year]  FOV:No future
Left message informing the patient of needing an appointment
Will refill but due for a visit
My signature below certifies that the above stated patient is homebound and upon completion of the Face-To-Face encounter, has the need for intermittent skilled nursing, physical therapy and/or speech or occupational therapy services in their home for their current diagnosis as outlined in their initial plan of care. These services will continue to be monitored by myself or another physician.

## 2023-09-25 NOTE — DISCHARGE NOTE PROVIDER - PROVIDER TOKENS
PROVIDER:[TOKEN:[[id number]],SCHEDULEDAPPT:[10/11/2023],SCHEDULEDAPPTTIME:[11:00 AM]],PROVIDER:[TOKEN:[[id number]]]

## 2023-09-25 NOTE — CONSULT NOTE ADULT - SUBJECTIVE AND OBJECTIVE BOX
Surgeon: James    Requesting Physician: Kyle    HISTORY OF PRESENT ILLNESS (Need 4):  This is 82 yo F with HTN, HLD, MI (2005), DM, CAD (s/p ISAAC x 3 @ [facility] most recently 2008), VT arrest (intubation and subsequent ICD placement in 2019 by Dr. Sesay), Breast cancer (s/p lumpectomy and chemo, in remission) presented to [facility] on 09/21/23 after her ICD fired x 1 with associated CP and dizziness and elevated trop found to have NSTEMI s/p Cardiac Cath @  on 09/23/23 revealing LM and double vessel CAD and was subsequently transferred to [facility]. Patient continued on heparin gtt.  Patient underwent repeat LHC on 9/25/23 which showed LM distal calcified 80% stenosis, oLAD 80% calcified stenosis, oCLCx 80% calcified stensis, RCA not engaged due to recent LHC.  CT surgery consulted for evaluation for surgical revascularization.    PAST MEDICAL & SURGICAL HISTORY:  Diabetes mellitus      Hypercholesterolemia      Essential hypertension      Hx of breast cancer      CAD (coronary artery disease)  s/p MI & stent x1 in 05, x1 in 06 & x1 in 08 in [facility]      H/O: hysterectomy  2008      Status post coronary artery stent placement  x3, 2005, 2006, 2008      H/O lumpectomy  2001          MEDICATIONS  (STANDING):  aspirin enteric coated 81 milliGRAM(s) Oral daily  atorvastatin 40 milliGRAM(s) Oral at bedtime  clopidogrel Tablet 75 milliGRAM(s) Oral daily  dextrose 5%. 1000 milliLiter(s) (50 mL/Hr) IV Continuous <Continuous>  dextrose 5%. 1000 milliLiter(s) (100 mL/Hr) IV Continuous <Continuous>  dextrose 50% Injectable 12.5 Gram(s) IV Push once  dextrose 50% Injectable 25 Gram(s) IV Push once  dextrose 50% Injectable 25 Gram(s) IV Push once  fluticasone propionate 50 MICROgram(s)/spray Nasal Spray 1 Spray(s) Both Nostrils two times a day  glucagon  Injectable 1 milliGRAM(s) IntraMuscular once  heparin  Infusion.  Unit(s)/Hr (9 mL/Hr) IV Continuous <Continuous>  influenza  Vaccine (HIGH DOSE) 0.7 milliLiter(s) IntraMuscular once  insulin lispro (ADMELOG) corrective regimen sliding scale   SubCutaneous three times a day before meals  insulin lispro (ADMELOG) corrective regimen sliding scale   SubCutaneous at bedtime  metoprolol succinate ER 50 milliGRAM(s) Oral daily  sacubitril 24 mG/valsartan 26 mG 1 Tablet(s) Oral two times a day  sodium chloride 0.9%. 500 milliLiter(s) (30 mL/Hr) IV Continuous <Continuous>    MEDICATIONS  (PRN):  dextrose Oral Gel 15 Gram(s) Oral once PRN Blood Glucose LESS THAN 70 milliGRAM(s)/deciliter  heparin   Injectable 4400 Unit(s) IV Push every 6 hours PRN For aPTT less than 40      Allergies    No Known Allergies    Intolerances        SOCIAL HISTORY:  Smoker:  NO          ETOH use:  NO                Ilicit Drug use:   NO      FAMILY HISTORY:  FH: CAD (coronary artery disease)  brother        Review of Systems (Need 10):  CONSTITUTIONAL: Denies fevers / chills, sweats, fatigue, weight loss, weight gain                                       NEURO:  Denies parathesias, seizures, syncope, confusion                                                                                  EYES:  Denies blurry vision, discharge, pain, loss of vision                                                                                    ENMT:  Denies difficulty hearing, vertigo, dysphagia, epistaxis, recent dental work                                       CV:  Denies chest pain, palpitations, ROGERS, orthopnea                                                                                           RESPIRATORY:  Denies wWheezing, SOB, cough / sputum, hemoptysis                                                               GI:  Denies nausea, vomiting, diarrhea, constipation, melena                                                                          : Denies hematuria, dysuria, urgency, incontinence                                                                                          MUSKULOSKELETAL:  Denies arthritis, joint swelling, muscle weakness                                                             SKIN/BREAST:  Denies rash, itching, hair loss, masses                                                                                              PSYCH:  Denies depression, anxiety, suicidal ideation                                                                                                HEME/LYMPH:  Denies bruises easily, enlarged lymph nodes, tender lymph nodes                                          ENDOCRINE:  Denies cold intolerance, heat intolerance, polydipsia                                                                      Vital Signs Last 24 Hrs  T(C): 36.4 (25 Sep 2023 08:35), Max: 36.7 (24 Sep 2023 20:44)  T(F): 97.6 (25 Sep 2023 08:35), Max: 98 (24 Sep 2023 20:44)  HR: 57 (25 Sep 2023 08:35) (57 - 62)  BP: 135/62 (25 Sep 2023 08:35) (113/58 - 135/68)  BP(mean): 93 (25 Sep 2023 05:30) (84 - 93)  RR: 18 (25 Sep 2023 08:35) (18 - 18)  SpO2: 98% (25 Sep 2023 08:35) (94% - 98%)    Parameters below as of 25 Sep 2023 08:35  Patient On (Oxygen Delivery Method): room air        Physical Exam (Need 8)  GEN: NAD, looks comfortable  Psych: Mood appropriate  Neuro: A&Ox3.  No focal deficits.  Moving all extremities.   HEENT: No obvious abnormalities  CV: S1S2, regular, no murmurs appreciated.  No carotid bruits.  No JVD  Lungs: Clear B/L.  No wheezing, rales or rhonchi  ABD: Soft, non-tender, non-distended.  +Bowel sounds  EXT: Warm and well perfused.  No peripheral edema noted  Musculoskeletal: Moving all extremities with normal ROM, no joint swelling  PV: Pedal pulses palpable    LABS:                        11.2   5.10  )-----------( 193      ( 25 Sep 2023 06:35 )             34.2     09-25    137  |  102  |  12  ----------------------------<  170<H>  4.0   |  27  |  0.76    Ca    9.3      25 Sep 2023 06:35  Mg     1.8     09-25    TPro  6.5  /  Alb  3.6  /  TBili  0.6  /  DBili  x   /  AST  18  /  ALT  31  /  AlkPhos  57  09-25    PT/INR - ( 24 Sep 2023 16:33 )   PT: 12.8 sec;   INR: 1.13          PTT - ( 25 Sep 2023 08:12 )  PTT:50.7 sec  Urinalysis Basic - ( 25 Sep 2023 06:35 )    Color: x / Appearance: x / SG: x / pH: x  Gluc: 170 mg/dL / Ketone: x  / Bili: x / Urobili: x   Blood: x / Protein: x / Nitrite: x   Leuk Esterase: x / RBC: x / WBC x   Sq Epi: x / Non Sq Epi: x / Bacteria: x              RADIOLOGY & ADDITIONAL STUDIES:  CAROTID U/S:pending    EKG: AV block 1st deg, RBBB, TWI V4-V6       TTE / HESHAM: pending    Cardiac Cath: M distal calcified 80% stenosis, oLAD 80% calcified stenosis, oCLCx 80%  calcified stensis.  Surgeon: James    Requesting Physician: Kyle    HISTORY OF PRESENT ILLNESS (Need 4):  84 YO Female w/ PMHx of HTN, HLD, MI (2005), DMII, CAD (s/p ISAAC x 3 @ [facility] most recently 2008), VT arrest (intubation and subsequent ICD placement in 2019 by Dr. Sesay), Breast cancer (s/p lumpectomy and chemo, in remission) who presented to [facility] on 09/21/23 after her ICD fired x 1 with associated CP and dizziness and elevated trop, found to have NSTEMI s/p Cardiac Cath @  on 09/23/23 revealing LM and double vessel CAD and was subsequently transferred to [facility]. Patient continued on heparin gtt and on 9/25 underwent repeat LHC which showed LM distal calcified 80% stenosis, oLAD 80% calcified stenosis, oCLCx 80% calcified stensis, RCA not engaged due to recent LHC.  CT surgery consulted for evaluation for surgical revascularization.  At present, patient admits to some dull aching pain on the L-side of her chest, which has been unchanged.     PAST MEDICAL & SURGICAL HISTORY:  Diabetes mellitus    Hypercholesterolemia    Essential hypertension    Hx of breast cancer    CAD (coronary artery disease)  s/p MI & stent x1 in 05, x1 in 06 & x1 in 08 in [facility]    H/O: hysterectomy  2008    Status post coronary artery stent placement  x3, 2005, 2006, 2008    H/O lumpectomy  2001    MEDICATIONS  (STANDING):  aspirin enteric coated 81 milliGRAM(s) Oral daily  atorvastatin 40 milliGRAM(s) Oral at bedtime  clopidogrel Tablet 75 milliGRAM(s) Oral daily  dextrose 5%. 1000 milliLiter(s) (50 mL/Hr) IV Continuous <Continuous>  dextrose 5%. 1000 milliLiter(s) (100 mL/Hr) IV Continuous <Continuous>  dextrose 50% Injectable 12.5 Gram(s) IV Push once  dextrose 50% Injectable 25 Gram(s) IV Push once  dextrose 50% Injectable 25 Gram(s) IV Push once  fluticasone propionate 50 MICROgram(s)/spray Nasal Spray 1 Spray(s) Both Nostrils two times a day  glucagon  Injectable 1 milliGRAM(s) IntraMuscular once  heparin  Infusion.  Unit(s)/Hr (9 mL/Hr) IV Continuous <Continuous>  influenza  Vaccine (HIGH DOSE) 0.7 milliLiter(s) IntraMuscular once  insulin lispro (ADMELOG) corrective regimen sliding scale   SubCutaneous three times a day before meals  insulin lispro (ADMELOG) corrective regimen sliding scale   SubCutaneous at bedtime  metoprolol succinate ER 50 milliGRAM(s) Oral daily  sacubitril 24 mG/valsartan 26 mG 1 Tablet(s) Oral two times a day  sodium chloride 0.9%. 500 milliLiter(s) (30 mL/Hr) IV Continuous <Continuous>    MEDICATIONS  (PRN):  dextrose Oral Gel 15 Gram(s) Oral once PRN Blood Glucose LESS THAN 70 milliGRAM(s)/deciliter  heparin   Injectable 4400 Unit(s) IV Push every 6 hours PRN For aPTT less than 40    Allergies    No Known Allergies    Intolerances      SOCIAL HISTORY:  Smoker:  NO          ETOH use:  NO                Ilicit Drug use:   NO    FAMILY HISTORY:  FH: CAD (coronary artery disease)  brother    Review of Systems:  CONSTITUTIONAL: Denies fevers / chills, sweats, fatigue, weight loss, weight gain                                       NEURO:  Denies parathesias, seizures, syncope, confusion                                                                                  EYES:  Denies blurry vision, discharge, pain, loss of vision                                                                                    ENMT:  Denies difficulty hearing, vertigo, dysphagia, epistaxis, recent dental work                                       CV:  +chest pain, denies palpitations, ROGERS, orthopnea                                                                                           RESPIRATORY:  Denies wWheezing, SOB, cough / sputum, hemoptysis                                                               GI:  Denies nausea, vomiting, diarrhea, constipation, melena                                                                          : Denies hematuria, dysuria, urgency, incontinence                                                                                          MUSKULOSKELETAL:  Denies arthritis, joint swelling, muscle weakness                                                             SKIN/BREAST:  Denies rash, itching, hair loss, masses                                                                                              PSYCH:  Denies depression, anxiety, suicidal ideation                                                                                                HEME/LYMPH:  Denies bruises easily, enlarged lymph nodes, tender lymph nodes                                          ENDOCRINE:  Denies cold intolerance, heat intolerance, polydipsia                                                                      Vital Signs Last 24 Hrs  T(C): 36.4 (25 Sep 2023 08:35), Max: 36.7 (24 Sep 2023 20:44)  T(F): 97.6 (25 Sep 2023 08:35), Max: 98 (24 Sep 2023 20:44)  HR: 57 (25 Sep 2023 08:35) (57 - 62)  BP: 135/62 (25 Sep 2023 08:35) (113/58 - 135/68)  BP(mean): 93 (25 Sep 2023 05:30) (84 - 93)  RR: 18 (25 Sep 2023 08:35) (18 - 18)  SpO2: 98% (25 Sep 2023 08:35) (94% - 98%)    Parameters below as of 25 Sep 2023 08:35  Patient On (Oxygen Delivery Method): room air    PHYSICAL EXAM:  GENERAL: NAD, lying in bed comfortably  HEAD:  Atraumatic, Normocephalic  EYES: EOMI, PERRLA, conjunctiva and sclera clear  ENT: Moist mucous membranes  NECK: Supple, No JVD  CHEST/LUNG: CTAB  HEART: RRR  ABDOMEN: Soft, Nontender, Nondistended. No hepatomegally  EXTREMITIES:  2+ Peripheral Pulses, brisk capillary refill. No clubbing, cyanosis, or edema  NERVOUS SYSTEM:  Alert & Oriented X3, speech clear. No deficits     LABS:                        11.2   5.10  )-----------( 193      ( 25 Sep 2023 06:35 )             34.2     09-25    137  |  102  |  12  ----------------------------<  170<H>  4.0   |  27  |  0.76    Ca    9.3      25 Sep 2023 06:35  Mg     1.8     09-25    TPro  6.5  /  Alb  3.6  /  TBili  0.6  /  DBili  x   /  AST  18  /  ALT  31  /  AlkPhos  57  09-25    PT/INR - ( 24 Sep 2023 16:33 )   PT: 12.8 sec;   INR: 1.13       PTT - ( 25 Sep 2023 08:12 )  PTT:50.7 sec  Urinalysis Basic - ( 25 Sep 2023 06:35 )    Color: x / Appearance: x / SG: x / pH: x  Gluc: 170 mg/dL / Ketone: x  / Bili: x / Urobili: x   Blood: x / Protein: x / Nitrite: x   Leuk Esterase: x / RBC: x / WBC x   Sq Epi: x / Non Sq Epi: x / Bacteria: x    RADIOLOGY & ADDITIONAL STUDIES:  CAROTID U/S: pending    EKG: AV block 1st deg, RBBB, TWI V4-V6    TTE / HESHAM: pending    Cardiac Cath: M distal calcified 80% stenosis, oLAD 80% calcified stenosis, oCLCx 80%  calcified stensis.

## 2023-09-25 NOTE — CONSULT NOTE ADULT - ASSESSMENT
Assesment:  This is 84 yo F with HTN, HLD, MI (2005), DM, CAD (s/p ISAAC x 3 @ [facility] most recently 2008), VT arrest (intubation and subsequent ICD placement in 2019 by Dr. Sesay), Breast cancer (s/p lumpectomy and chemo, in remission) presented to [facility] on 09/21/23 after her ICD fired x 1 with associated CP and dizziness and elevated trop found to have NSTEMI s/p Cardiac Cath @  on 09/23/23 revealing LM and double vessel CAD and was subsequently transferred to [facility]. Patient continued on heparin gtt.  Patient underwent repeat LHC on 9/25/23 which showed LM distal calcified 80% stenosis, oLAD 80% calcified stenosis, oCLCx 80% calcified stensis, RCA not engaged due to recent LHC.  CT surgery consulted for evaluation for surgical revascularization.    Plan:  Problem 1: Coronary artery disease  Please obtain ECHO, carotid duplex, and PA/Lat Chest Xray  will discuss with Dr. Ramirez regarding suitability for surgical revascularization and timing  Continue heparin gtt per primary team    Problem 2: Hypertension  Continue Toprol    Problem 3:HFrEF  Continue entresto  Will need to hold prior to OR    Problem 4: Hyperlipidemia  Continue statin    I have reviewed clinical labs tests and reports, radiology tests and reports, as well as old patient medical records, and discussed with the referring physician.     Assessment:  82 YO Female w/ PMHx of HTN, HLD, MI (2005), DMII, CAD (s/p ISAAC x 3 @ [facility] most recently 2008), VT arrest (intubation and subsequent ICD placement in 2019 by Dr. Sesay), Breast cancer (s/p lumpectomy and chemo, in remission) who presented to [facility] on 09/21/23 after her ICD fired x 1 with associated CP and dizziness and elevated trop, found to have NSTEMI s/p Cardiac Cath @  on 09/23/23 revealing LM and double vessel CAD and was subsequently transferred to [facility]. Patient continued on heparin gtt and on 9/25 underwent repeat LHC which showed LM distal calcified 80% stenosis, oLAD 80% calcified stenosis, oCLCx 80% calcified stensis, RCA not engaged due to recent LHC.  CT surgery consulted for evaluation for surgical revascularization.    Plan:  Problem 1: Coronary artery disease  -Please obtain ECHO, carotid duplex, and PA/Lat Chest Xray  -Will discuss with Dr. Ramirez regarding suitability for surgical revascularization and timing  -Cont ASA, BB, statin  -Continue heparin gtt per primary team    Problem 2: Hypertension  -Continue Toprol  -Care per primary team    Problem 3:HFrEF  -Continue Entresto  -Will need to hold prior to OR    Problem 4: Hyperlipidemia  -Continue statin  -Care per primary team    I have reviewed clinical labs tests and reports, radiology tests and reports, as well as old patient medical records, and discussed with the referring physician.

## 2023-09-26 ENCOUNTER — TRANSCRIPTION ENCOUNTER (OUTPATIENT)
Age: 83
End: 2023-09-26

## 2023-09-26 DIAGNOSIS — N39.0 URINARY TRACT INFECTION, SITE NOT SPECIFIED: ICD-10-CM

## 2023-09-26 LAB
ANION GAP SERPL CALC-SCNC: 13 MMOL/L — SIGNIFICANT CHANGE UP (ref 5–17)
APPEARANCE UR: ABNORMAL
APTT BLD: 62.3 SEC — HIGH (ref 24.5–35.6)
APTT BLD: 64.3 SEC — HIGH (ref 24.5–35.6)
BACTERIA # UR AUTO: ABNORMAL /HPF
BASOPHILS # BLD AUTO: 0.05 K/UL — SIGNIFICANT CHANGE UP (ref 0–0.2)
BASOPHILS NFR BLD AUTO: 1.1 % — SIGNIFICANT CHANGE UP (ref 0–2)
BILIRUB UR-MCNC: NEGATIVE — SIGNIFICANT CHANGE UP
BLD GP AB SCN SERPL QL: NEGATIVE — SIGNIFICANT CHANGE UP
BLD GP AB SCN SERPL QL: NEGATIVE — SIGNIFICANT CHANGE UP
BUN SERPL-MCNC: 14 MG/DL — SIGNIFICANT CHANGE UP (ref 7–23)
CALCIUM SERPL-MCNC: 9.9 MG/DL — SIGNIFICANT CHANGE UP (ref 8.4–10.5)
CHLORIDE SERPL-SCNC: 101 MMOL/L — SIGNIFICANT CHANGE UP (ref 96–108)
CO2 SERPL-SCNC: 24 MMOL/L — SIGNIFICANT CHANGE UP (ref 22–31)
COLOR SPEC: YELLOW — SIGNIFICANT CHANGE UP
CREAT SERPL-MCNC: 0.74 MG/DL — SIGNIFICANT CHANGE UP (ref 0.5–1.3)
DIFF PNL FLD: NEGATIVE — SIGNIFICANT CHANGE UP
EGFR: 80 ML/MIN/1.73M2 — SIGNIFICANT CHANGE UP
EOSINOPHIL # BLD AUTO: 0.21 K/UL — SIGNIFICANT CHANGE UP (ref 0–0.5)
EOSINOPHIL NFR BLD AUTO: 4.6 % — SIGNIFICANT CHANGE UP (ref 0–6)
EPI CELLS # UR: SIGNIFICANT CHANGE UP /HPF (ref 0–5)
GLUCOSE BLDC GLUCOMTR-MCNC: 158 MG/DL — HIGH (ref 70–99)
GLUCOSE BLDC GLUCOMTR-MCNC: 167 MG/DL — HIGH (ref 70–99)
GLUCOSE BLDC GLUCOMTR-MCNC: 177 MG/DL — HIGH (ref 70–99)
GLUCOSE SERPL-MCNC: 187 MG/DL — HIGH (ref 70–99)
GLUCOSE UR QL: NEGATIVE — SIGNIFICANT CHANGE UP
HCT VFR BLD CALC: 35.1 % — SIGNIFICANT CHANGE UP (ref 34.5–45)
HCT VFR BLD CALC: 37.9 % — SIGNIFICANT CHANGE UP (ref 34.5–45)
HGB BLD-MCNC: 11.6 G/DL — SIGNIFICANT CHANGE UP (ref 11.5–15.5)
HGB BLD-MCNC: 12.7 G/DL — SIGNIFICANT CHANGE UP (ref 11.5–15.5)
IMM GRANULOCYTES NFR BLD AUTO: 0.7 % — SIGNIFICANT CHANGE UP (ref 0–0.9)
KETONES UR-MCNC: NEGATIVE — SIGNIFICANT CHANGE UP
LEUKOCYTE ESTERASE UR-ACNC: ABNORMAL
LYMPHOCYTES # BLD AUTO: 1.73 K/UL — SIGNIFICANT CHANGE UP (ref 1–3.3)
LYMPHOCYTES # BLD AUTO: 37.5 % — SIGNIFICANT CHANGE UP (ref 13–44)
MAGNESIUM SERPL-MCNC: 1.8 MG/DL — SIGNIFICANT CHANGE UP (ref 1.6–2.6)
MCHC RBC-ENTMCNC: 31.4 PG — SIGNIFICANT CHANGE UP (ref 27–34)
MCHC RBC-ENTMCNC: 31.4 PG — SIGNIFICANT CHANGE UP (ref 27–34)
MCHC RBC-ENTMCNC: 33 GM/DL — SIGNIFICANT CHANGE UP (ref 32–36)
MCHC RBC-ENTMCNC: 33.5 GM/DL — SIGNIFICANT CHANGE UP (ref 32–36)
MCV RBC AUTO: 93.6 FL — SIGNIFICANT CHANGE UP (ref 80–100)
MCV RBC AUTO: 95.1 FL — SIGNIFICANT CHANGE UP (ref 80–100)
MONOCYTES # BLD AUTO: 0.46 K/UL — SIGNIFICANT CHANGE UP (ref 0–0.9)
MONOCYTES NFR BLD AUTO: 10 % — SIGNIFICANT CHANGE UP (ref 2–14)
NEUTROPHILS # BLD AUTO: 2.13 K/UL — SIGNIFICANT CHANGE UP (ref 1.8–7.4)
NEUTROPHILS NFR BLD AUTO: 46.1 % — SIGNIFICANT CHANGE UP (ref 43–77)
NITRITE UR-MCNC: POSITIVE
NRBC # BLD: 0 /100 WBCS — SIGNIFICANT CHANGE UP (ref 0–0)
NRBC # BLD: 0 /100 WBCS — SIGNIFICANT CHANGE UP (ref 0–0)
PA ADP PRP-ACNC: 194 PRU — SIGNIFICANT CHANGE UP (ref 194–418)
PH UR: 7 — SIGNIFICANT CHANGE UP (ref 5–8)
PLATELET # BLD AUTO: 208 K/UL — SIGNIFICANT CHANGE UP (ref 150–400)
PLATELET # BLD AUTO: 231 K/UL — SIGNIFICANT CHANGE UP (ref 150–400)
POTASSIUM SERPL-MCNC: 4.4 MMOL/L — SIGNIFICANT CHANGE UP (ref 3.5–5.3)
POTASSIUM SERPL-SCNC: 4.4 MMOL/L — SIGNIFICANT CHANGE UP (ref 3.5–5.3)
PROT UR-MCNC: NEGATIVE MG/DL — SIGNIFICANT CHANGE UP
RBC # BLD: 3.69 M/UL — LOW (ref 3.8–5.2)
RBC # BLD: 4.05 M/UL — SIGNIFICANT CHANGE UP (ref 3.8–5.2)
RBC # FLD: 14.3 % — SIGNIFICANT CHANGE UP (ref 10.3–14.5)
RBC # FLD: 14.3 % — SIGNIFICANT CHANGE UP (ref 10.3–14.5)
RBC CASTS # UR COMP ASSIST: < 5 /HPF — SIGNIFICANT CHANGE UP
RH IG SCN BLD-IMP: POSITIVE — SIGNIFICANT CHANGE UP
RH IG SCN BLD-IMP: POSITIVE — SIGNIFICANT CHANGE UP
SODIUM SERPL-SCNC: 138 MMOL/L — SIGNIFICANT CHANGE UP (ref 135–145)
SP GR SPEC: 1.01 — SIGNIFICANT CHANGE UP (ref 1–1.03)
TSH SERPL-MCNC: 1.98 UIU/ML — SIGNIFICANT CHANGE UP (ref 0.27–4.2)
UROBILINOGEN FLD QL: 1 E.U./DL — SIGNIFICANT CHANGE UP
WBC # BLD: 4.61 K/UL — SIGNIFICANT CHANGE UP (ref 3.8–10.5)
WBC # BLD: 5.55 K/UL — SIGNIFICANT CHANGE UP (ref 3.8–10.5)
WBC # FLD AUTO: 4.61 K/UL — SIGNIFICANT CHANGE UP (ref 3.8–10.5)
WBC # FLD AUTO: 5.55 K/UL — SIGNIFICANT CHANGE UP (ref 3.8–10.5)
WBC UR QL: ABNORMAL /HPF

## 2023-09-26 PROCEDURE — 94010 BREATHING CAPACITY TEST: CPT | Mod: 26

## 2023-09-26 PROCEDURE — 99233 SBSQ HOSP IP/OBS HIGH 50: CPT

## 2023-09-26 RX ORDER — CHLORHEXIDINE GLUCONATE 213 G/1000ML
1 SOLUTION TOPICAL ONCE
Refills: 0 | Status: COMPLETED | OUTPATIENT
Start: 2023-09-26 | End: 2023-09-26

## 2023-09-26 RX ORDER — CHLORHEXIDINE GLUCONATE 213 G/1000ML
15 SOLUTION TOPICAL ONCE
Refills: 0 | Status: COMPLETED | OUTPATIENT
Start: 2023-09-27 | End: 2023-09-27

## 2023-09-26 RX ORDER — DAPAGLIFLOZIN 10 MG/1
10 TABLET, FILM COATED ORAL EVERY 24 HOURS
Refills: 0 | Status: DISCONTINUED | OUTPATIENT
Start: 2023-09-26 | End: 2023-09-27

## 2023-09-26 RX ORDER — ACETAMINOPHEN 500 MG
1000 TABLET ORAL ONCE
Refills: 0 | Status: COMPLETED | OUTPATIENT
Start: 2023-09-26 | End: 2023-09-27

## 2023-09-26 RX ORDER — CHLORHEXIDINE GLUCONATE 213 G/1000ML
1 SOLUTION TOPICAL ONCE
Refills: 0 | Status: COMPLETED | OUTPATIENT
Start: 2023-09-27 | End: 2023-09-27

## 2023-09-26 RX ORDER — ACETAMINOPHEN 500 MG
650 TABLET ORAL EVERY 6 HOURS
Refills: 0 | Status: DISCONTINUED | OUTPATIENT
Start: 2023-09-26 | End: 2023-09-27

## 2023-09-26 RX ORDER — CEFTRIAXONE 500 MG/1
1000 INJECTION, POWDER, FOR SOLUTION INTRAMUSCULAR; INTRAVENOUS EVERY 24 HOURS
Refills: 0 | Status: DISCONTINUED | OUTPATIENT
Start: 2023-09-26 | End: 2023-09-27

## 2023-09-26 RX ORDER — SPIRONOLACTONE 25 MG/1
25 TABLET, FILM COATED ORAL DAILY
Refills: 0 | Status: DISCONTINUED | OUTPATIENT
Start: 2023-09-26 | End: 2023-09-27

## 2023-09-26 RX ORDER — ASCORBIC ACID 60 MG
2000 TABLET,CHEWABLE ORAL ONCE
Refills: 0 | Status: COMPLETED | OUTPATIENT
Start: 2023-09-26 | End: 2023-09-26

## 2023-09-26 RX ORDER — MUPIROCIN 20 MG/G
1 OINTMENT TOPICAL
Refills: 0 | Status: DISCONTINUED | OUTPATIENT
Start: 2023-09-26 | End: 2023-09-27

## 2023-09-26 RX ADMIN — ATORVASTATIN CALCIUM 40 MILLIGRAM(S): 80 TABLET, FILM COATED ORAL at 22:20

## 2023-09-26 RX ADMIN — Medication 1: at 17:14

## 2023-09-26 RX ADMIN — HEPARIN SODIUM 850 UNIT(S)/HR: 5000 INJECTION INTRAVENOUS; SUBCUTANEOUS at 05:30

## 2023-09-26 RX ADMIN — MUPIROCIN 1 APPLICATION(S): 20 OINTMENT TOPICAL at 21:40

## 2023-09-26 RX ADMIN — Medication 81 MILLIGRAM(S): at 11:51

## 2023-09-26 RX ADMIN — DAPAGLIFLOZIN 10 MILLIGRAM(S): 10 TABLET, FILM COATED ORAL at 17:14

## 2023-09-26 RX ADMIN — SACUBITRIL AND VALSARTAN 1 TABLET(S): 24; 26 TABLET, FILM COATED ORAL at 05:31

## 2023-09-26 RX ADMIN — SACUBITRIL AND VALSARTAN 1 TABLET(S): 24; 26 TABLET, FILM COATED ORAL at 17:13

## 2023-09-26 RX ADMIN — Medication 1: at 11:53

## 2023-09-26 RX ADMIN — HEPARIN SODIUM 850 UNIT(S)/HR: 5000 INJECTION INTRAVENOUS; SUBCUTANEOUS at 08:21

## 2023-09-26 RX ADMIN — Medication 650 MILLIGRAM(S): at 14:37

## 2023-09-26 RX ADMIN — Medication 1: at 08:28

## 2023-09-26 RX ADMIN — Medication 2000 MILLIGRAM(S): at 22:20

## 2023-09-26 RX ADMIN — Medication 50 MILLIGRAM(S): at 05:31

## 2023-09-26 RX ADMIN — SPIRONOLACTONE 25 MILLIGRAM(S): 25 TABLET, FILM COATED ORAL at 17:14

## 2023-09-26 RX ADMIN — HEPARIN SODIUM 850 UNIT(S)/HR: 5000 INJECTION INTRAVENOUS; SUBCUTANEOUS at 11:54

## 2023-09-26 RX ADMIN — HEPARIN SODIUM 850 UNIT(S)/HR: 5000 INJECTION INTRAVENOUS; SUBCUTANEOUS at 20:33

## 2023-09-26 RX ADMIN — CHLORHEXIDINE GLUCONATE 1 APPLICATION(S): 213 SOLUTION TOPICAL at 22:20

## 2023-09-26 RX ADMIN — Medication 650 MILLIGRAM(S): at 13:47

## 2023-09-26 RX ADMIN — Medication 1 SPRAY(S): at 05:31

## 2023-09-26 RX ADMIN — CEFTRIAXONE 100 MILLIGRAM(S): 500 INJECTION, POWDER, FOR SOLUTION INTRAMUSCULAR; INTRAVENOUS at 19:50

## 2023-09-26 RX ADMIN — CHLORHEXIDINE GLUCONATE 1 APPLICATION(S): 213 SOLUTION TOPICAL at 19:22

## 2023-09-26 NOTE — PROGRESS NOTE ADULT - SUBJECTIVE AND OBJECTIVE BOX
Interventional Cardiology PA Adult Progress Note    C.C.:     Subjective Assessment:      ROS Negative except as per Subjective and HPI  	  MEDICATIONS:  metoprolol succinate ER 50 milliGRAM(s) Oral daily  sacubitril 24 mG/valsartan 26 mG 1 Tablet(s) Oral two times a day            atorvastatin 40 milliGRAM(s) Oral at bedtime  dextrose 50% Injectable 25 Gram(s) IV Push once  dextrose 50% Injectable 12.5 Gram(s) IV Push once  dextrose 50% Injectable 25 Gram(s) IV Push once  dextrose Oral Gel 15 Gram(s) Oral once PRN  glucagon  Injectable 1 milliGRAM(s) IntraMuscular once  insulin lispro (ADMELOG) corrective regimen sliding scale   SubCutaneous three times a day before meals  insulin lispro (ADMELOG) corrective regimen sliding scale   SubCutaneous at bedtime    aspirin enteric coated 81 milliGRAM(s) Oral daily  clopidogrel Tablet 75 milliGRAM(s) Oral daily  dextrose 5%. 1000 milliLiter(s) IV Continuous <Continuous>  dextrose 5%. 1000 milliLiter(s) IV Continuous <Continuous>  fluticasone propionate 50 MICROgram(s)/spray Nasal Spray 1 Spray(s) Both Nostrils two times a day  heparin   Injectable 4400 Unit(s) IV Push every 6 hours PRN  heparin  Infusion.  Unit(s)/Hr IV Continuous <Continuous>  influenza  Vaccine (HIGH DOSE) 0.7 milliLiter(s) IntraMuscular once  sodium chloride 0.9%. 500 milliLiter(s) IV Continuous <Continuous>      	    [PHYSICAL EXAM:  TELEMETRY:  T(C): 36.4 (09-26-23 @ 09:12), Max: 36.4 (09-25-23 @ 20:36)  HR: 58 (09-26-23 @ 09:12) (58 - 64)  BP: 132/60 (09-26-23 @ 09:12) (116/60 - 132/60)  RR: 18 (09-26-23 @ 09:12) (17 - 18)  SpO2: 96% (09-26-23 @ 09:12) (92% - 98%)  Wt(kg): --  I&O's Summary    25 Sep 2023 07:01  -  26 Sep 2023 07:00  --------------------------------------------------------  IN: 240 mL / OUT: 0 mL / NET: 240 mL    26 Sep 2023 07:01  -  26 Sep 2023 10:46  --------------------------------------------------------  IN: 180 mL / OUT: 0 mL / NET: 180 mL        Javier:  Central/PICC/Mid Line:                                         Appearance: Normal	  HEENT:   Normal oral mucosa, PERRL, EOMI	  Neck: Supple, + JVD/ - JVD; Carotid Bruit   Cardiovascular: Normal S1 S2, No JVD, No murmurs,   Respiratory: Lungs clear to auscultation/Decreased Breath Sounds/No Rales, Rhonchi, Wheezing	  Gastrointestinal:  Soft, Non-tender, + BS	  Skin: No rashes, No ecchymoses, No cyanosis  Extremities: Normal range of motion, No clubbing, cyanosis or edema  Vascular: Peripheral pulses palpable 2+ bilaterally  Neurologic: Non-focal  Psychiatry: A & O x 3, Mood & affect appropriate      	    ECG:  	  RADIOLOGY:   DIAGNOSTIC TESTING:  [ ] Echocardiogram:  [ ]  Catheterization:  [ ] Stress Test:    [ ] HESHAM  OTHER: 	    LABS:	 	  CARDIAC MARKERS:                                  12.7   5.55  )-----------( 231      ( 26 Sep 2023 05:30 )             37.9     09-26    138  |  101  |  14  ----------------------------<  187<H>  4.4   |  24  |  0.74    Ca    9.9      26 Sep 2023 05:30  Mg     1.8     09-26    TPro  6.5  /  Alb  3.6  /  TBili  0.6  /  DBili  x   /  AST  18  /  ALT  31  /  AlkPhos  57  09-25    proBNP:   Lipid Profile:   HgA1c:   TSH:   PT/INR - ( 24 Sep 2023 16:33 )   PT: 12.8 sec;   INR: 1.13          PTT - ( 26 Sep 2023 04:48 )  PTT:62.3 sec    ASSESSMENT/PLAN: 	        DVT ppx:  Dispo:     Interventional Cardiology PA Adult Progress Note    C.C.:   Overnight: NAOE     Subjective Assessment: Patient seen at bedside this morning with daughter present. Patient denies chest pain, SOB, dizziness, palpitations, n/v and diarrhea. She complains of 3/10 headache.       ROS Negative except as per Subjective and HPI  	  MEDICATIONS:  metoprolol succinate ER 50 milliGRAM(s) Oral daily  sacubitril 24 mG/valsartan 26 mG 1 Tablet(s) Oral two times a day      atorvastatin 40 milliGRAM(s) Oral at bedtime  dextrose 50% Injectable 25 Gram(s) IV Push once  dextrose 50% Injectable 12.5 Gram(s) IV Push once  dextrose 50% Injectable 25 Gram(s) IV Push once  dextrose Oral Gel 15 Gram(s) Oral once PRN  glucagon  Injectable 1 milliGRAM(s) IntraMuscular once  insulin lispro (ADMELOG) corrective regimen sliding scale   SubCutaneous three times a day before meals  insulin lispro (ADMELOG) corrective regimen sliding scale   SubCutaneous at bedtime    aspirin enteric coated 81 milliGRAM(s) Oral daily  clopidogrel Tablet 75 milliGRAM(s) Oral daily  dextrose 5%. 1000 milliLiter(s) IV Continuous <Continuous>  dextrose 5%. 1000 milliLiter(s) IV Continuous <Continuous>  fluticasone propionate 50 MICROgram(s)/spray Nasal Spray 1 Spray(s) Both Nostrils two times a day  heparin   Injectable 4400 Unit(s) IV Push every 6 hours PRN  heparin  Infusion.  Unit(s)/Hr IV Continuous <Continuous>  influenza  Vaccine (HIGH DOSE) 0.7 milliLiter(s) IntraMuscular once  sodium chloride 0.9%. 500 milliLiter(s) IV Continuous <Continuous>      	    PHYSICAL EXAM:  TELEMETRY:  T(C): 36.4 (09-26-23 @ 09:12), Max: 36.4 (09-25-23 @ 20:36)  HR: 58 (09-26-23 @ 09:12) (58 - 64)  BP: 132/60 (09-26-23 @ 09:12) (116/60 - 132/60)  RR: 18 (09-26-23 @ 09:12) (17 - 18)  SpO2: 96% (09-26-23 @ 09:12) (92% - 98%)  Wt(kg): --  I&O's Summary    25 Sep 2023 07:01  -  26 Sep 2023 07:00  --------------------------------------------------------  IN: 240 mL / OUT: 0 mL / NET: 240 mL    26 Sep 2023 07:01  -  26 Sep 2023 10:46  --------------------------------------------------------  IN: 180 mL / OUT: 0 mL / NET: 180 mL        Javier:  Central/PICC/Mid Line:                                         Appearance: Normal	  HEENT:   Normal oral mucosa, PERRL, EOMI	  Neck: Supple, + JVD/ - JVD; Carotid Bruit   Cardiovascular: Normal S1 S2, No JVD, No murmurs,   Respiratory: Lungs clear to auscultation, No WRW  Gastrointestinal:  Soft, Non-tender, + BS	  Skin: No rashes, No ecchymoses, No cyanosis  Extremities: Normal range of motion, No clubbing, cyanosis or edema  Vascular: Peripheral pulses palpable 2+ bilaterally  Neurologic: Non-focal  Psychiatry: A & O x 3, Mood & affect appropriate      	    ECG:  	  RADIOLOGY:   DIAGNOSTIC TESTING:  [ ] Echocardiogram:  [ ]  Catheterization:  [ ] Stress Test:    [ ] HESHAM  OTHER: 	    LABS:	 	  CARDIAC MARKERS:                                12.7   5.55  )-----------( 231      ( 26 Sep 2023 05:30 )             37.9     09-26    138  |  101  |  14  ----------------------------<  187<H>  4.4   |  24  |  0.74    Ca    9.9      26 Sep 2023 05:30  Mg     1.8     09-26    TPro  6.5  /  Alb  3.6  /  TBili  0.6  /  DBili  x   /  AST  18  /  ALT  31  /  AlkPhos  57  09-25    proBNP:   Lipid Profile:   HgA1c:   TSH:   PT/INR - ( 24 Sep 2023 16:33 )   PT: 12.8 sec;   INR: 1.13          PTT - ( 26 Sep 2023 04:48 )  PTT:62.3 sec    ASSESSMENT/PLAN: 	        DVT ppx:  Dispo:     Interventional Cardiology PA Adult Progress Note    C.C.:   Overnight: NAOE     Subjective Assessment: Patient seen resting comfortably at bedside this morning with daughter present. Patient denies chest pain, SOB, dizziness, palpitations, n/v and diarrhea. She complains of 3/10 headache.       ROS Negative except as per Subjective and HPI  	  MEDICATIONS:  metoprolol succinate ER 50 milliGRAM(s) Oral daily  sacubitril 24 mG/valsartan 26 mG 1 Tablet(s) Oral two times a day      atorvastatin 40 milliGRAM(s) Oral at bedtime  dextrose 50% Injectable 25 Gram(s) IV Push once  dextrose 50% Injectable 12.5 Gram(s) IV Push once  dextrose 50% Injectable 25 Gram(s) IV Push once  dextrose Oral Gel 15 Gram(s) Oral once PRN  glucagon  Injectable 1 milliGRAM(s) IntraMuscular once  insulin lispro (ADMELOG) corrective regimen sliding scale   SubCutaneous three times a day before meals  insulin lispro (ADMELOG) corrective regimen sliding scale   SubCutaneous at bedtime    aspirin enteric coated 81 milliGRAM(s) Oral daily  clopidogrel Tablet 75 milliGRAM(s) Oral daily  dextrose 5%. 1000 milliLiter(s) IV Continuous <Continuous>  dextrose 5%. 1000 milliLiter(s) IV Continuous <Continuous>  fluticasone propionate 50 MICROgram(s)/spray Nasal Spray 1 Spray(s) Both Nostrils two times a day  heparin   Injectable 4400 Unit(s) IV Push every 6 hours PRN  heparin  Infusion.  Unit(s)/Hr IV Continuous <Continuous>  influenza  Vaccine (HIGH DOSE) 0.7 milliLiter(s) IntraMuscular once  sodium chloride 0.9%. 500 milliLiter(s) IV Continuous <Continuous>      	    PHYSICAL EXAM:  TELEMETRY:  T(C): 36.4 (09-26-23 @ 09:12), Max: 36.4 (09-25-23 @ 20:36)  HR: 58 (09-26-23 @ 09:12) (58 - 64)  BP: 132/60 (09-26-23 @ 09:12) (116/60 - 132/60)  RR: 18 (09-26-23 @ 09:12) (17 - 18)  SpO2: 96% (09-26-23 @ 09:12) (92% - 98%)  Wt(kg): --  I&O's Summary    25 Sep 2023 07:01  -  26 Sep 2023 07:00  --------------------------------------------------------  IN: 240 mL / OUT: 0 mL / NET: 240 mL    26 Sep 2023 07:01  -  26 Sep 2023 10:46  --------------------------------------------------------  IN: 180 mL / OUT: 0 mL / NET: 180 mL        Javier:  Central/PICC/Mid Line:                                         Appearance: Normal	  HEENT:   Normal oral mucosa, PERRL, EOMI	  Neck: - JVD  Cardiovascular: Normal S1 S2, No JVD, No murmurs  Respiratory: Lungs clear to auscultation, No WWR  Gastrointestinal:  Soft, Non-tender, + BS	  Skin: No rashes, No ecchymoses, No cyanosis  Extremities: Normal range of motion, No clubbing, cyanosis or edema  Vascular: Peripheral pulses palpable 2+ bilaterally  Neurologic: No focal deficits   Psychiatry: A & O x 3, mood & affect appropriate      	    ECG:  	  RADIOLOGY:   DIAGNOSTIC TESTING:  [ ] Echocardiogram:  [ ]  Catheterization:  [ ] Stress Test:    [ ] HESHAM  OTHER: 	    LABS:	 	  CARDIAC MARKERS:                                12.7   5.55  )-----------( 231      ( 26 Sep 2023 05:30 )             37.9     09-26    138  |  101  |  14  ----------------------------<  187<H>  4.4   |  24  |  0.74    Ca    9.9      26 Sep 2023 05:30  Mg     1.8     09-26    TPro  6.5  /  Alb  3.6  /  TBili  0.6  /  DBili  x   /  AST  18  /  ALT  31  /  AlkPhos  57  09-25    proBNP:   Lipid Profile:   HgA1c:   TSH:   PT/INR - ( 24 Sep 2023 16:33 )   PT: 12.8 sec;   INR: 1.13          PTT - ( 26 Sep 2023 04:48 )  PTT:62.3 sec    ASSESSMENT/PLAN: 	        DVT ppx:  Dispo:     Interventional Cardiology PA Adult Progress Note    C.C.:   Overnight: NAOE     Subjective Assessment: Patient seen resting comfortably at bedside this morning with daughter present. Patient denies chest pain, SOB, dizziness, palpitations, n/v and diarrhea. She complains of 3/10 headache. Patient is scheduled for CABG tomorrow with Dr. Ramirez.       ROS Negative except as per Subjective and HPI  	  MEDICATIONS:  metoprolol succinate ER 50 milliGRAM(s) Oral daily  sacubitril 24 mG/valsartan 26 mG 1 Tablet(s) Oral two times a day      atorvastatin 40 milliGRAM(s) Oral at bedtime  dextrose 50% Injectable 25 Gram(s) IV Push once  dextrose 50% Injectable 12.5 Gram(s) IV Push once  dextrose 50% Injectable 25 Gram(s) IV Push once  dextrose Oral Gel 15 Gram(s) Oral once PRN  glucagon  Injectable 1 milliGRAM(s) IntraMuscular once  insulin lispro (ADMELOG) corrective regimen sliding scale   SubCutaneous three times a day before meals  insulin lispro (ADMELOG) corrective regimen sliding scale   SubCutaneous at bedtime    aspirin enteric coated 81 milliGRAM(s) Oral daily  clopidogrel Tablet 75 milliGRAM(s) Oral daily  dextrose 5%. 1000 milliLiter(s) IV Continuous <Continuous>  dextrose 5%. 1000 milliLiter(s) IV Continuous <Continuous>  fluticasone propionate 50 MICROgram(s)/spray Nasal Spray 1 Spray(s) Both Nostrils two times a day  heparin   Injectable 4400 Unit(s) IV Push every 6 hours PRN  heparin  Infusion.  Unit(s)/Hr IV Continuous <Continuous>  influenza  Vaccine (HIGH DOSE) 0.7 milliLiter(s) IntraMuscular once  sodium chloride 0.9%. 500 milliLiter(s) IV Continuous <Continuous>      	    PHYSICAL EXAM:  TELEMETRY:  T(C): 36.4 (09-26-23 @ 09:12), Max: 36.4 (09-25-23 @ 20:36)  HR: 58 (09-26-23 @ 09:12) (58 - 64)  BP: 132/60 (09-26-23 @ 09:12) (116/60 - 132/60)  RR: 18 (09-26-23 @ 09:12) (17 - 18)  SpO2: 96% (09-26-23 @ 09:12) (92% - 98%)  Wt(kg): --  I&O's Summary    25 Sep 2023 07:01  -  26 Sep 2023 07:00  --------------------------------------------------------  IN: 240 mL / OUT: 0 mL / NET: 240 mL    26 Sep 2023 07:01  -  26 Sep 2023 10:46  --------------------------------------------------------  IN: 180 mL / OUT: 0 mL / NET: 180 mL        Javier:  Central/PICC/Mid Line:                                         Appearance: Normal	  HEENT:   Normal oral mucosa, PERRL, EOMI	  Neck: - JVD  Cardiovascular: Normal S1 S2, No JVD, No murmurs  Respiratory: Lungs clear to auscultation, No WWR  Gastrointestinal:  Soft, Non-tender, + BS	  Skin: No rashes, No ecchymoses, No cyanosis  Extremities: Normal range of motion, No clubbing, cyanosis or edema  Vascular: Peripheral pulses palpable 2+ bilaterally  Neurologic: No focal deficits   Psychiatry: A & O x 3, mood & affect appropriate      	    ECG:  	  RADIOLOGY:   DIAGNOSTIC TESTING:  [ ] Echocardiogram:  [ ]  Catheterization:  [ ] Stress Test:    [ ] HESHAM  OTHER: 	    LABS:	 	  CARDIAC MARKERS:                                12.7   5.55  )-----------( 231      ( 26 Sep 2023 05:30 )             37.9     09-26    138  |  101  |  14  ----------------------------<  187<H>  4.4   |  24  |  0.74    Ca    9.9      26 Sep 2023 05:30  Mg     1.8     09-26    TPro  6.5  /  Alb  3.6  /  TBili  0.6  /  DBili  x   /  AST  18  /  ALT  31  /  AlkPhos  57  09-25    proBNP:   Lipid Profile:   HgA1c:   TSH:   PT/INR - ( 24 Sep 2023 16:33 )   PT: 12.8 sec;   INR: 1.13          PTT - ( 26 Sep 2023 04:48 )  PTT:62.3 sec    ASSESSMENT/PLAN: 	        DVT ppx:  Dispo:     Interventional Cardiology PA Adult Progress Note    C.C.:   Overnight: NAOE     Subjective Assessment: Patient seen resting comfortably at bedside this morning with daughter present. Patient denies chest pain, SOB, dizziness, palpitations, n/v and diarrhea. She complains of 3/10 headache. Patient is scheduled for CABG tomorrow with Dr. Ramirez.       ROS Negative except as per Subjective and HPI  	  MEDICATIONS:  metoprolol succinate ER 50 milliGRAM(s) Oral daily  sacubitril 24 mG/valsartan 26 mG 1 Tablet(s) Oral two times a day      atorvastatin 40 milliGRAM(s) Oral at bedtime  dextrose 50% Injectable 25 Gram(s) IV Push once  dextrose 50% Injectable 12.5 Gram(s) IV Push once  dextrose 50% Injectable 25 Gram(s) IV Push once  dextrose Oral Gel 15 Gram(s) Oral once PRN  glucagon  Injectable 1 milliGRAM(s) IntraMuscular once  insulin lispro (ADMELOG) corrective regimen sliding scale   SubCutaneous three times a day before meals  insulin lispro (ADMELOG) corrective regimen sliding scale   SubCutaneous at bedtime    aspirin enteric coated 81 milliGRAM(s) Oral daily  clopidogrel Tablet 75 milliGRAM(s) Oral daily  dextrose 5%. 1000 milliLiter(s) IV Continuous <Continuous>  dextrose 5%. 1000 milliLiter(s) IV Continuous <Continuous>  fluticasone propionate 50 MICROgram(s)/spray Nasal Spray 1 Spray(s) Both Nostrils two times a day  heparin   Injectable 4400 Unit(s) IV Push every 6 hours PRN  heparin  Infusion.  Unit(s)/Hr IV Continuous <Continuous>  influenza  Vaccine (HIGH DOSE) 0.7 milliLiter(s) IntraMuscular once  sodium chloride 0.9%. 500 milliLiter(s) IV Continuous <Continuous>      	    PHYSICAL EXAM:  TELEMETRY:  T(C): 36.4 (09-26-23 @ 09:12), Max: 36.4 (09-25-23 @ 20:36)  HR: 58 (09-26-23 @ 09:12) (58 - 64)  BP: 132/60 (09-26-23 @ 09:12) (116/60 - 132/60)  RR: 18 (09-26-23 @ 09:12) (17 - 18)  SpO2: 96% (09-26-23 @ 09:12) (92% - 98%)  Wt(kg): --  I&O's Summary    25 Sep 2023 07:01  -  26 Sep 2023 07:00  --------------------------------------------------------  IN: 240 mL / OUT: 0 mL / NET: 240 mL    26 Sep 2023 07:01  -  26 Sep 2023 10:46  --------------------------------------------------------  IN: 180 mL / OUT: 0 mL / NET: 180 mL        Javier:  Central/PICC/Mid Line:                                         Appearance: Normal	  HEENT:   Normal oral mucosa, PERRL, EOMI	  Neck: - JVD  Cardiovascular: Normal S1 S2, No JVD, No murmurs  Respiratory: Lungs clear to auscultation, No RRW  Gastrointestinal:  Soft, Non-tender, + BS	  Skin: No rashes, No ecchymoses, No cyanosis  Extremities: Normal range of motion, No clubbing, cyanosis or edema  Vascular: Peripheral pulses palpable 2+ bilaterally  Neurologic: No focal deficits   Psychiatry: A & O x 3, mood & affect appropriate      	    ECG:  	  RADIOLOGY:   DIAGNOSTIC TESTING:  [ ] Echocardiogram:  [ ]  Catheterization:  [ ] Stress Test:    [ ] HESHAM  OTHER: 	    LABS:	 	  CARDIAC MARKERS:                                12.7   5.55  )-----------( 231      ( 26 Sep 2023 05:30 )             37.9     09-26    138  |  101  |  14  ----------------------------<  187<H>  4.4   |  24  |  0.74    Ca    9.9      26 Sep 2023 05:30  Mg     1.8     09-26    TPro  6.5  /  Alb  3.6  /  TBili  0.6  /  DBili  x   /  AST  18  /  ALT  31  /  AlkPhos  57  09-25    proBNP:   Lipid Profile:   HgA1c:   TSH:   PT/INR - ( 24 Sep 2023 16:33 )   PT: 12.8 sec;   INR: 1.13          PTT - ( 26 Sep 2023 04:48 )  PTT:62.3 sec    ASSESSMENT/PLAN: 	        DVT ppx:  Dispo:     Interventional Cardiology PA Adult Progress Note    C.C.:   Overnight: NAOE     Subjective Assessment: Patient seen resting comfortably at bedside this morning with daughter present. Patient denies chest pain, SOB, dizziness, palpitations, n/v and diarrhea. She complains of 3/10 headache.     ROS Negative except as per Subjective and HPI  	  MEDICATIONS:  metoprolol succinate ER 50 milliGRAM(s) Oral daily  sacubitril 24 mG/valsartan 26 mG 1 Tablet(s) Oral two times a day      atorvastatin 40 milliGRAM(s) Oral at bedtime  dextrose 50% Injectable 25 Gram(s) IV Push once  dextrose 50% Injectable 12.5 Gram(s) IV Push once  dextrose 50% Injectable 25 Gram(s) IV Push once  dextrose Oral Gel 15 Gram(s) Oral once PRN  glucagon  Injectable 1 milliGRAM(s) IntraMuscular once  insulin lispro (ADMELOG) corrective regimen sliding scale   SubCutaneous three times a day before meals  insulin lispro (ADMELOG) corrective regimen sliding scale   SubCutaneous at bedtime    aspirin enteric coated 81 milliGRAM(s) Oral daily  clopidogrel Tablet 75 milliGRAM(s) Oral daily  dextrose 5%. 1000 milliLiter(s) IV Continuous <Continuous>  dextrose 5%. 1000 milliLiter(s) IV Continuous <Continuous>  fluticasone propionate 50 MICROgram(s)/spray Nasal Spray 1 Spray(s) Both Nostrils two times a day  heparin   Injectable 4400 Unit(s) IV Push every 6 hours PRN  heparin  Infusion.  Unit(s)/Hr IV Continuous <Continuous>  influenza  Vaccine (HIGH DOSE) 0.7 milliLiter(s) IntraMuscular once  sodium chloride 0.9%. 500 milliLiter(s) IV Continuous <Continuous>      	    PHYSICAL EXAM:  TELEMETRY:  T(C): 36.4 (09-26-23 @ 09:12), Max: 36.4 (09-25-23 @ 20:36)  HR: 58 (09-26-23 @ 09:12) (58 - 64)  BP: 132/60 (09-26-23 @ 09:12) (116/60 - 132/60)  RR: 18 (09-26-23 @ 09:12) (17 - 18)  SpO2: 96% (09-26-23 @ 09:12) (92% - 98%)  Wt(kg): --  I&O's Summary    25 Sep 2023 07:01  -  26 Sep 2023 07:00  --------------------------------------------------------  IN: 240 mL / OUT: 0 mL / NET: 240 mL    26 Sep 2023 07:01  -  26 Sep 2023 10:46  --------------------------------------------------------  IN: 180 mL / OUT: 0 mL / NET: 180 mL        Javier:  Central/PICC/Mid Line:                                         Appearance: Normal	  HEENT:   Normal oral mucosa, PERRL, EOMI	  Neck: - JVD  Cardiovascular: Normal S1 S2, No JVD, No murmurs  Respiratory: Lungs clear to auscultation, No RRW  Gastrointestinal:  Soft, Non-tender, + BS	  Skin: No rashes, No ecchymoses, No cyanosis  Extremities: Normal range of motion, No clubbing, cyanosis or edema  Vascular: Extremities warm  Neurologic: No focal deficits   Psychiatry: A & O x 3, mood & affect appropriate      	    ECG:  	  RADIOLOGY:   DIAGNOSTIC TESTING:  [ ] Echocardiogram:  [ ]  Catheterization:  [ ] Stress Test:    [ ] HESHAM  OTHER: 	    LABS:	 	  CARDIAC MARKERS:                                12.7   5.55  )-----------( 231      ( 26 Sep 2023 05:30 )             37.9     09-26    138  |  101  |  14  ----------------------------<  187<H>  4.4   |  24  |  0.74    Ca    9.9      26 Sep 2023 05:30  Mg     1.8     09-26    TPro  6.5  /  Alb  3.6  /  TBili  0.6  /  DBili  x   /  AST  18  /  ALT  31  /  AlkPhos  57  09-25    proBNP:   Lipid Profile:   HgA1c:   TSH:   PT/INR - ( 24 Sep 2023 16:33 )   PT: 12.8 sec;   INR: 1.13          PTT - ( 26 Sep 2023 04:48 )  PTT:62.3 sec    ASSESSMENT/PLAN: 	        DVT ppx:  Dispo:     Interventional Cardiology PA Adult Progress Note  ****TRANSFER TO CT SURGERY****  C.C.:   Overnight: NAOE     Subjective Assessment: Patient seen resting comfortably at bedside this morning with daughter present. Patient denies chest pain, SOB, dizziness, palpitations, n/v and diarrhea. She complains of 3/10 headache.     ROS Negative except as per Subjective and HPI  	  MEDICATIONS:  metoprolol succinate ER 50 milliGRAM(s) Oral daily  sacubitril 24 mG/valsartan 26 mG 1 Tablet(s) Oral two times a day      atorvastatin 40 milliGRAM(s) Oral at bedtime  dextrose 50% Injectable 25 Gram(s) IV Push once  dextrose 50% Injectable 12.5 Gram(s) IV Push once  dextrose 50% Injectable 25 Gram(s) IV Push once  dextrose Oral Gel 15 Gram(s) Oral once PRN  glucagon  Injectable 1 milliGRAM(s) IntraMuscular once  insulin lispro (ADMELOG) corrective regimen sliding scale   SubCutaneous three times a day before meals  insulin lispro (ADMELOG) corrective regimen sliding scale   SubCutaneous at bedtime    aspirin enteric coated 81 milliGRAM(s) Oral daily  clopidogrel Tablet 75 milliGRAM(s) Oral daily  dextrose 5%. 1000 milliLiter(s) IV Continuous <Continuous>  dextrose 5%. 1000 milliLiter(s) IV Continuous <Continuous>  fluticasone propionate 50 MICROgram(s)/spray Nasal Spray 1 Spray(s) Both Nostrils two times a day  heparin   Injectable 4400 Unit(s) IV Push every 6 hours PRN  heparin  Infusion.  Unit(s)/Hr IV Continuous <Continuous>  influenza  Vaccine (HIGH DOSE) 0.7 milliLiter(s) IntraMuscular once  sodium chloride 0.9%. 500 milliLiter(s) IV Continuous <Continuous>      	    PHYSICAL EXAM:  TELEMETRY:  T(C): 36.4 (09-26-23 @ 09:12), Max: 36.4 (09-25-23 @ 20:36)  HR: 58 (09-26-23 @ 09:12) (58 - 64)  BP: 132/60 (09-26-23 @ 09:12) (116/60 - 132/60)  RR: 18 (09-26-23 @ 09:12) (17 - 18)  SpO2: 96% (09-26-23 @ 09:12) (92% - 98%)  Wt(kg): --  I&O's Summary    25 Sep 2023 07:01  -  26 Sep 2023 07:00  --------------------------------------------------------  IN: 240 mL / OUT: 0 mL / NET: 240 mL    26 Sep 2023 07:01  -  26 Sep 2023 10:46  --------------------------------------------------------  IN: 180 mL / OUT: 0 mL / NET: 180 mL        Javier:  Central/PICC/Mid Line:                                         Appearance: Normal	  HEENT:   Normal oral mucosa, PERRL, EOMI	  Neck: - JVD  Cardiovascular: Normal S1 S2, No JVD, No murmurs  Respiratory: Lungs clear to auscultation, No RRW  Gastrointestinal:  Soft, Non-tender, + BS	  Skin: No rashes, No ecchymoses, No cyanosis  Extremities: Normal range of motion, No clubbing, cyanosis or edema  Vascular: Extremities warm  Neurologic: No focal deficits   Psychiatry: A & O x 3, mood & affect appropriate      	      LABS:	 	  CARDIAC MARKERS:                                12.7   5.55  )-----------( 231      ( 26 Sep 2023 05:30 )             37.9     09-26    138  |  101  |  14  ----------------------------<  187<H>  4.4   |  24  |  0.74    Ca    9.9      26 Sep 2023 05:30  Mg     1.8     09-26    TPro  6.5  /  Alb  3.6  /  TBili  0.6  /  DBili  x   /  AST  18  /  ALT  31  /  AlkPhos  57  09-25    proBNP:   Lipid Profile:   HgA1c:   TSH:   PT/INR - ( 24 Sep 2023 16:33 )   PT: 12.8 sec;   INR: 1.13          PTT - ( 26 Sep 2023 04:48 )  PTT:62.3 sec    ASSESSMENT/PLAN: 	        DVT ppx:  Dispo:     Interventional Cardiology PA Adult Progress Note    C.C.:   Overnight: NAOE     Subjective Assessment: Patient seen resting comfortably at bedside this morning with daughter present. Patient denies chest pain, SOB, dizziness, palpitations, n/v and diarrhea. She complains of 3/10 headache.     ROS Negative except as per Subjective and HPI  	  MEDICATIONS:  metoprolol succinate ER 50 milliGRAM(s) Oral daily  sacubitril 24 mG/valsartan 26 mG 1 Tablet(s) Oral two times a day      atorvastatin 40 milliGRAM(s) Oral at bedtime  dextrose 50% Injectable 25 Gram(s) IV Push once  dextrose 50% Injectable 12.5 Gram(s) IV Push once  dextrose 50% Injectable 25 Gram(s) IV Push once  dextrose Oral Gel 15 Gram(s) Oral once PRN  glucagon  Injectable 1 milliGRAM(s) IntraMuscular once  insulin lispro (ADMELOG) corrective regimen sliding scale   SubCutaneous three times a day before meals  insulin lispro (ADMELOG) corrective regimen sliding scale   SubCutaneous at bedtime    aspirin enteric coated 81 milliGRAM(s) Oral daily  clopidogrel Tablet 75 milliGRAM(s) Oral daily  dextrose 5%. 1000 milliLiter(s) IV Continuous <Continuous>  dextrose 5%. 1000 milliLiter(s) IV Continuous <Continuous>  fluticasone propionate 50 MICROgram(s)/spray Nasal Spray 1 Spray(s) Both Nostrils two times a day  heparin   Injectable 4400 Unit(s) IV Push every 6 hours PRN  heparin  Infusion.  Unit(s)/Hr IV Continuous <Continuous>  influenza  Vaccine (HIGH DOSE) 0.7 milliLiter(s) IntraMuscular once  sodium chloride 0.9%. 500 milliLiter(s) IV Continuous <Continuous>      	    PHYSICAL EXAM:  TELEMETRY:  T(C): 36.4 (09-26-23 @ 09:12), Max: 36.4 (09-25-23 @ 20:36)  HR: 58 (09-26-23 @ 09:12) (58 - 64)  BP: 132/60 (09-26-23 @ 09:12) (116/60 - 132/60)  RR: 18 (09-26-23 @ 09:12) (17 - 18)  SpO2: 96% (09-26-23 @ 09:12) (92% - 98%)  Wt(kg): --  I&O's Summary    25 Sep 2023 07:01  -  26 Sep 2023 07:00  --------------------------------------------------------  IN: 240 mL / OUT: 0 mL / NET: 240 mL    26 Sep 2023 07:01  -  26 Sep 2023 10:46  --------------------------------------------------------  IN: 180 mL / OUT: 0 mL / NET: 180 mL        Javier:  Central/PICC/Mid Line:                                         Appearance: Normal	  HEENT:   Normal oral mucosa, PERRL, EOMI	  Neck: - JVD  Cardiovascular: Normal S1 S2, No JVD, No murmurs  Respiratory: Lungs clear to auscultation, No RRW  Gastrointestinal:  Soft, Non-tender, + BS	  Skin: No rashes, No ecchymoses, No cyanosis  Extremities: Normal range of motion, No clubbing, cyanosis or edema  Vascular: Extremities warm  Neurologic: No focal deficits   Psychiatry: A & O x 3, mood & affect appropriate      	      LABS:	 	  CARDIAC MARKERS:                                12.7   5.55  )-----------( 231      ( 26 Sep 2023 05:30 )             37.9     09-26    138  |  101  |  14  ----------------------------<  187<H>  4.4   |  24  |  0.74    Ca    9.9      26 Sep 2023 05:30  Mg     1.8     09-26    TPro  6.5  /  Alb  3.6  /  TBili  0.6  /  DBili  x   /  AST  18  /  ALT  31  /  AlkPhos  57  09-25    proBNP:   Lipid Profile:   HgA1c:   TSH:   PT/INR - ( 24 Sep 2023 16:33 )   PT: 12.8 sec;   INR: 1.13          PTT - ( 26 Sep 2023 04:48 )  PTT:62.3 sec    ASSESSMENT/PLAN: 	        DVT ppx:  Dispo:

## 2023-09-26 NOTE — DIETITIAN INITIAL EVALUATION ADULT - PROBLEM SELECTOR PLAN 1
Patient is now chest pain free  -Echo (09/21/23) @ : EF 25%, Grade II DD, LA dilated, mod MR, mild AR, small pericardial effusion.  -EKG  AV block 1st deg, RBBB, TWI V4-V6  -Trop I @ : 0.30>0.18>0.31  -Trop @ [facility] 6pm: 40  -Cath (04/27/19):  patent ISAAC pLAD (40%), mLAD non-obs CAD, dLM 20%, LCx MLI, patent ISAAC RCA, EDP 14 mmHg.  -Cath (09/24/23) @: LM and double vessel CAD   -c/w Heparin gtt  -PLAN: Cath tomorrow, email to schedulers sent, consent in binder, 30cc/hr x 8 hr fluids ordered

## 2023-09-26 NOTE — DIETITIAN INITIAL EVALUATION ADULT - PROBLEM SELECTOR PLAN 2
Patient euvolemic on exam, lungs clear, no edema  -Echo as above  -BNP: 2513  -c/w daily weights, strict I&O

## 2023-09-26 NOTE — PROGRESS NOTE ADULT - PROBLEM SELECTOR PLAN 5
On metformin at home, A1c: 6.8  -c/w ISS      F: None  E: Replete if K<4 or Mag<2  N: DASH Diet  Dispo: Pending cardiac cath     Case discussed with Dr. Galeana On metformin at home, A1c: 6.8  -c/w ISS      F: None  E: Replete if K<4 or Mag<2  N: DASH Diet  Dispo: Pending CABG    Case discussed with Dr. Galeana

## 2023-09-26 NOTE — DIETITIAN INITIAL EVALUATION ADULT - PERTINENT LABORATORY DATA
09-26    138  |  101  |  14  ----------------------------<  187<H>  4.4   |  24  |  0.74    Ca    9.9      26 Sep 2023 05:30  Mg     1.8     09-26    TPro  6.5  /  Alb  3.6  /  TBili  0.6  /  DBili  x   /  AST  18  /  ALT  31  /  AlkPhos  57  09-25  POCT Blood Glucose.: 158 mg/dL (09-26-23 @ 16:34)  A1C with Estimated Average Glucose Result: 6.8 % (09-25-23 @ 06:35)  A1C with Estimated Average Glucose Result: 7.1 % (09-24-23 @ 16:27)

## 2023-09-26 NOTE — PROGRESS NOTE ADULT - SUBJECTIVE AND OBJECTIVE BOX
Planned Date of Surgery: 9/27                                                                                                           Surgeon/Attending MD: James    Procedure: CABG    Subjective: pt feeling well, denies CP or SOB    HPI:    84 YO Female w/ PMHx of HTN, HLD, MI (2005), DMII, CAD (s/p ISAAC x 3 @ [facility] most recently 2008), VT arrest (intubation and subsequent ICD placement in 2019 by Dr. Sesay), Breast cancer (s/p lumpectomy and chemo, in remission) who presented to [facility] on 09/21/23 after her ICD fired x 1 with associated CP and dizziness and elevated trop, found to have NSTEMI s/p Cardiac Cath @  on 09/23/23 revealing LM and double vessel CAD and was subsequently transferred to [facility]. Patient continued on heparin gtt and on 9/25 underwent repeat LHC which showed LM distal calcified 80% stenosis, oLAD 80% calcified stenosis, oCLCx 80% calcified stensis, RCA not engaged due to recent LHC.  Pt scheduled for OPCAB tomorrow with Dr. Raimrez.       PAST MEDICAL & SURGICAL HISTORY:  Diabetes mellitus      Hypercholesterolemia      Essential hypertension      Hx of breast cancer      CAD (coronary artery disease)  s/p MI & stent x1 in 05, x1 in 06 & x1 in 08 in [facility]      H/O: hysterectomy  2008      Status post coronary artery stent placement  x3, 2005, 2006, 2008      H/O lumpectomy  2001          No Known Allergies      Vitals:  T(C): 36.1 (09-26-23 @ 11:49), Max: 36.4 (09-25-23 @ 20:36)  HR: 56 (09-26-23 @ 11:49) (56 - 64)  BP: 117/66 (09-26-23 @ 11:49) (116/60 - 132/60)  RR: 18 (09-26-23 @ 11:49) (17 - 18)  SpO2: 97% (09-26-23 @ 11:49) (92% - 98%)    Physical Exam  General:  Neuro:  CV:  Pulmonary:  GI:  Extremeties:    MEDICATIONS  (STANDING):  acetaminophen     Tablet .. 1000 milliGRAM(s) Oral once  ascorbic acid 2000 milliGRAM(s) Oral once  aspirin enteric coated 81 milliGRAM(s) Oral daily  atorvastatin 40 milliGRAM(s) Oral at bedtime  chlorhexidine 4% Liquid 1 Application(s) Topical once  chlorhexidine 4% Liquid 1 Application(s) Topical once  clopidogrel Tablet 75 milliGRAM(s) Oral daily  dextrose 5%. 1000 milliLiter(s) (100 mL/Hr) IV Continuous <Continuous>  dextrose 5%. 1000 milliLiter(s) (50 mL/Hr) IV Continuous <Continuous>  dextrose 50% Injectable 12.5 Gram(s) IV Push once  dextrose 50% Injectable 25 Gram(s) IV Push once  dextrose 50% Injectable 25 Gram(s) IV Push once  fluticasone propionate 50 MICROgram(s)/spray Nasal Spray 1 Spray(s) Both Nostrils two times a day  glucagon  Injectable 1 milliGRAM(s) IntraMuscular once  heparin  Infusion.  Unit(s)/Hr (9 mL/Hr) IV Continuous <Continuous>  influenza  Vaccine (HIGH DOSE) 0.7 milliLiter(s) IntraMuscular once  insulin lispro (ADMELOG) corrective regimen sliding scale   SubCutaneous at bedtime  insulin lispro (ADMELOG) corrective regimen sliding scale   SubCutaneous three times a day before meals  metoprolol succinate ER 50 milliGRAM(s) Oral daily  mupirocin 2% Ointment 1 Application(s) Both Nostrils two times a day  sacubitril 24 mG/valsartan 26 mG 1 Tablet(s) Oral two times a day  sodium chloride 0.9%. 500 milliLiter(s) (30 mL/Hr) IV Continuous <Continuous>    MEDICATIONS  (PRN):  dextrose Oral Gel 15 Gram(s) Oral once PRN Blood Glucose LESS THAN 70 milliGRAM(s)/deciliter  heparin   Injectable 4400 Unit(s) IV Push every 6 hours PRN For aPTT less than 40      On Beta Blocker? YES     Labs:                        12.7   5.55  )-----------( 231      ( 26 Sep 2023 05:30 )             37.9     09-26    138  |  101  |  14  ----------------------------<  187<H>  4.4   |  24  |  0.74    Ca    9.9      26 Sep 2023 05:30  Mg     1.8     09-26    TPro  6.5  /  Alb  3.6  /  TBili  0.6  /  DBili  x   /  AST  18  /  ALT  31  /  AlkPhos  57  09-25    PT/INR - ( 24 Sep 2023 16:33 )   PT: 12.8 sec;   INR: 1.13          PTT - ( 26 Sep 2023 11:06 )  PTT:64.3 sec  Urinalysis Basic - ( 26 Sep 2023 05:30 )    Color: x / Appearance: x / SG: x / pH: x  Gluc: 187 mg/dL / Ketone: x  / Bili: x / Urobili: x   Blood: x / Protein: x / Nitrite: x   Leuk Esterase: x / RBC: x / WBC x   Sq Epi: x / Non Sq Epi: x / Bacteria: x                Preoperative Checklist: (x = completed, n/a = not applicable, p = pending)  [ x ] ECHO  [ x ] CXR  [ x ] Carotid Duplex  [ na ] CT imaging  [ pending ] PFTs  [ pending ] UA  [ x ] Baseline Labs (CMP, CBC w/ diff, PTT, PT/INR)  [ x ] A1c  [ pending ] TSH  [ x ] Lipid panel  [ x ] Cardiac enzymes  [ x ] Pro BNP  [ x ] EKG   [ pending ] T&S 1  [pending  ] T&S 2 (within 72 hours)  [ na ] COVID PCR (within 72 hours)  [ na ] Room air ABG  [ pending ] P2Y12  [ na ] bHCG  [ x ] Consents  [ x ] Pre-op Orders Placed  [ x ] Blood Products Ordered  [x ] NPO at midnight  [ pending ] Conduit tested    Abnormal/Noteworthy Preoperative Testing Results:

## 2023-09-26 NOTE — PROGRESS NOTE ADULT - ASSESSMENT
84yo F with PMHx HTN, HLD, MI (2005), DM, CAD (s/p ISAAC x 3 @ [facility] most recently 2008), VT arrest (intubation and subsequent ICD placement in 2019 by Dr. Sesay), breast cancer (s/p lumpectomy and chemo - in remission) who initially presented to [facility] on 09/21/23 after her ICD fired x 1 with associated CP and dizziness and elevated trop, r/i NSTEMI now s/p cardiac cath @  on 09/23/23 revealing LM and double vessel CAD and was subsequently transferred to [facility] on 09/24/23 for high risk cardiac cath with Dr. Wright. Pt is now s/p cardiac cath on 09/25/23 with severe stenosis LM, CTS consulted for evaluation for surgical revascularization.   82yo F with PMHx HTN, HLD, MI (2005), DM, CAD (s/p ISAAC x 3 @ [facility] most recently 2008), VT arrest (intubation and subsequent ICD placement in 2019 by Dr. Sesay), breast cancer (s/p lumpectomy and chemo - in remission) who initially presented to [facility] on 09/21/23 after her ICD fired x 1 with associated CP and dizziness and elevated trop, r/i NSTEMI now s/p cardiac cath @  on 09/23/23 revealing LM and double vessel CAD and was subsequently transferred to [facility] on 09/24/23 for high risk cardiac cath with Dr. Wright. Pt is now s/p cardiac cath on 09/25/23 with severe stenosis LM, CTS consulted for evaluation for surgical revascularization and plan for CABG tomorrow 9/27/23 with Dr. Ramirez.    84yo F with PMHx HTN, HLD, MI (2005), DM, CAD (s/p ISAAC x 3 @ [facility] most recently 2008), VT arrest (intubation and subsequent ICD placement in 2019 by Dr. Sesay), breast cancer (s/p lumpectomy and chemo - in remission) who initially presented to [facility] on 09/21/23 after her ICD fired x 1 with associated CP and dizziness and elevated trop, r/i NSTEMI now s/p cardiac cath @  on 09/23/23 revealing LM and double vessel CAD and was subsequently transferred to [facility] on 09/24/23 for high risk cardiac cath with Dr. Wright. Pt is now s/p cardiac cath on 09/25/23 with severe stenosis LM, CTS consulted for evaluation for surgical revascularization and plan for CABG today 9/27/23 with Dr. Ramirez. Pt will be transferred to [facility] post procedure.   82yo F with PMHx HTN, HLD, MI (2005), DM, CAD (s/p ISAAC x 3 @ [facility] most recently 2008), VT arrest (intubation and subsequent ICD placement in 2019 by Dr. Sesay), breast cancer (s/p lumpectomy and chemo - in remission) who initially presented to [facility] on 09/21/23 after her ICD fired x 1 with associated CP and dizziness and elevated trop, r/i NSTEMI now s/p cardiac cath @  on 09/23/23 revealing LM and double vessel CAD and was subsequently transferred to [facility] on 09/24/23 for high risk cardiac cath with Dr. Wright. Pt is now s/p cardiac cath on 09/25/23 with severe stenosis LM, CTS consulted for evaluation for surgical revascularization and plan for CABG tomorrow 9/27/23 with Dr. Ramirez.

## 2023-09-26 NOTE — DIETITIAN INITIAL EVALUATION ADULT - NS FNS DIET ORDER
Diet, NPO after Midnight:      NPO Start Date: 26-Sep-2023,   NPO Start Time: 23:59  Except Medications     Special Instructions for Nursing:  Except Medications (09-26-23 @ 12:31)

## 2023-09-26 NOTE — PROGRESS NOTE ADULT - PROBLEM SELECTOR PLAN 1
Presented to  w CP, cath w LM and double vessel CAD. Currently CP free, HD stable  -Echo 09/21/23 @ : EF 25%, Grade II DD, LA dilated, mod MR, mild AR, small pericardial effusion.  -EKG AV block 1st deg, RBBB, TWI V4-V6  -Trop I @ : 0.30>0.18>0.31, Trop @ [facility] 40  -Cardiac cath 9/24/23 @: LM and double vessel CAD   -s/p cardiac cath 9/25/23: LM distal calcified 80% stenosis, oLAD 80% calcified stenosis, oCLCx 80%  calcified stensis. IVUS LM 3.7-4.4 MCA.   - CTS consult () regarding hybrid procedure vs CABG  -C/w ASA/Plavix/statin Presented to  w CP, cath w LM and double vessel CAD. Currently CP free, HD stable  -Echo 09/21/23 @ : EF 25%, Grade II DD, LA dilated, mod MR, mild AR, small pericardial effusion.  -EKG AV block 1st deg, RBBB, TWI V4-V6  -Trop I @ : 0.30>0.18>0.31, Trop @ [facility] 40  -Cardiac cath 9/24/23 @: LM and double vessel CAD   -s/p cardiac cath 9/25/23: LM distal calcified 80% stenosis, oLAD 80% calcified stenosis, oCLCx 80%  calcified stensis. IVUS LM 3.7-4.4 MCA.   - CTS consult () - CABG scheduled tomorrow   -C/w ASA/Plavix/statin Presented to  w CP, cath w LM and double vessel CAD. Currently CP free, HD stable  -Echo 09/21/23 @ : EF 25%, Grade II DD, LA dilated, mod MR, mild AR, small pericardial effusion.  -EKG AV block 1st deg, RBBB, TWI V4-V6  -Trop I @ : 0.30>0.18>0.31, Trop @ [facility] 40  -Cardiac cath 9/24/23 @: LM and double vessel CAD   -s/p cardiac cath 9/25/23: LM distal calcified 80% stenosis, oLAD 80% calcified stenosis, oCLCx 80%  calcified stensis. IVUS LM 3.7-4.4 MCA.   - CTS consult () - CABG scheduled tomorrow   -C/w ASA/statin   - Hold Plavix pending CABG tomorrow Presented to  w CP, cath w LM and double vessel CAD. Currently CP free, HD stable  -Echo 09/21/23 @ : EF 25%, Grade II DD, LA dilated, mod MR, mild AR, small pericardial effusion.  -EKG AV block 1st deg, RBBB, TWI V4-V6  -Trop I @ : 0.30>0.18>0.31, Trop @ [facility] 40  -Cardiac cath 9/24/23 @: LM and double vessel CAD   -s/p cardiac cath 9/25/23: LM distal calcified 80% stenosis, oLAD 80% calcified stenosis, oCLCx 80%  calcified stensis. IVUS LM 3.7-4.4 MCA.   -C/w ASA/statin   - Hold Plavix pending CABG tomorrow 9/27  -NPO after midnight, T+S x 2 ordered Presented to  w CP, cath w LM and double vessel CAD. Currently CP free, HD stable  -Echo 09/21/23 @ : EF 25%, Grade II DD, LA dilated, mod MR, mild AR, small pericardial effusion.  -EKG AV block 1st deg, RBBB, TWI V4-V6  -Trop I @ : 0.30>0.18>0.31, Trop @ [facility] 40  -Cardiac cath 9/24/23 @: LM and double vessel CAD   -s/p cardiac cath 9/25/23: LM distal calcified 80% stenosis, oLAD 80% calcified stenosis, oCLCx 80%  calcified stensis. IVUS LM 3.7-4.4 MCA.   -C/w ASA/statin   - Hold Plavix pending CABG today 9/27  -NPO, T+S x 2 ordered Presented to  w CP, cath w LM and double vessel CAD. Currently CP free, HD stable  -Echo 09/21/23 @ : EF 25%, Grade II DD, LA dilated, mod MR, mild AR, small pericardial effusion.  -EKG AV block 1st deg, RBBB, TWI V4-V6  -Trop I @ : 0.30>0.18>0.31, Trop @ [facility] 40  -Cardiac cath 9/24/23 @: LM and double vessel CAD   -s/p cardiac cath 9/25/23: LM distal calcified 80% stenosis, oLAD 80% calcified stenosis, oCLCx 80%  calcified stensis. IVUS LM 3.7-4.4 MCA.   -C/w ASA/statin   - Hold Plavix pending CABG tmrw 9/27  -NPO, T+S x 2 ordered

## 2023-09-26 NOTE — DIETITIAN INITIAL EVALUATION ADULT - PERTINENT MEDS FT
MEDICATIONS  (STANDING):  acetaminophen     Tablet .. 1000 milliGRAM(s) Oral once  ascorbic acid 2000 milliGRAM(s) Oral once  aspirin enteric coated 81 milliGRAM(s) Oral daily  atorvastatin 40 milliGRAM(s) Oral at bedtime  chlorhexidine 4% Liquid 1 Application(s) Topical once  chlorhexidine 4% Liquid 1 Application(s) Topical once  dapagliflozin 10 milliGRAM(s) Oral every 24 hours  dextrose 5%. 1000 milliLiter(s) (50 mL/Hr) IV Continuous <Continuous>  dextrose 5%. 1000 milliLiter(s) (100 mL/Hr) IV Continuous <Continuous>  dextrose 50% Injectable 12.5 Gram(s) IV Push once  dextrose 50% Injectable 25 Gram(s) IV Push once  dextrose 50% Injectable 25 Gram(s) IV Push once  fluticasone propionate 50 MICROgram(s)/spray Nasal Spray 1 Spray(s) Both Nostrils two times a day  glucagon  Injectable 1 milliGRAM(s) IntraMuscular once  heparin  Infusion.  Unit(s)/Hr (9 mL/Hr) IV Continuous <Continuous>  influenza  Vaccine (HIGH DOSE) 0.7 milliLiter(s) IntraMuscular once  insulin lispro (ADMELOG) corrective regimen sliding scale   SubCutaneous three times a day before meals  insulin lispro (ADMELOG) corrective regimen sliding scale   SubCutaneous at bedtime  metoprolol succinate ER 50 milliGRAM(s) Oral daily  mupirocin 2% Ointment 1 Application(s) Both Nostrils two times a day  sacubitril 24 mG/valsartan 26 mG 1 Tablet(s) Oral two times a day  sodium chloride 0.9%. 500 milliLiter(s) (30 mL/Hr) IV Continuous <Continuous>  spironolactone 25 milliGRAM(s) Oral daily    MEDICATIONS  (PRN):  acetaminophen     Tablet .. 650 milliGRAM(s) Oral every 6 hours PRN Mild Pain (1 - 3), Moderate Pain (4 - 6), Severe Pain (7 - 10)  dextrose Oral Gel 15 Gram(s) Oral once PRN Blood Glucose LESS THAN 70 milliGRAM(s)/deciliter  heparin   Injectable 4400 Unit(s) IV Push every 6 hours PRN For aPTT less than 40

## 2023-09-26 NOTE — DIETITIAN INITIAL EVALUATION ADULT - OTHER CALCULATIONS
5'6''  pounds +-10%; %ETX=956  IBW used to calculate energy needs due to pt's current body weight exceeding 120% of IBW  Adjust for age, Pending OR, noted EF; fluids per team

## 2023-09-26 NOTE — PROGRESS NOTE ADULT - PROBLEM SELECTOR PLAN 2
Patient euvolemic on exam, HD stable  - BNP 2513, trop 40  -Echo as above EF 25%  -GDMT: Toprol 50mg qd, Entresto 24/26mg BID  -c/w daily weights, strict I&Os, core measures

## 2023-09-26 NOTE — PROGRESS NOTE ADULT - ASSESSMENT
84 YO Female w/ PMHx of HTN, HLD, MI (2005), DMII, CAD (s/p ISAAC x 3 @ [facility] most recently 2008), VT arrest (intubation and subsequent ICD placement in 2019 by Dr. Sesay), Breast cancer (s/p lumpectomy and chemo, in remission) who presented to [facility] on 09/21/23 after her ICD fired x 1 with associated CP and dizziness and elevated trop, found to have NSTEMI s/p Cardiac Cath @  on 09/23/23 revealing LM and double vessel CAD and was subsequently transferred to [facility]. Patient continued on heparin gtt and on 9/25 underwent repeat LHC which showed LM distal calcified 80% stenosis, oLAD 80% calcified stenosis, oCLCx 80% calcified stensis, RCA not engaged due to recent LHC.  CT surgery consulted for evaluation for surgical revascularization.    Plan:  Problem 1: Coronary artery disease  -plan for CABG tomorrow with Dr. Ramirez  -Please make sure pt has 2 type and screens  -Cont ASA, BB, statin  -Continue heparin gtt per primary team    Problem 2: Hypertension  -Continue Toprol  -Care per primary team    Problem 3:HFrEF  -Continue Entresto  -Will need to hold prior to OR    Problem 4: Hyperlipidemia  -Continue statin  -Care per primary team    I have reviewed clinical labs tests and reports, radiology tests and reports, as well as old patient medical records, and discussed with the referring physician.

## 2023-09-26 NOTE — PROGRESS NOTE ADULT - PROBLEM SELECTOR PLAN 4
On metformin at home, A1c: 6.8  -c/w ISS      F: None  E: Replete if K<4 or Mag<2  N: DASH Diet  Dispo: Pending cardiac cath     Case discussed with Dr. Galeana Pt endorsed increased urinary frequency and burning with urination.  UA 9/26 reveals + Bacteria, + Nitrites, + Leuks  -start 1g Ceftriaxone IV x 3 days Pt endorsed increased urinary frequency and burning with urination.  UA 9/26 reveals + Bacteria, + Nitrites, + Leuks  -c/w 1g Ceftriaxone IV x 3 days (last day 9/28/23) Pt endorsed increased urinary frequency and burning with urination.  UA 9/26 reveals + Bacteria, + Nitrites, + Leuks  -start 1g Ceftriaxone IV x 3 days (last day 9/28/23)

## 2023-09-26 NOTE — PROGRESS NOTE ADULT - NS ATTEND AMEND GEN_ALL_CORE FT
84yo F with PMHx HTN, HLD, MI (2005), DM, CAD (s/p ISAAC x 3 @ [facility] most recently 2008), VT arrest (intubation and subsequent ICD placement in 2019 by Dr. Sesay), breast cancer (s/p lumpectomy and chemo - in remission) who initially presented to [facility] on 09/21/23 after her ICD fired x 1 with associated CP and dizziness and elevated trop, r/i NSTEMI now s/p cardiac cath @  on 09/23/23 revealing LM and double vessel CAD and was subsequently transferred to [facility] on 09/24/23 for high risk cardiac cath with Dr. Wright. Pt is now s/p cardiac cath on 09/25/23 with severe stenosis LM, CTS consulted for evaluation for surgical revascularization.    1. CAD  Complex CAD s.p NSTEMI type 1, transferred from [place] to [place] to attempt high risk PCI, however given unfavorable anatomy, turned out from PCI, CTS consulted.  Continue aspirin, plavix, lipitor, heparin drip. If chest pain, may need IABP and CCU tx.    2. VT arrest  HR 60s, on BB, no evidence of recurrent VT, likely ischemic.    3. HFrEF  Acute on chronic compensated HFrEF 25%, ischemic dilated,  stage C AHA/ACC, NYHA class II, profile A. Continue entresto and metoprolol, add farxiga and aldactone today.
84yo F with PMHx HTN, HLD, MI (2005), DM, CAD (s/p ISAAC x 3 @ [facility] most recently 2008), VT arrest (intubation and subsequent ICD placement in 2019 by Dr. Sesay), breast cancer (s/p lumpectomy and chemo - in remission) who initially presented to [facility] on 09/21/23 after her ICD fired x 1 with associated CP and dizziness and elevated trop, r/i NSTEMI now s/p cardiac cath @  on 09/23/23 revealing LM and double vessel CAD and was subsequently transferred to [facility] on 09/24/23 for high risk cardiac cath with Dr. Wright. Pt is now s/p cardiac cath on 09/25/23 with severe stenosis LM, CTS consulted for evaluation for surgical revascularization.    1. CAD  Complex CAD s.p NSTEMI type 1, transferred from [place] to [place] to attempt high risk PCI, however given unfavorable anatomy, turned out from PCI, CTS consulted.  Continue aspirin, plavix, lipitor, heparin drip. If chest pain, may need IABP and CCU tx.    2. VT arrest  HR 60s, on BB, no evidence of recurrent VT, likely ischemic.    3. HFrEF  Acute on chronic compensated HFrEF 25%, ischemic dilated,  stage C AHA/ACC, NYHA class II, profile A. Continue entresto and metoprolol, add farxiga and aldactone tomorrow if Cr unchanged.

## 2023-09-26 NOTE — DIETITIAN INITIAL EVALUATION ADULT - OTHER INFO
82yo F PMHx HTN, HLD, MI (2005), DM, CAD (s/p ISACA x3 @[facility] most recently 2008), VT arrest (intubation and subsequent ICD placement in 2019 by Dr. Sesay), Breast cancer (s/p lumpectomy and chemo, in remission) presented to [facility] 9/21/23 after her ICD fired x1 with associated CP and dizziness and elevated trop r/i NSTEMI s/p Cardiac Cath @  9/23/23 revealing LM and double vessel CAD and was subsequently transferred to [facility]. Pt was admitted to [facility] on 9/24/23 for continous monitoring for NSTEMI and cardiac cath 9/25/23 of LM disease. On 9/25 underwent repeat [facility] which showed LM distal calcified 80% stenosis, oLAD 80% calcified stenosis, oCLCx 80% calcified stensis, RCA not engaged due to recent LHC. Pt is now scheduled for OPCAB tomorrow with Dr. Ramirez.     Pt seen this afternoon on 5UR. Brother at bedside. DASH Diet ordered. Consumed 50-75% of meals. PTA reports eating in a similar fashion. Reported to like cherries and chocolate. Noted DM hx. Pt reports BG/POCT run ~140-164 in AM prior to meals. Since admit noted POCT 177 167 163, , A1c 6.3%. Reports taking all DM meds as ordered PTA. Other labs of note: Lipids WDL. NKFA. No issues chewing/swallowing. No pain. Arian 20, No Edema, No pressure ulcer.   Please see below for nutritions recommendations.

## 2023-09-26 NOTE — DIETITIAN INITIAL EVALUATION ADULT - PROBLEM SELECTOR PLAN 3
on metformin at home, A1c: 7.1  -c/w ISS  -consult endo    F: 30cc/hr x 8 hr pre cath IV NS   E: Replete if K<4 or Mag<2  N: NPO at MN  VTEppx: on heparin gtt

## 2023-09-27 ENCOUNTER — APPOINTMENT (OUTPATIENT)
Dept: CARDIOTHORACIC SURGERY | Facility: HOSPITAL | Age: 83
End: 2023-09-27

## 2023-09-27 ENCOUNTER — TRANSCRIPTION ENCOUNTER (OUTPATIENT)
Age: 83
End: 2023-09-27

## 2023-09-27 LAB
ALBUMIN SERPL ELPH-MCNC: 3.1 G/DL — LOW (ref 3.3–5)
ALBUMIN SERPL ELPH-MCNC: 3.5 G/DL — SIGNIFICANT CHANGE UP (ref 3.3–5)
ALBUMIN SERPL ELPH-MCNC: 4.2 G/DL — SIGNIFICANT CHANGE UP (ref 3.3–5)
ALP SERPL-CCNC: 41 U/L — SIGNIFICANT CHANGE UP (ref 40–120)
ALP SERPL-CCNC: 45 U/L — SIGNIFICANT CHANGE UP (ref 40–120)
ALP SERPL-CCNC: 64 U/L — SIGNIFICANT CHANGE UP (ref 40–120)
ALT FLD-CCNC: 37 U/L — SIGNIFICANT CHANGE UP (ref 10–45)
ALT FLD-CCNC: 39 U/L — SIGNIFICANT CHANGE UP (ref 10–45)
ALT FLD-CCNC: 43 U/L — SIGNIFICANT CHANGE UP (ref 10–45)
ANION GAP SERPL CALC-SCNC: 12 MMOL/L — SIGNIFICANT CHANGE UP (ref 5–17)
ANION GAP SERPL CALC-SCNC: 12 MMOL/L — SIGNIFICANT CHANGE UP (ref 5–17)
ANION GAP SERPL CALC-SCNC: 8 MMOL/L — SIGNIFICANT CHANGE UP (ref 5–17)
APTT BLD: 27.4 SEC — SIGNIFICANT CHANGE UP (ref 24.5–35.6)
APTT BLD: 28.6 SEC — SIGNIFICANT CHANGE UP (ref 24.5–35.6)
APTT BLD: 69.1 SEC — HIGH (ref 24.5–35.6)
AST SERPL-CCNC: 26 U/L — SIGNIFICANT CHANGE UP (ref 10–40)
AST SERPL-CCNC: 34 U/L — SIGNIFICANT CHANGE UP (ref 10–40)
AST SERPL-CCNC: 36 U/L — SIGNIFICANT CHANGE UP (ref 10–40)
BASE EXCESS BLDA CALC-SCNC: -1.2 MMOL/L — SIGNIFICANT CHANGE UP (ref -2–3)
BASE EXCESS BLDA CALC-SCNC: -1.9 MMOL/L — SIGNIFICANT CHANGE UP (ref -2–3)
BASE EXCESS BLDA CALC-SCNC: -2.6 MMOL/L — LOW (ref -2–3)
BASE EXCESS BLDA CALC-SCNC: 0 MMOL/L — SIGNIFICANT CHANGE UP (ref -2–3)
BASE EXCESS BLDA CALC-SCNC: 2.1 MMOL/L — SIGNIFICANT CHANGE UP (ref -2–3)
BASOPHILS # BLD AUTO: 0.07 K/UL — SIGNIFICANT CHANGE UP (ref 0–0.2)
BASOPHILS # BLD AUTO: 0.09 K/UL — SIGNIFICANT CHANGE UP (ref 0–0.2)
BASOPHILS NFR BLD AUTO: 0.4 % — SIGNIFICANT CHANGE UP (ref 0–2)
BASOPHILS NFR BLD AUTO: 0.5 % — SIGNIFICANT CHANGE UP (ref 0–2)
BILIRUB SERPL-MCNC: 0.6 MG/DL — SIGNIFICANT CHANGE UP (ref 0.2–1.2)
BILIRUB SERPL-MCNC: 0.6 MG/DL — SIGNIFICANT CHANGE UP (ref 0.2–1.2)
BILIRUB SERPL-MCNC: 0.9 MG/DL — SIGNIFICANT CHANGE UP (ref 0.2–1.2)
BLD GP AB SCN SERPL QL: NEGATIVE — SIGNIFICANT CHANGE UP
BUN SERPL-MCNC: 18 MG/DL — SIGNIFICANT CHANGE UP (ref 7–23)
BUN SERPL-MCNC: 19 MG/DL — SIGNIFICANT CHANGE UP (ref 7–23)
BUN SERPL-MCNC: 20 MG/DL — SIGNIFICANT CHANGE UP (ref 7–23)
CA-I BLDA-SCNC: 1.22 MMOL/L — SIGNIFICANT CHANGE UP (ref 1.15–1.33)
CA-I BLDA-SCNC: 1.23 MMOL/L — SIGNIFICANT CHANGE UP (ref 1.15–1.33)
CA-I BLDA-SCNC: 1.24 MMOL/L — SIGNIFICANT CHANGE UP (ref 1.15–1.33)
CA-I BLDA-SCNC: 1.25 MMOL/L — SIGNIFICANT CHANGE UP (ref 1.15–1.33)
CA-I BLDA-SCNC: 1.27 MMOL/L — SIGNIFICANT CHANGE UP (ref 1.15–1.33)
CALCIUM SERPL-MCNC: 10.1 MG/DL — SIGNIFICANT CHANGE UP (ref 8.4–10.5)
CALCIUM SERPL-MCNC: 9 MG/DL — SIGNIFICANT CHANGE UP (ref 8.4–10.5)
CALCIUM SERPL-MCNC: 9.6 MG/DL — SIGNIFICANT CHANGE UP (ref 8.4–10.5)
CHLORIDE SERPL-SCNC: 103 MMOL/L — SIGNIFICANT CHANGE UP (ref 96–108)
CHLORIDE SERPL-SCNC: 104 MMOL/L — SIGNIFICANT CHANGE UP (ref 96–108)
CHLORIDE SERPL-SCNC: 106 MMOL/L — SIGNIFICANT CHANGE UP (ref 96–108)
CO2 BLDA-SCNC: 22 MMOL/L — SIGNIFICANT CHANGE UP (ref 19–24)
CO2 BLDA-SCNC: 22 MMOL/L — SIGNIFICANT CHANGE UP (ref 19–24)
CO2 BLDA-SCNC: 24 MMOL/L — SIGNIFICANT CHANGE UP (ref 19–24)
CO2 BLDA-SCNC: 25 MMOL/L — HIGH (ref 19–24)
CO2 BLDA-SCNC: 26 MMOL/L — HIGH (ref 19–24)
CO2 SERPL-SCNC: 19 MMOL/L — LOW (ref 22–31)
CO2 SERPL-SCNC: 21 MMOL/L — LOW (ref 22–31)
CO2 SERPL-SCNC: 24 MMOL/L — SIGNIFICANT CHANGE UP (ref 22–31)
COHGB MFR BLDA: 0 % — SIGNIFICANT CHANGE UP
COHGB MFR BLDA: 0.5 % — SIGNIFICANT CHANGE UP
COHGB MFR BLDA: 0.6 % — SIGNIFICANT CHANGE UP
COHGB MFR BLDA: 0.7 % — SIGNIFICANT CHANGE UP
COHGB MFR BLDA: 0.7 % — SIGNIFICANT CHANGE UP
CREAT SERPL-MCNC: 0.77 MG/DL — SIGNIFICANT CHANGE UP (ref 0.5–1.3)
CREAT SERPL-MCNC: 0.8 MG/DL — SIGNIFICANT CHANGE UP (ref 0.5–1.3)
CREAT SERPL-MCNC: 0.83 MG/DL — SIGNIFICANT CHANGE UP (ref 0.5–1.3)
EGFR: 70 ML/MIN/1.73M2 — SIGNIFICANT CHANGE UP
EGFR: 73 ML/MIN/1.73M2 — SIGNIFICANT CHANGE UP
EGFR: 76 ML/MIN/1.73M2 — SIGNIFICANT CHANGE UP
EOSINOPHIL # BLD AUTO: 0.03 K/UL — SIGNIFICANT CHANGE UP (ref 0–0.5)
EOSINOPHIL # BLD AUTO: 0.14 K/UL — SIGNIFICANT CHANGE UP (ref 0–0.5)
EOSINOPHIL NFR BLD AUTO: 0.2 % — SIGNIFICANT CHANGE UP (ref 0–6)
EOSINOPHIL NFR BLD AUTO: 0.7 % — SIGNIFICANT CHANGE UP (ref 0–6)
GAS PNL BLDA: SIGNIFICANT CHANGE UP
GLUCOSE BLDA-MCNC: 143 MG/DL — HIGH (ref 70–99)
GLUCOSE BLDA-MCNC: 167 MG/DL — HIGH (ref 70–99)
GLUCOSE BLDA-MCNC: 173 MG/DL — HIGH (ref 70–99)
GLUCOSE BLDA-MCNC: 179 MG/DL — HIGH (ref 70–99)
GLUCOSE BLDA-MCNC: 181 MG/DL — HIGH (ref 70–99)
GLUCOSE BLDC GLUCOMTR-MCNC: 121 MG/DL — HIGH (ref 70–99)
GLUCOSE BLDC GLUCOMTR-MCNC: 194 MG/DL — HIGH (ref 70–99)
GLUCOSE BLDC GLUCOMTR-MCNC: 215 MG/DL — HIGH (ref 70–99)
GLUCOSE BLDC GLUCOMTR-MCNC: 253 MG/DL — HIGH (ref 70–99)
GLUCOSE SERPL-MCNC: 177 MG/DL — HIGH (ref 70–99)
GLUCOSE SERPL-MCNC: 199 MG/DL — HIGH (ref 70–99)
GLUCOSE SERPL-MCNC: 215 MG/DL — HIGH (ref 70–99)
HCO3 BLDA-SCNC: 21 MMOL/L — SIGNIFICANT CHANGE UP (ref 21–28)
HCO3 BLDA-SCNC: 22 MMOL/L — SIGNIFICANT CHANGE UP (ref 21–28)
HCO3 BLDA-SCNC: 23 MMOL/L — SIGNIFICANT CHANGE UP (ref 21–28)
HCO3 BLDA-SCNC: 24 MMOL/L — SIGNIFICANT CHANGE UP (ref 21–28)
HCO3 BLDA-SCNC: 25 MMOL/L — SIGNIFICANT CHANGE UP (ref 21–28)
HCT VFR BLD CALC: 35.8 % — SIGNIFICANT CHANGE UP (ref 34.5–45)
HCT VFR BLD CALC: 37.4 % — SIGNIFICANT CHANGE UP (ref 34.5–45)
HCT VFR BLD CALC: 38.5 % — SIGNIFICANT CHANGE UP (ref 34.5–45)
HGB BLD-MCNC: 11.9 G/DL — SIGNIFICANT CHANGE UP (ref 11.5–15.5)
HGB BLD-MCNC: 12.5 G/DL — SIGNIFICANT CHANGE UP (ref 11.5–15.5)
HGB BLD-MCNC: 12.7 G/DL — SIGNIFICANT CHANGE UP (ref 11.5–15.5)
HGB BLDA-MCNC: 10 G/DL — LOW (ref 11.7–16.1)
HGB BLDA-MCNC: 11.5 G/DL — LOW (ref 11.7–16.1)
HGB BLDA-MCNC: 11.5 G/DL — LOW (ref 11.7–16.1)
HGB BLDA-MCNC: 12.2 G/DL — SIGNIFICANT CHANGE UP (ref 11.7–16.1)
HGB BLDA-MCNC: 9.9 G/DL — LOW (ref 11.7–16.1)
IMM GRANULOCYTES NFR BLD AUTO: 1.2 % — HIGH (ref 0–0.9)
IMM GRANULOCYTES NFR BLD AUTO: 1.4 % — HIGH (ref 0–0.9)
INR BLD: 1.03 — SIGNIFICANT CHANGE UP (ref 0.85–1.18)
INR BLD: 1.17 — SIGNIFICANT CHANGE UP (ref 0.85–1.18)
INR BLD: 1.2 — HIGH (ref 0.85–1.18)
ISTAT ARTERIAL BE: -3 MMOL/L — LOW (ref -2–3)
ISTAT ARTERIAL GLUCOSE: 168 MG/DL — HIGH (ref 70–99)
ISTAT ARTERIAL HCO3: 22 MMOL/L — SIGNIFICANT CHANGE UP (ref 22–26)
ISTAT ARTERIAL HEMATOCRIT: 35 % — SIGNIFICANT CHANGE UP (ref 34.5–45)
ISTAT ARTERIAL HEMOGLOBIN: 11.9 G/DL — SIGNIFICANT CHANGE UP (ref 11.5–15.5)
ISTAT ARTERIAL IONIZED CALCIUM: 1.32 MMOL/L — HIGH (ref 1.12–1.3)
ISTAT ARTERIAL PCO2: 38 MMHG — SIGNIFICANT CHANGE UP (ref 35–45)
ISTAT ARTERIAL PH: 7.36 — SIGNIFICANT CHANGE UP (ref 7.35–7.45)
ISTAT ARTERIAL PO2: 290 MMHG — HIGH (ref 80–105)
ISTAT ARTERIAL POTASSIUM: 4.4 MMOL/L — SIGNIFICANT CHANGE UP (ref 3.5–5.3)
ISTAT ARTERIAL SO2: 100 % — HIGH (ref 95–98)
ISTAT ARTERIAL SODIUM: 138 MMOL/L — SIGNIFICANT CHANGE UP (ref 135–145)
ISTAT ARTERIAL TCO2: 23 MMOL/L — SIGNIFICANT CHANGE UP (ref 22–31)
LYMPHOCYTES # BLD AUTO: 1.92 K/UL — SIGNIFICANT CHANGE UP (ref 1–3.3)
LYMPHOCYTES # BLD AUTO: 13.5 % — SIGNIFICANT CHANGE UP (ref 13–44)
LYMPHOCYTES # BLD AUTO: 2.59 K/UL — SIGNIFICANT CHANGE UP (ref 1–3.3)
LYMPHOCYTES # BLD AUTO: 9.9 % — LOW (ref 13–44)
MAGNESIUM SERPL-MCNC: 1.6 MG/DL — SIGNIFICANT CHANGE UP (ref 1.6–2.6)
MAGNESIUM SERPL-MCNC: 1.7 MG/DL — SIGNIFICANT CHANGE UP (ref 1.6–2.6)
MAGNESIUM SERPL-MCNC: 1.9 MG/DL — SIGNIFICANT CHANGE UP (ref 1.6–2.6)
MCHC RBC-ENTMCNC: 30.9 PG — SIGNIFICANT CHANGE UP (ref 27–34)
MCHC RBC-ENTMCNC: 31.2 PG — SIGNIFICANT CHANGE UP (ref 27–34)
MCHC RBC-ENTMCNC: 31.5 PG — SIGNIFICANT CHANGE UP (ref 27–34)
MCHC RBC-ENTMCNC: 33 GM/DL — SIGNIFICANT CHANGE UP (ref 32–36)
MCHC RBC-ENTMCNC: 33.2 GM/DL — SIGNIFICANT CHANGE UP (ref 32–36)
MCHC RBC-ENTMCNC: 33.4 GM/DL — SIGNIFICANT CHANGE UP (ref 32–36)
MCV RBC AUTO: 92.3 FL — SIGNIFICANT CHANGE UP (ref 80–100)
MCV RBC AUTO: 94 FL — SIGNIFICANT CHANGE UP (ref 80–100)
MCV RBC AUTO: 95.5 FL — SIGNIFICANT CHANGE UP (ref 80–100)
METHGB MFR BLDA: 0.7 % — SIGNIFICANT CHANGE UP
METHGB MFR BLDA: 0.8 % — SIGNIFICANT CHANGE UP
METHGB MFR BLDA: 1 % — SIGNIFICANT CHANGE UP
METHGB MFR BLDA: 1 % — SIGNIFICANT CHANGE UP
METHGB MFR BLDA: 1.2 % — SIGNIFICANT CHANGE UP
MONOCYTES # BLD AUTO: 1.29 K/UL — HIGH (ref 0–0.9)
MONOCYTES # BLD AUTO: 1.32 K/UL — HIGH (ref 0–0.9)
MONOCYTES NFR BLD AUTO: 6.7 % — SIGNIFICANT CHANGE UP (ref 2–14)
MONOCYTES NFR BLD AUTO: 6.8 % — SIGNIFICANT CHANGE UP (ref 2–14)
NEUTROPHILS # BLD AUTO: 14.79 K/UL — HIGH (ref 1.8–7.4)
NEUTROPHILS # BLD AUTO: 15.75 K/UL — HIGH (ref 1.8–7.4)
NEUTROPHILS NFR BLD AUTO: 77.4 % — HIGH (ref 43–77)
NEUTROPHILS NFR BLD AUTO: 81.3 % — HIGH (ref 43–77)
NRBC # BLD: 0 /100 WBCS — SIGNIFICANT CHANGE UP (ref 0–0)
NRBC # BLD: 0 /100 WBCS — SIGNIFICANT CHANGE UP (ref 0–0)
NRBC # BLD: 1 /100 WBCS — HIGH (ref 0–0)
OXYHGB MFR BLDA: 96.5 % — HIGH (ref 90–95)
OXYHGB MFR BLDA: 97 % — HIGH (ref 90–95)
OXYHGB MFR BLDA: 97.1 % — HIGH (ref 90–95)
OXYHGB MFR BLDA: 97.1 % — HIGH (ref 90–95)
OXYHGB MFR BLDA: 97.4 % — HIGH (ref 90–95)
PCO2 BLDA: 31 MMHG — LOW (ref 32–45)
PCO2 BLDA: 33 MMHG — SIGNIFICANT CHANGE UP (ref 32–45)
PCO2 BLDA: 34 MMHG — SIGNIFICANT CHANGE UP (ref 32–45)
PCO2 BLDA: 34 MMHG — SIGNIFICANT CHANGE UP (ref 32–45)
PCO2 BLDA: 35 MMHG — SIGNIFICANT CHANGE UP (ref 32–45)
PH BLDA: 7.42 — SIGNIFICANT CHANGE UP (ref 7.35–7.45)
PH BLDA: 7.43 — SIGNIFICANT CHANGE UP (ref 7.35–7.45)
PH BLDA: 7.44 — SIGNIFICANT CHANGE UP (ref 7.35–7.45)
PH BLDA: 7.45 — SIGNIFICANT CHANGE UP (ref 7.35–7.45)
PH BLDA: 7.48 — HIGH (ref 7.35–7.45)
PHOSPHATE SERPL-MCNC: 3.9 MG/DL — SIGNIFICANT CHANGE UP (ref 2.5–4.5)
PHOSPHATE SERPL-MCNC: 4.7 MG/DL — HIGH (ref 2.5–4.5)
PLATELET # BLD AUTO: 188 K/UL — SIGNIFICANT CHANGE UP (ref 150–400)
PLATELET # BLD AUTO: 205 K/UL — SIGNIFICANT CHANGE UP (ref 150–400)
PLATELET # BLD AUTO: 235 K/UL — SIGNIFICANT CHANGE UP (ref 150–400)
PO2 BLDA: 210 MMHG — HIGH (ref 83–108)
PO2 BLDA: 285 MMHG — HIGH (ref 83–108)
PO2 BLDA: 286 MMHG — HIGH (ref 83–108)
PO2 BLDA: 327 MMHG — HIGH (ref 83–108)
PO2 BLDA: 463 MMHG — HIGH (ref 83–108)
POTASSIUM BLDA-SCNC: 3.8 MMOL/L — SIGNIFICANT CHANGE UP (ref 3.5–5.1)
POTASSIUM BLDA-SCNC: 4.4 MMOL/L — SIGNIFICANT CHANGE UP (ref 3.5–5.1)
POTASSIUM BLDA-SCNC: 4.4 MMOL/L — SIGNIFICANT CHANGE UP (ref 3.5–5.1)
POTASSIUM BLDA-SCNC: 4.6 MMOL/L — SIGNIFICANT CHANGE UP (ref 3.5–5.1)
POTASSIUM BLDA-SCNC: 5 MMOL/L — SIGNIFICANT CHANGE UP (ref 3.5–5.1)
POTASSIUM SERPL-MCNC: 4.4 MMOL/L — SIGNIFICANT CHANGE UP (ref 3.5–5.3)
POTASSIUM SERPL-MCNC: 4.6 MMOL/L — SIGNIFICANT CHANGE UP (ref 3.5–5.3)
POTASSIUM SERPL-MCNC: 4.8 MMOL/L — SIGNIFICANT CHANGE UP (ref 3.5–5.3)
POTASSIUM SERPL-SCNC: 4.4 MMOL/L — SIGNIFICANT CHANGE UP (ref 3.5–5.3)
POTASSIUM SERPL-SCNC: 4.6 MMOL/L — SIGNIFICANT CHANGE UP (ref 3.5–5.3)
POTASSIUM SERPL-SCNC: 4.8 MMOL/L — SIGNIFICANT CHANGE UP (ref 3.5–5.3)
PROT SERPL-MCNC: 5.3 G/DL — LOW (ref 6–8.3)
PROT SERPL-MCNC: 5.5 G/DL — LOW (ref 6–8.3)
PROT SERPL-MCNC: 7.4 G/DL — SIGNIFICANT CHANGE UP (ref 6–8.3)
PROTHROM AB SERPL-ACNC: 11.7 SEC — SIGNIFICANT CHANGE UP (ref 9.5–13)
PROTHROM AB SERPL-ACNC: 13.3 SEC — HIGH (ref 9.5–13)
PROTHROM AB SERPL-ACNC: 13.6 SEC — HIGH (ref 9.5–13)
RBC # BLD: 3.81 M/UL — SIGNIFICANT CHANGE UP (ref 3.8–5.2)
RBC # BLD: 4.03 M/UL — SIGNIFICANT CHANGE UP (ref 3.8–5.2)
RBC # BLD: 4.05 M/UL — SIGNIFICANT CHANGE UP (ref 3.8–5.2)
RBC # FLD: 14.4 % — SIGNIFICANT CHANGE UP (ref 10.3–14.5)
RBC # FLD: 15.1 % — HIGH (ref 10.3–14.5)
RBC # FLD: 15.7 % — HIGH (ref 10.3–14.5)
RH IG SCN BLD-IMP: POSITIVE — SIGNIFICANT CHANGE UP
SAO2 % BLDA: 97.7 % — SIGNIFICANT CHANGE UP (ref 94–98)
SAO2 % BLDA: 98.6 % — HIGH (ref 94–98)
SAO2 % BLDA: 98.7 % — HIGH (ref 94–98)
SODIUM BLDA-SCNC: 134 MMOL/L — LOW (ref 136–145)
SODIUM BLDA-SCNC: 134 MMOL/L — LOW (ref 136–145)
SODIUM BLDA-SCNC: 135 MMOL/L — LOW (ref 136–145)
SODIUM SERPL-SCNC: 135 MMOL/L — SIGNIFICANT CHANGE UP (ref 135–145)
SODIUM SERPL-SCNC: 135 MMOL/L — SIGNIFICANT CHANGE UP (ref 135–145)
SODIUM SERPL-SCNC: 139 MMOL/L — SIGNIFICANT CHANGE UP (ref 135–145)
WBC # BLD: 19.12 K/UL — HIGH (ref 3.8–10.5)
WBC # BLD: 19.37 K/UL — HIGH (ref 3.8–10.5)
WBC # BLD: 4.11 K/UL — SIGNIFICANT CHANGE UP (ref 3.8–10.5)
WBC # FLD AUTO: 19.12 K/UL — HIGH (ref 3.8–10.5)
WBC # FLD AUTO: 19.37 K/UL — HIGH (ref 3.8–10.5)
WBC # FLD AUTO: 4.11 K/UL — SIGNIFICANT CHANGE UP (ref 3.8–10.5)

## 2023-09-27 PROCEDURE — 99291 CRITICAL CARE FIRST HOUR: CPT

## 2023-09-27 PROCEDURE — 33533 CABG ARTERIAL SINGLE: CPT

## 2023-09-27 PROCEDURE — 93010 ELECTROCARDIOGRAM REPORT: CPT

## 2023-09-27 PROCEDURE — 71045 X-RAY EXAM CHEST 1 VIEW: CPT | Mod: 26

## 2023-09-27 PROCEDURE — 33517 CABG ARTERY-VEIN SINGLE: CPT

## 2023-09-27 DEVICE — ARISTA 3GR: Type: IMPLANTABLE DEVICE | Status: FUNCTIONAL

## 2023-09-27 DEVICE — SHUNT FLO-THRU INTRALUMINAL 2MM X 18MM: Type: IMPLANTABLE DEVICE | Status: FUNCTIONAL

## 2023-09-27 DEVICE — CHEST DRAIN THORACIC PVC 28FR RIGHT ANGLE: Type: IMPLANTABLE DEVICE | Status: FUNCTIONAL

## 2023-09-27 DEVICE — PACING WIRE STREAMLINE BIPOLAR MYOCARDIAL: Type: IMPLANTABLE DEVICE | Status: FUNCTIONAL

## 2023-09-27 DEVICE — SURGIFLO HEMOSTATIC MATRIX KIT: Type: IMPLANTABLE DEVICE | Status: FUNCTIONAL

## 2023-09-27 DEVICE — SHUNT FLO-THRU INTRALUMINAL 2.25MM X 18MM: Type: IMPLANTABLE DEVICE | Status: FUNCTIONAL

## 2023-09-27 DEVICE — TACHOSIL 4.8 X 4.8CM: Type: IMPLANTABLE DEVICE | Status: FUNCTIONAL

## 2023-09-27 DEVICE — HEARTSTRING III PROXIMAL SEAL SYSTEM: Type: IMPLANTABLE DEVICE | Status: FUNCTIONAL

## 2023-09-27 DEVICE — LUKENS BONE WAX 2.5G: Type: IMPLANTABLE DEVICE | Status: FUNCTIONAL

## 2023-09-27 DEVICE — SURGICEL FIBRILLAR 4 X 4": Type: IMPLANTABLE DEVICE | Status: FUNCTIONAL

## 2023-09-27 DEVICE — KIT CVC 3LUM SPECTRUM 9FR: Type: IMPLANTABLE DEVICE | Status: FUNCTIONAL

## 2023-09-27 RX ORDER — ALBUMIN HUMAN 25 %
250 VIAL (ML) INTRAVENOUS ONCE
Refills: 0 | Status: COMPLETED | OUTPATIENT
Start: 2023-09-27 | End: 2023-09-27

## 2023-09-27 RX ORDER — INSULIN HUMAN 100 [IU]/ML
1 INJECTION, SOLUTION SUBCUTANEOUS
Qty: 100 | Refills: 0 | Status: DISCONTINUED | OUTPATIENT
Start: 2023-09-27 | End: 2023-09-28

## 2023-09-27 RX ORDER — CHLORHEXIDINE GLUCONATE 213 G/1000ML
1 SOLUTION TOPICAL DAILY
Refills: 0 | Status: DISCONTINUED | OUTPATIENT
Start: 2023-09-27 | End: 2023-10-02

## 2023-09-27 RX ORDER — MAGNESIUM SULFATE 500 MG/ML
2 VIAL (ML) INJECTION ONCE
Refills: 0 | Status: COMPLETED | OUTPATIENT
Start: 2023-09-27 | End: 2023-09-27

## 2023-09-27 RX ORDER — POLYETHYLENE GLYCOL 3350 17 G/17G
17 POWDER, FOR SOLUTION ORAL DAILY
Refills: 0 | Status: DISCONTINUED | OUTPATIENT
Start: 2023-09-28 | End: 2023-10-02

## 2023-09-27 RX ORDER — NOREPINEPHRINE BITARTRATE/D5W 8 MG/250ML
0.05 PLASTIC BAG, INJECTION (ML) INTRAVENOUS
Qty: 8 | Refills: 0 | Status: DISCONTINUED | OUTPATIENT
Start: 2023-09-27 | End: 2023-09-28

## 2023-09-27 RX ORDER — DEXTROSE 50 % IN WATER 50 %
25 SYRINGE (ML) INTRAVENOUS
Refills: 0 | Status: DISCONTINUED | OUTPATIENT
Start: 2023-09-27 | End: 2023-09-28

## 2023-09-27 RX ORDER — DEXMEDETOMIDINE HYDROCHLORIDE IN 0.9% SODIUM CHLORIDE 4 UG/ML
0.2 INJECTION INTRAVENOUS
Qty: 400 | Refills: 0 | Status: DISCONTINUED | OUTPATIENT
Start: 2023-09-27 | End: 2023-09-28

## 2023-09-27 RX ORDER — ASCORBIC ACID 60 MG
500 TABLET,CHEWABLE ORAL
Refills: 0 | Status: DISCONTINUED | OUTPATIENT
Start: 2023-09-27 | End: 2023-10-02

## 2023-09-27 RX ORDER — MUPIROCIN 20 MG/G
1 OINTMENT TOPICAL
Refills: 0 | Status: DISCONTINUED | OUTPATIENT
Start: 2023-09-27 | End: 2023-10-02

## 2023-09-27 RX ORDER — DEXTROSE 50 % IN WATER 50 %
50 SYRINGE (ML) INTRAVENOUS
Refills: 0 | Status: DISCONTINUED | OUTPATIENT
Start: 2023-09-27 | End: 2023-09-28

## 2023-09-27 RX ORDER — ATORVASTATIN CALCIUM 80 MG/1
40 TABLET, FILM COATED ORAL AT BEDTIME
Refills: 0 | Status: DISCONTINUED | OUTPATIENT
Start: 2023-09-27 | End: 2023-10-02

## 2023-09-27 RX ORDER — ACETAMINOPHEN 500 MG
1000 TABLET ORAL EVERY 6 HOURS
Refills: 0 | Status: COMPLETED | OUTPATIENT
Start: 2023-09-27 | End: 2023-09-28

## 2023-09-27 RX ORDER — ASPIRIN/CALCIUM CARB/MAGNESIUM 324 MG
81 TABLET ORAL DAILY
Refills: 0 | Status: DISCONTINUED | OUTPATIENT
Start: 2023-09-27 | End: 2023-10-02

## 2023-09-27 RX ORDER — CEFAZOLIN SODIUM 1 G
2000 VIAL (EA) INJECTION EVERY 8 HOURS
Refills: 0 | Status: COMPLETED | OUTPATIENT
Start: 2023-09-27 | End: 2023-09-29

## 2023-09-27 RX ORDER — SENNA PLUS 8.6 MG/1
2 TABLET ORAL AT BEDTIME
Refills: 0 | Status: DISCONTINUED | OUTPATIENT
Start: 2023-09-28 | End: 2023-10-02

## 2023-09-27 RX ORDER — PANTOPRAZOLE SODIUM 20 MG/1
40 TABLET, DELAYED RELEASE ORAL DAILY
Refills: 0 | Status: DISCONTINUED | OUTPATIENT
Start: 2023-09-27 | End: 2023-10-02

## 2023-09-27 RX ORDER — HEPARIN SODIUM 5000 [USP'U]/ML
5000 INJECTION INTRAVENOUS; SUBCUTANEOUS EVERY 8 HOURS
Refills: 0 | Status: DISCONTINUED | OUTPATIENT
Start: 2023-09-27 | End: 2023-10-02

## 2023-09-27 RX ORDER — SODIUM CHLORIDE 9 MG/ML
1000 INJECTION INTRAMUSCULAR; INTRAVENOUS; SUBCUTANEOUS
Refills: 0 | Status: DISCONTINUED | OUTPATIENT
Start: 2023-09-27 | End: 2023-10-02

## 2023-09-27 RX ORDER — ALBUMIN HUMAN 25 %
250 VIAL (ML) INTRAVENOUS ONCE
Refills: 0 | Status: DISCONTINUED | OUTPATIENT
Start: 2023-09-27 | End: 2023-10-02

## 2023-09-27 RX ORDER — CLOPIDOGREL BISULFATE 75 MG/1
75 TABLET, FILM COATED ORAL DAILY
Refills: 0 | Status: DISCONTINUED | OUTPATIENT
Start: 2023-09-27 | End: 2023-10-02

## 2023-09-27 RX ORDER — SODIUM BICARBONATE 1 MEQ/ML
50 SYRINGE (ML) INTRAVENOUS
Refills: 0 | Status: COMPLETED | OUTPATIENT
Start: 2023-09-27 | End: 2023-09-28

## 2023-09-27 RX ORDER — POLYETHYLENE GLYCOL 3350 17 G/17G
17 POWDER, FOR SOLUTION ORAL DAILY
Refills: 0 | Status: DISCONTINUED | OUTPATIENT
Start: 2023-09-27 | End: 2023-09-27

## 2023-09-27 RX ORDER — CHLORHEXIDINE GLUCONATE 213 G/1000ML
15 SOLUTION TOPICAL EVERY 12 HOURS
Refills: 0 | Status: DISCONTINUED | OUTPATIENT
Start: 2023-09-27 | End: 2023-09-28

## 2023-09-27 RX ORDER — FENTANYL CITRATE 50 UG/ML
25 INJECTION INTRAVENOUS
Refills: 0 | Status: DISCONTINUED | OUTPATIENT
Start: 2023-09-27 | End: 2023-09-29

## 2023-09-27 RX ADMIN — Medication 50 MILLIEQUIVALENT(S): at 23:58

## 2023-09-27 RX ADMIN — Medication 125 MILLILITER(S): at 20:19

## 2023-09-27 RX ADMIN — Medication 1: at 07:09

## 2023-09-27 RX ADMIN — MUPIROCIN 1 APPLICATION(S): 20 OINTMENT TOPICAL at 06:00

## 2023-09-27 RX ADMIN — SPIRONOLACTONE 25 MILLIGRAM(S): 25 TABLET, FILM COATED ORAL at 05:56

## 2023-09-27 RX ADMIN — Medication 1 SPRAY(S): at 05:56

## 2023-09-27 RX ADMIN — Medication 50 MILLIGRAM(S): at 05:56

## 2023-09-27 RX ADMIN — Medication 25 GRAM(S): at 21:29

## 2023-09-27 RX ADMIN — HEPARIN SODIUM 850 UNIT(S)/HR: 5000 INJECTION INTRAVENOUS; SUBCUTANEOUS at 09:08

## 2023-09-27 RX ADMIN — SACUBITRIL AND VALSARTAN 1 TABLET(S): 24; 26 TABLET, FILM COATED ORAL at 05:56

## 2023-09-27 RX ADMIN — INSULIN HUMAN 1 UNIT(S)/HR: 100 INJECTION, SOLUTION SUBCUTANEOUS at 23:58

## 2023-09-27 RX ADMIN — Medication 1000 MILLIGRAM(S): at 22:30

## 2023-09-27 RX ADMIN — HEPARIN SODIUM 5000 UNIT(S): 5000 INJECTION INTRAVENOUS; SUBCUTANEOUS at 21:29

## 2023-09-27 RX ADMIN — Medication 125 MILLILITER(S): at 21:55

## 2023-09-27 RX ADMIN — Medication 100 MILLIGRAM(S): at 21:29

## 2023-09-27 RX ADMIN — Medication 1000 MILLIGRAM(S): at 11:18

## 2023-09-27 RX ADMIN — Medication 400 MILLIGRAM(S): at 22:00

## 2023-09-27 RX ADMIN — Medication 81 MILLIGRAM(S): at 11:18

## 2023-09-27 RX ADMIN — Medication 125 MILLILITER(S): at 23:59

## 2023-09-27 RX ADMIN — CHLORHEXIDINE GLUCONATE 1 APPLICATION(S): 213 SOLUTION TOPICAL at 05:57

## 2023-09-27 RX ADMIN — HEPARIN SODIUM 850 UNIT(S)/HR: 5000 INJECTION INTRAVENOUS; SUBCUTANEOUS at 08:21

## 2023-09-27 RX ADMIN — CHLORHEXIDINE GLUCONATE 15 MILLILITER(S): 213 SOLUTION TOPICAL at 05:56

## 2023-09-27 NOTE — PROGRESS NOTE ADULT - SUBJECTIVE AND OBJECTIVE BOX
INTERVAL COURSE  POD#0  OPCAB x 2 LIMA-LAD SVG- OM EF 25%   Received intubated on Levophed   Woke up nonfocal       VITALS  Vital Signs Last 24 Hrs  T(C): 36.3 (27 Sep 2023 21:21), Max: 36.7 (27 Sep 2023 00:52)  T(F): 97.4 (27 Sep 2023 21:21), Max: 98.1 (27 Sep 2023 00:52)  HR: 63 (27 Sep 2023 23:00) (56 - 82)  BP: 122/72 (27 Sep 2023 14:00) (115/55 - 122/72)  BP(mean): 92 (27 Sep 2023 14:00) (77 - 92)  RR: 16 (27 Sep 2023 23:00) (10 - 19)  SpO2: 99% (27 Sep 2023 23:00) (96% - 100%)  Parameters below as of 27 Sep 2023 23:00  Patient On (Oxygen Delivery Method): ventilator    O2 Concentration (%): 50    I&O's Summary    27 Sep 2023 07:01  -  27 Sep 2023 23:06  --------------------------------------------------------  IN: 650 mL / OUT: 488 mL / NET: 162 mL      PHYSICAL EXAM  General: Alert and awake, following commands   Respiratory: CTA B/L; no wheezes/crackles  Cardiovascular: Regular rhythm/rate, no ectopies   Gastrointestinal: Soft; NTND   Extremities: WWP; no edema   Neurological: nonfocal     LABS/NG/EKG/ETC  Reviewed.

## 2023-09-27 NOTE — PROGRESS NOTE ADULT - PROBLEM SELECTOR PLAN 1
Presented to  w CP, cath w LM and double vessel CAD. Currently CP free, HD stable  -Echo 09/21/23 @ : EF 25%, Grade II DD, LA dilated, mod MR, mild AR, small pericardial effusion.  -EKG AV block 1st deg, RBBB, TWI V4-V6  -Trop I @ : 0.30>0.18>0.31, Trop @ [facility] 40  -Cardiac cath 9/24/23 @: LM and double vessel CAD   -s/p cardiac cath 9/25/23: LM distal calcified 80% stenosis, oLAD 80% calcified stenosis, oCLCx 80%  calcified stensis. IVUS LM 3.7-4.4 MCA.   -C/w ASA/statin   - Hold Plavix pending CABG 9/27, will transfer to CT surgery post CABG  -NPO after midnight, T+S x 2 ordered Presented to  w CP, cath w LM and double vessel CAD. Currently CP free, HD stable  -Echo 09/21/23 @ : EF 25%, Grade II DD, LA dilated, mod MR, mild AR, small pericardial effusion.  -EKG AV block 1st deg, RBBB, TWI V4-V6  -Trop I @ : 0.30>0.18>0.31, Trop @ [facility] 40  -Cardiac cath 9/24/23 @: LM and double vessel CAD   -s/p cardiac cath 9/25/23: LM distal calcified 80% stenosis, oLAD 80% calcified stenosis, oCLCx 80%  calcified stenosis. IVUS LM 3.7-4.4 MCA.   -C/w ASA/statin   - Hold Plavix pending CABG 9/27, will transfer to CT surgery post CABG  -NPO, T+S x 2 ordered and resulted

## 2023-09-27 NOTE — PROGRESS NOTE ADULT - PROBLEM SELECTOR PLAN 5
On metformin at home, A1c: 6.8  -c/w ISS      F: None  E: Replete if K<4 or Mag<2  N: DASH Diet  Dispo: Pending CABG    Case discussed with Dr. Galeana On metformin at home, A1c: 6.8  -c/w ISS      F: None  E: Replete if K<4 or Mag<2  N: NPO  Dispo: Pending CABG    Case discussed with Dr. Galeana

## 2023-09-27 NOTE — PROGRESS NOTE ADULT - SUBJECTIVE AND OBJECTIVE BOX
Cardiology PA Adult Progress Note    *INCOMPLETE NOTE     SUBJECTIVE ASSESSMENT: Patient seen resting comfortably at bedside this morning with multiple family members present. Patient denies SOB, CP, n/v/d, burning with urination and continues to endorse 3/10 headache.     ROS negative unless stated in HPI.   	  MEDICATIONS:  metoprolol succinate ER 50 milliGRAM(s) Oral daily  sacubitril 24 mG/valsartan 26 mG 1 Tablet(s) Oral two times a day  spironolactone 25 milliGRAM(s) Oral daily    cefTRIAXone   IVPB 1000 milliGRAM(s) IV Intermittent every 24 hours      acetaminophen     Tablet .. 650 milliGRAM(s) Oral every 6 hours PRN  acetaminophen     Tablet .. 1000 milliGRAM(s) Oral once      atorvastatin 40 milliGRAM(s) Oral at bedtime  dapagliflozin 10 milliGRAM(s) Oral every 24 hours  dextrose 50% Injectable 12.5 Gram(s) IV Push once  dextrose 50% Injectable 25 Gram(s) IV Push once  dextrose 50% Injectable 25 Gram(s) IV Push once  dextrose Oral Gel 15 Gram(s) Oral once PRN  glucagon  Injectable 1 milliGRAM(s) IntraMuscular once  insulin lispro (ADMELOG) corrective regimen sliding scale   SubCutaneous three times a day before meals  insulin lispro (ADMELOG) corrective regimen sliding scale   SubCutaneous at bedtime    aspirin enteric coated 81 milliGRAM(s) Oral daily  dextrose 5%. 1000 milliLiter(s) IV Continuous <Continuous>  dextrose 5%. 1000 milliLiter(s) IV Continuous <Continuous>  fluticasone propionate 50 MICROgram(s)/spray Nasal Spray 1 Spray(s) Both Nostrils two times a day  heparin   Injectable 4400 Unit(s) IV Push every 6 hours PRN  heparin  Infusion.  Unit(s)/Hr IV Continuous <Continuous>  influenza  Vaccine (HIGH DOSE) 0.7 milliLiter(s) IntraMuscular once  mupirocin 2% Ointment 1 Application(s) Both Nostrils two times a day  sodium chloride 0.9%. 500 milliLiter(s) IV Continuous <Continuous>    	  VITAL SIGNS:  T(C): 36.7 (09-27-23 @ 04:58), Max: 36.8 (09-26-23 @ 20:45)  HR: 58 (09-27-23 @ 04:58) (56 - 58)  BP: 120/56 (09-27-23 @ 04:58) (113/61 - 120/56)  RR: 18 (09-27-23 @ 04:58) (16 - 18)  SpO2: 96% (09-27-23 @ 04:58) (94% - 97%)  Wt(kg): --    I&O's Summary    26 Sep 2023 07:01  -  27 Sep 2023 07:00  --------------------------------------------------------  IN: 282 mL / OUT: 1 mL / NET: 281 mL                                           PHYSICAL EXAM:  Appearance: Normal	  HEENT: Normal oral mucosa, PERRL, EOMI	  Neck: Supple, -JVD  Cardiovascular: Normal S1 S2, No murmurs  Respiratory: Lungs clear to auscultation, No RRW   Gastrointestinal:  Soft, Non-tender, + BS	  Skin: No rashes, No ecchymoses, No cyanosis  Extremities: Normal range of motion, No clubbing, cyanosis or edema  Vascular: Peripheral pulses palpable 2+ bilaterally  Neurologic: Non-focal  Psychiatry: A & O x 3, Mood & affect appropriate    LABS:	 	                                  12.7   4.11  )-----------( 235      ( 27 Sep 2023 05:30 )             38.5     09-27    139  |  103  |  18  ----------------------------<  199<H>  4.4   |  24  |  0.83    Ca    10.1      27 Sep 2023 05:30  Mg     1.9     09-27    TPro  7.4  /  Alb  4.2  /  TBili  0.6  /  DBili  x   /  AST  26  /  ALT  43  /  AlkPhos  64  09-27    proBNP:   Lipid Profile:   HgA1c:   TSH: Thyroid Stimulating Hormone, Serum: 1.980 uIU/mL (09-26 @ 14:00)    PT/INR - ( 27 Sep 2023 05:30 )   PT: 11.7 sec;   INR: 1.03          PTT - ( 27 Sep 2023 05:30 )  PTT:69.1 sec Cardiology PA Adult Progress Note    **TRANSFER TO CARDIOLOGY**  SUBJECTIVE ASSESSMENT: Patient seen resting comfortably at bedside this morning with multiple family members present. Patient denies SOB, CP, n/v/d, burning with urination and continues to endorse 3/10 headache.     ROS negative unless stated in HPI.   	  MEDICATIONS:  metoprolol succinate ER 50 milliGRAM(s) Oral daily  sacubitril 24 mG/valsartan 26 mG 1 Tablet(s) Oral two times a day  spironolactone 25 milliGRAM(s) Oral daily    cefTRIAXone   IVPB 1000 milliGRAM(s) IV Intermittent every 24 hours      acetaminophen     Tablet .. 650 milliGRAM(s) Oral every 6 hours PRN  acetaminophen     Tablet .. 1000 milliGRAM(s) Oral once      atorvastatin 40 milliGRAM(s) Oral at bedtime  dapagliflozin 10 milliGRAM(s) Oral every 24 hours  dextrose 50% Injectable 12.5 Gram(s) IV Push once  dextrose 50% Injectable 25 Gram(s) IV Push once  dextrose 50% Injectable 25 Gram(s) IV Push once  dextrose Oral Gel 15 Gram(s) Oral once PRN  glucagon  Injectable 1 milliGRAM(s) IntraMuscular once  insulin lispro (ADMELOG) corrective regimen sliding scale   SubCutaneous three times a day before meals  insulin lispro (ADMELOG) corrective regimen sliding scale   SubCutaneous at bedtime    aspirin enteric coated 81 milliGRAM(s) Oral daily  dextrose 5%. 1000 milliLiter(s) IV Continuous <Continuous>  dextrose 5%. 1000 milliLiter(s) IV Continuous <Continuous>  fluticasone propionate 50 MICROgram(s)/spray Nasal Spray 1 Spray(s) Both Nostrils two times a day  heparin   Injectable 4400 Unit(s) IV Push every 6 hours PRN  heparin  Infusion.  Unit(s)/Hr IV Continuous <Continuous>  influenza  Vaccine (HIGH DOSE) 0.7 milliLiter(s) IntraMuscular once  mupirocin 2% Ointment 1 Application(s) Both Nostrils two times a day  sodium chloride 0.9%. 500 milliLiter(s) IV Continuous <Continuous>    	  VITAL SIGNS:  T(C): 36.7 (09-27-23 @ 04:58), Max: 36.8 (09-26-23 @ 20:45)  HR: 58 (09-27-23 @ 04:58) (56 - 58)  BP: 120/56 (09-27-23 @ 04:58) (113/61 - 120/56)  RR: 18 (09-27-23 @ 04:58) (16 - 18)  SpO2: 96% (09-27-23 @ 04:58) (94% - 97%)  Wt(kg): --    I&O's Summary    26 Sep 2023 07:01  -  27 Sep 2023 07:00  --------------------------------------------------------  IN: 282 mL / OUT: 1 mL / NET: 281 mL                                           PHYSICAL EXAM:  Appearance: Normal	  HEENT: Normal oral mucosa, PERRL, EOMI	  Neck: Supple, -JVD  Cardiovascular: Normal S1 S2, No murmurs  Respiratory: Lungs clear to auscultation, No RRW   Gastrointestinal:  Soft, Non-tender, + BS	  Skin: No rashes, No ecchymoses, No cyanosis  Extremities: Normal range of motion, No clubbing, cyanosis or edema  Vascular: Peripheral pulses palpable 2+ bilaterally  Neurologic: Non-focal  Psychiatry: A & O x 3, Mood & affect appropriate    LABS:	 	                                  12.7   4.11  )-----------( 235      ( 27 Sep 2023 05:30 )             38.5     09-27    139  |  103  |  18  ----------------------------<  199<H>  4.4   |  24  |  0.83    Ca    10.1      27 Sep 2023 05:30  Mg     1.9     09-27    TPro  7.4  /  Alb  4.2  /  TBili  0.6  /  DBili  x   /  AST  26  /  ALT  43  /  AlkPhos  64  09-27    proBNP:   Lipid Profile:   HgA1c:   TSH: Thyroid Stimulating Hormone, Serum: 1.980 uIU/mL (09-26 @ 14:00)    PT/INR - ( 27 Sep 2023 05:30 )   PT: 11.7 sec;   INR: 1.03          PTT - ( 27 Sep 2023 05:30 )  PTT:69.1 sec Cardiology PA Adult Progress Note    **TRANSFER TO CT SURGERY***  SUBJECTIVE ASSESSMENT: Patient seen resting comfortably at bedside this morning with multiple family members present. Patient denies SOB, CP, n/v/d, burning with urination and continues to endorse 3/10 headache.     ROS negative unless stated in HPI.   	  MEDICATIONS:  metoprolol succinate ER 50 milliGRAM(s) Oral daily  sacubitril 24 mG/valsartan 26 mG 1 Tablet(s) Oral two times a day  spironolactone 25 milliGRAM(s) Oral daily    cefTRIAXone   IVPB 1000 milliGRAM(s) IV Intermittent every 24 hours      acetaminophen     Tablet .. 650 milliGRAM(s) Oral every 6 hours PRN  acetaminophen     Tablet .. 1000 milliGRAM(s) Oral once      atorvastatin 40 milliGRAM(s) Oral at bedtime  dapagliflozin 10 milliGRAM(s) Oral every 24 hours  dextrose 50% Injectable 12.5 Gram(s) IV Push once  dextrose 50% Injectable 25 Gram(s) IV Push once  dextrose 50% Injectable 25 Gram(s) IV Push once  dextrose Oral Gel 15 Gram(s) Oral once PRN  glucagon  Injectable 1 milliGRAM(s) IntraMuscular once  insulin lispro (ADMELOG) corrective regimen sliding scale   SubCutaneous three times a day before meals  insulin lispro (ADMELOG) corrective regimen sliding scale   SubCutaneous at bedtime    aspirin enteric coated 81 milliGRAM(s) Oral daily  dextrose 5%. 1000 milliLiter(s) IV Continuous <Continuous>  dextrose 5%. 1000 milliLiter(s) IV Continuous <Continuous>  fluticasone propionate 50 MICROgram(s)/spray Nasal Spray 1 Spray(s) Both Nostrils two times a day  heparin   Injectable 4400 Unit(s) IV Push every 6 hours PRN  heparin  Infusion.  Unit(s)/Hr IV Continuous <Continuous>  influenza  Vaccine (HIGH DOSE) 0.7 milliLiter(s) IntraMuscular once  mupirocin 2% Ointment 1 Application(s) Both Nostrils two times a day  sodium chloride 0.9%. 500 milliLiter(s) IV Continuous <Continuous>    	  VITAL SIGNS:  T(C): 36.7 (09-27-23 @ 04:58), Max: 36.8 (09-26-23 @ 20:45)  HR: 58 (09-27-23 @ 04:58) (56 - 58)  BP: 120/56 (09-27-23 @ 04:58) (113/61 - 120/56)  RR: 18 (09-27-23 @ 04:58) (16 - 18)  SpO2: 96% (09-27-23 @ 04:58) (94% - 97%)  Wt(kg): --    I&O's Summary    26 Sep 2023 07:01  -  27 Sep 2023 07:00  --------------------------------------------------------  IN: 282 mL / OUT: 1 mL / NET: 281 mL                                           PHYSICAL EXAM:  Appearance: Normal	  HEENT: Normal oral mucosa, PERRL, EOMI	  Neck: Supple, -JVD  Cardiovascular: Normal S1 S2, No murmurs  Respiratory: Lungs clear to auscultation, No RRW   Gastrointestinal:  Soft, Non-tender, + BS	  Skin: No rashes, No ecchymoses, No cyanosis  Extremities: Normal range of motion, No clubbing, cyanosis or edema  Vascular: Peripheral pulses palpable 2+ bilaterally  Neurologic: Non-focal  Psychiatry: A & O x 3, Mood & affect appropriate    LABS:	 	                                  12.7   4.11  )-----------( 235      ( 27 Sep 2023 05:30 )             38.5     09-27    139  |  103  |  18  ----------------------------<  199<H>  4.4   |  24  |  0.83    Ca    10.1      27 Sep 2023 05:30  Mg     1.9     09-27    TPro  7.4  /  Alb  4.2  /  TBili  0.6  /  DBili  x   /  AST  26  /  ALT  43  /  AlkPhos  64  09-27    proBNP:   Lipid Profile:   HgA1c:   TSH: Thyroid Stimulating Hormone, Serum: 1.980 uIU/mL (09-26 @ 14:00)    PT/INR - ( 27 Sep 2023 05:30 )   PT: 11.7 sec;   INR: 1.03          PTT - ( 27 Sep 2023 05:30 )  PTT:69.1 sec

## 2023-09-27 NOTE — BRIEF OPERATIVE NOTE - NSICDXBRIEFPROCEDURE_GEN_ALL_CORE_FT
PROCEDURES:  CABG, with HESHAM 27-Sep-2023 18:36:42 OPCAB x 2 LIMA-LAD SVG- OM EF 25% Valerie Caceres N

## 2023-09-27 NOTE — PROGRESS NOTE ADULT - PROBLEM SELECTOR PLAN 4
Pt endorsed increased urinary frequency and burning with urination.  UA 9/26 reveals + Bacteria, + Nitrites, + Leuks  -start 1g Ceftriaxone IV x 3 days Pt endorsed increased urinary frequency and burning with urination.  UA 9/26 reveals + Bacteria, + Nitrites, + Leuks  -continue with 1g Ceftriaxone IV x 3 days (last dose 9/28/23)

## 2023-09-27 NOTE — PROGRESS NOTE ADULT - ASSESSMENT
83 F w/PMHx HTN, DM, Breast cancer post lumpectomy in remission and CAD (s/p ISAAC x 3 @ [facility] most recently 2008), VT arrest s/p ICD placement in 2019 by Dr. Sesay. Presented to [facility] on 09/21 after her ICD fired- with anginal symptoms and elevated trops ruled in fo NSTEMI, now s/p Cardiac Cath on 09/23/23 revealing LM and double vessel CAD. Subsequently transferred to [facility] for surgical intervention.  S/p OPCAB x 2 LIMA-LAD SVG- OM EF 25%    9/27  Intraop 2.5 L crystalloids/ 1uPC   Received intubated on low dose levophed  Woke up nonfocal   A/p   In sinus, vasoplegic on levo  Labs with increasing base deficit/ warm but low UOP/ low CVP 1-2  Clinically compensated from HF stand point would tolerate gentle boluses of IV fluid-- titrate to UOP   Starting insulin gtt for hyperglycemia   Monitor serial ABGs  H&H in good range/ CTs and blakes output low   Continue DAPT/ Statin and ICU ppx  CPAP for extubation and routine post extubation care    ATTENDING: I have personally and independently provided 105 min of critical care services.

## 2023-09-27 NOTE — PROGRESS NOTE ADULT - ASSESSMENT
84yo F with PMHx HTN, HLD, MI (2005), DM, CAD (s/p ISAAC x 3 @ [facility] most recently 2008), VT arrest (intubation and subsequent ICD placement in 2019 by Dr. Sesay), breast cancer (s/p lumpectomy and chemo - in remission) who initially presented to [facility] on 09/21/23 after her ICD fired x 1 with associated CP and dizziness and elevated trop, r/i NSTEMI now s/p cardiac cath @  on 09/23/23 revealing LM and double vessel CAD and was subsequently transferred to [facility] on 09/24/23 for high risk cardiac cath with Dr. Wright. Pt is now s/p cardiac cath on 09/25/23 with severe stenosis LM, CTS consulted for evaluation for surgical revascularization and plan for CABG tomorrow 9/27/23 with Dr. Ramirez.    82yo F with PMHx HTN, HLD, MI (2005), DM, CAD (s/p ISAAC x 3 @ [facility] most recently 2008), VT arrest (intubation and subsequent ICD placement in 2019 by Dr. Sesay), breast cancer (s/p lumpectomy and chemo - in remission) who initially presented to [facility] on 09/21/23 after her ICD fired x 1 with associated CP and dizziness and elevated trop, r/i NSTEMI now s/p cardiac cath @  on 09/23/23 revealing LM and double vessel CAD and was subsequently transferred to [facility] on 09/24/23 for high risk cardiac cath with Dr. Wright. Pt is now s/p cardiac cath on 09/25/23 with severe stenosis LM, CTS consulted for evaluation for surgical revascularization and plan for CABG today 9/27/23 with Dr. Ramirez. Pt will be transferred to [facility] post procedure.

## 2023-09-27 NOTE — PRE-ANESTHESIA EVALUATION ADULT - NSDENTALSD_ENT_ALL_CORE
Had direct contact with patient who wearing a mask, ambulated independently approximately 750 feet, when asked, pt reported feeling 5/10 chest pressure since stents were placed but not as intense as it felt prior to stents.. States she forgot to tell physician when she saw her this am. RN was notified patient would be walking. Patient educated on the benefits of exercise and cardiac rehab. Cardiac Rehab brochure and contact information provided. Educated patient on cardiac diagnosis. Discussed cardiac risk factors/reduction, nutrition, smoking, admits to smoking marijuana, medication compliance and phase 2 orientation information. Home exercise prescription reviewed with patient. Patient has follow up appt already scheduled so we are proceeding with cardiac rehab enrollment process.  Patient verbalized understanding of all discussed. Patient was returned to bed, handoff given to RN. This patient was in our cardiac rehab program a few years ago so we are familiar with her.    caps/bridge/implants/missing teeth

## 2023-09-28 LAB
ALBUMIN SERPL ELPH-MCNC: 3.9 G/DL — SIGNIFICANT CHANGE UP (ref 3.3–5)
ALP SERPL-CCNC: 33 U/L — LOW (ref 40–120)
ALT FLD-CCNC: 28 U/L — SIGNIFICANT CHANGE UP (ref 10–45)
ANION GAP SERPL CALC-SCNC: 11 MMOL/L — SIGNIFICANT CHANGE UP (ref 5–17)
ANION GAP SERPL CALC-SCNC: 15 MMOL/L — SIGNIFICANT CHANGE UP (ref 5–17)
APTT BLD: 28 SEC — SIGNIFICANT CHANGE UP (ref 24.5–35.6)
APTT BLD: 29.5 SEC — SIGNIFICANT CHANGE UP (ref 24.5–35.6)
AST SERPL-CCNC: 23 U/L — SIGNIFICANT CHANGE UP (ref 10–40)
BASOPHILS # BLD AUTO: 0.05 K/UL — SIGNIFICANT CHANGE UP (ref 0–0.2)
BASOPHILS NFR BLD AUTO: 0.4 % — SIGNIFICANT CHANGE UP (ref 0–2)
BILIRUB SERPL-MCNC: 1 MG/DL — SIGNIFICANT CHANGE UP (ref 0.2–1.2)
BUN SERPL-MCNC: 18 MG/DL — SIGNIFICANT CHANGE UP (ref 7–23)
BUN SERPL-MCNC: 20 MG/DL — SIGNIFICANT CHANGE UP (ref 7–23)
CALCIUM SERPL-MCNC: 8.3 MG/DL — LOW (ref 8.4–10.5)
CALCIUM SERPL-MCNC: 9.1 MG/DL — SIGNIFICANT CHANGE UP (ref 8.4–10.5)
CHLORIDE SERPL-SCNC: 102 MMOL/L — SIGNIFICANT CHANGE UP (ref 96–108)
CHLORIDE SERPL-SCNC: 103 MMOL/L — SIGNIFICANT CHANGE UP (ref 96–108)
CO2 SERPL-SCNC: 22 MMOL/L — SIGNIFICANT CHANGE UP (ref 22–31)
CO2 SERPL-SCNC: 23 MMOL/L — SIGNIFICANT CHANGE UP (ref 22–31)
CREAT SERPL-MCNC: 0.75 MG/DL — SIGNIFICANT CHANGE UP (ref 0.5–1.3)
CREAT SERPL-MCNC: 0.77 MG/DL — SIGNIFICANT CHANGE UP (ref 0.5–1.3)
EGFR: 76 ML/MIN/1.73M2 — SIGNIFICANT CHANGE UP
EGFR: 79 ML/MIN/1.73M2 — SIGNIFICANT CHANGE UP
EOSINOPHIL # BLD AUTO: 0 K/UL — SIGNIFICANT CHANGE UP (ref 0–0.5)
EOSINOPHIL NFR BLD AUTO: 0 % — SIGNIFICANT CHANGE UP (ref 0–6)
GAS PNL BLDA: SIGNIFICANT CHANGE UP
GLUCOSE BLDC GLUCOMTR-MCNC: 118 MG/DL — HIGH (ref 70–99)
GLUCOSE BLDC GLUCOMTR-MCNC: 118 MG/DL — HIGH (ref 70–99)
GLUCOSE BLDC GLUCOMTR-MCNC: 119 MG/DL — HIGH (ref 70–99)
GLUCOSE BLDC GLUCOMTR-MCNC: 127 MG/DL — HIGH (ref 70–99)
GLUCOSE BLDC GLUCOMTR-MCNC: 143 MG/DL — HIGH (ref 70–99)
GLUCOSE BLDC GLUCOMTR-MCNC: 152 MG/DL — HIGH (ref 70–99)
GLUCOSE BLDC GLUCOMTR-MCNC: 176 MG/DL — HIGH (ref 70–99)
GLUCOSE BLDC GLUCOMTR-MCNC: 216 MG/DL — HIGH (ref 70–99)
GLUCOSE BLDC GLUCOMTR-MCNC: 217 MG/DL — HIGH (ref 70–99)
GLUCOSE SERPL-MCNC: 151 MG/DL — HIGH (ref 70–99)
GLUCOSE SERPL-MCNC: 235 MG/DL — HIGH (ref 70–99)
HCT VFR BLD CALC: 28.1 % — LOW (ref 34.5–45)
HCT VFR BLD CALC: 28.1 % — LOW (ref 34.5–45)
HGB BLD-MCNC: 9.6 G/DL — LOW (ref 11.5–15.5)
HGB BLD-MCNC: 9.7 G/DL — LOW (ref 11.5–15.5)
IMM GRANULOCYTES NFR BLD AUTO: 1.3 % — HIGH (ref 0–0.9)
INR BLD: 1.22 — HIGH (ref 0.85–1.18)
INR BLD: 1.23 — HIGH (ref 0.85–1.18)
LACTATE SERPL-SCNC: 1.5 MMOL/L — SIGNIFICANT CHANGE UP (ref 0.5–2)
LYMPHOCYTES # BLD AUTO: 0.91 K/UL — LOW (ref 1–3.3)
LYMPHOCYTES # BLD AUTO: 7.1 % — LOW (ref 13–44)
MAGNESIUM SERPL-MCNC: 1.8 MG/DL — SIGNIFICANT CHANGE UP (ref 1.6–2.6)
MAGNESIUM SERPL-MCNC: 2.2 MG/DL — SIGNIFICANT CHANGE UP (ref 1.6–2.6)
MCHC RBC-ENTMCNC: 31.4 PG — SIGNIFICANT CHANGE UP (ref 27–34)
MCHC RBC-ENTMCNC: 31.6 PG — SIGNIFICANT CHANGE UP (ref 27–34)
MCHC RBC-ENTMCNC: 34.2 GM/DL — SIGNIFICANT CHANGE UP (ref 32–36)
MCHC RBC-ENTMCNC: 34.5 GM/DL — SIGNIFICANT CHANGE UP (ref 32–36)
MCV RBC AUTO: 91.5 FL — SIGNIFICANT CHANGE UP (ref 80–100)
MCV RBC AUTO: 91.8 FL — SIGNIFICANT CHANGE UP (ref 80–100)
MONOCYTES # BLD AUTO: 0.91 K/UL — HIGH (ref 0–0.9)
MONOCYTES NFR BLD AUTO: 7.1 % — SIGNIFICANT CHANGE UP (ref 2–14)
NEUTROPHILS # BLD AUTO: 10.75 K/UL — HIGH (ref 1.8–7.4)
NEUTROPHILS NFR BLD AUTO: 84.1 % — HIGH (ref 43–77)
NRBC # BLD: 0 /100 WBCS — SIGNIFICANT CHANGE UP (ref 0–0)
NRBC # BLD: 0 /100 WBCS — SIGNIFICANT CHANGE UP (ref 0–0)
PHOSPHATE SERPL-MCNC: 3.3 MG/DL — SIGNIFICANT CHANGE UP (ref 2.5–4.5)
PHOSPHATE SERPL-MCNC: 4 MG/DL — SIGNIFICANT CHANGE UP (ref 2.5–4.5)
PLATELET # BLD AUTO: 143 K/UL — LOW (ref 150–400)
PLATELET # BLD AUTO: 144 K/UL — LOW (ref 150–400)
POTASSIUM SERPL-MCNC: 3.5 MMOL/L — SIGNIFICANT CHANGE UP (ref 3.5–5.3)
POTASSIUM SERPL-MCNC: 4.2 MMOL/L — SIGNIFICANT CHANGE UP (ref 3.5–5.3)
POTASSIUM SERPL-SCNC: 3.5 MMOL/L — SIGNIFICANT CHANGE UP (ref 3.5–5.3)
POTASSIUM SERPL-SCNC: 4.2 MMOL/L — SIGNIFICANT CHANGE UP (ref 3.5–5.3)
PROT SERPL-MCNC: 5.6 G/DL — LOW (ref 6–8.3)
PROTHROM AB SERPL-ACNC: 13.8 SEC — HIGH (ref 9.5–13)
PROTHROM AB SERPL-ACNC: 13.9 SEC — HIGH (ref 9.5–13)
RBC # BLD: 3.06 M/UL — LOW (ref 3.8–5.2)
RBC # BLD: 3.07 M/UL — LOW (ref 3.8–5.2)
RBC # FLD: 15.8 % — HIGH (ref 10.3–14.5)
RBC # FLD: 16.2 % — HIGH (ref 10.3–14.5)
SODIUM SERPL-SCNC: 136 MMOL/L — SIGNIFICANT CHANGE UP (ref 135–145)
SODIUM SERPL-SCNC: 140 MMOL/L — SIGNIFICANT CHANGE UP (ref 135–145)
WBC # BLD: 12.79 K/UL — HIGH (ref 3.8–10.5)
WBC # BLD: 9.52 K/UL — SIGNIFICANT CHANGE UP (ref 3.8–10.5)
WBC # FLD AUTO: 12.79 K/UL — HIGH (ref 3.8–10.5)
WBC # FLD AUTO: 9.52 K/UL — SIGNIFICANT CHANGE UP (ref 3.8–10.5)

## 2023-09-28 PROCEDURE — 99232 SBSQ HOSP IP/OBS MODERATE 35: CPT

## 2023-09-28 PROCEDURE — 99232 SBSQ HOSP IP/OBS MODERATE 35: CPT | Mod: 24

## 2023-09-28 PROCEDURE — 71045 X-RAY EXAM CHEST 1 VIEW: CPT | Mod: 26,77

## 2023-09-28 PROCEDURE — 71045 X-RAY EXAM CHEST 1 VIEW: CPT | Mod: 26

## 2023-09-28 RX ORDER — SODIUM CHLORIDE 9 MG/ML
1000 INJECTION, SOLUTION INTRAVENOUS
Refills: 0 | Status: DISCONTINUED | OUTPATIENT
Start: 2023-09-28 | End: 2023-10-02

## 2023-09-28 RX ORDER — DEXTROSE 50 % IN WATER 50 %
25 SYRINGE (ML) INTRAVENOUS ONCE
Refills: 0 | Status: DISCONTINUED | OUTPATIENT
Start: 2023-09-28 | End: 2023-10-02

## 2023-09-28 RX ORDER — DEXTROSE 50 % IN WATER 50 %
12.5 SYRINGE (ML) INTRAVENOUS ONCE
Refills: 0 | Status: DISCONTINUED | OUTPATIENT
Start: 2023-09-28 | End: 2023-10-02

## 2023-09-28 RX ORDER — ONDANSETRON 8 MG/1
4 TABLET, FILM COATED ORAL ONCE
Refills: 0 | Status: COMPLETED | OUTPATIENT
Start: 2023-09-28 | End: 2023-09-28

## 2023-09-28 RX ORDER — POTASSIUM CHLORIDE 20 MEQ
20 PACKET (EA) ORAL ONCE
Refills: 0 | Status: COMPLETED | OUTPATIENT
Start: 2023-09-28 | End: 2023-09-28

## 2023-09-28 RX ORDER — FUROSEMIDE 40 MG
10 TABLET ORAL ONCE
Refills: 0 | Status: COMPLETED | OUTPATIENT
Start: 2023-09-28 | End: 2023-09-28

## 2023-09-28 RX ORDER — OXYCODONE HYDROCHLORIDE 5 MG/1
10 TABLET ORAL EVERY 6 HOURS
Refills: 0 | Status: DISCONTINUED | OUTPATIENT
Start: 2023-09-28 | End: 2023-10-02

## 2023-09-28 RX ORDER — DEXTROSE 50 % IN WATER 50 %
15 SYRINGE (ML) INTRAVENOUS ONCE
Refills: 0 | Status: DISCONTINUED | OUTPATIENT
Start: 2023-09-28 | End: 2023-10-02

## 2023-09-28 RX ORDER — OXYCODONE HYDROCHLORIDE 5 MG/1
5 TABLET ORAL EVERY 4 HOURS
Refills: 0 | Status: DISCONTINUED | OUTPATIENT
Start: 2023-09-28 | End: 2023-10-02

## 2023-09-28 RX ORDER — OXYCODONE HYDROCHLORIDE 5 MG/1
5 TABLET ORAL EVERY 6 HOURS
Refills: 0 | Status: DISCONTINUED | OUTPATIENT
Start: 2023-09-28 | End: 2023-09-28

## 2023-09-28 RX ORDER — MAGNESIUM SULFATE 500 MG/ML
2 VIAL (ML) INJECTION ONCE
Refills: 0 | Status: COMPLETED | OUTPATIENT
Start: 2023-09-28 | End: 2023-09-28

## 2023-09-28 RX ORDER — GLUCAGON INJECTION, SOLUTION 0.5 MG/.1ML
1 INJECTION, SOLUTION SUBCUTANEOUS ONCE
Refills: 0 | Status: DISCONTINUED | OUTPATIENT
Start: 2023-09-28 | End: 2023-10-02

## 2023-09-28 RX ORDER — INSULIN LISPRO 100/ML
VIAL (ML) SUBCUTANEOUS
Refills: 0 | Status: DISCONTINUED | OUTPATIENT
Start: 2023-09-28 | End: 2023-10-02

## 2023-09-28 RX ORDER — SODIUM CHLORIDE 9 MG/ML
3 INJECTION INTRAMUSCULAR; INTRAVENOUS; SUBCUTANEOUS EVERY 8 HOURS
Refills: 0 | Status: DISCONTINUED | OUTPATIENT
Start: 2023-09-28 | End: 2023-10-02

## 2023-09-28 RX ADMIN — FENTANYL CITRATE 25 MICROGRAM(S): 50 INJECTION INTRAVENOUS at 00:41

## 2023-09-28 RX ADMIN — CLOPIDOGREL BISULFATE 75 MILLIGRAM(S): 75 TABLET, FILM COATED ORAL at 11:34

## 2023-09-28 RX ADMIN — SODIUM CHLORIDE 10 MILLILITER(S): 9 INJECTION INTRAMUSCULAR; INTRAVENOUS; SUBCUTANEOUS at 10:16

## 2023-09-28 RX ADMIN — Medication 500 MILLIGRAM(S): at 10:43

## 2023-09-28 RX ADMIN — POLYETHYLENE GLYCOL 3350 17 GRAM(S): 17 POWDER, FOR SOLUTION ORAL at 11:34

## 2023-09-28 RX ADMIN — SENNA PLUS 2 TABLET(S): 8.6 TABLET ORAL at 22:23

## 2023-09-28 RX ADMIN — Medication 25 GRAM(S): at 13:40

## 2023-09-28 RX ADMIN — Medication 400 MILLIGRAM(S): at 11:34

## 2023-09-28 RX ADMIN — PANTOPRAZOLE SODIUM 40 MILLIGRAM(S): 20 TABLET, DELAYED RELEASE ORAL at 11:33

## 2023-09-28 RX ADMIN — Medication 4: at 11:46

## 2023-09-28 RX ADMIN — Medication 10 MILLIGRAM(S): at 10:15

## 2023-09-28 RX ADMIN — Medication 1000 MILLIGRAM(S): at 06:15

## 2023-09-28 RX ADMIN — OXYCODONE HYDROCHLORIDE 5 MILLIGRAM(S): 5 TABLET ORAL at 12:51

## 2023-09-28 RX ADMIN — Medication 4: at 17:10

## 2023-09-28 RX ADMIN — MUPIROCIN 1 APPLICATION(S): 20 OINTMENT TOPICAL at 06:47

## 2023-09-28 RX ADMIN — OXYCODONE HYDROCHLORIDE 5 MILLIGRAM(S): 5 TABLET ORAL at 12:36

## 2023-09-28 RX ADMIN — Medication 400 MILLIGRAM(S): at 17:59

## 2023-09-28 RX ADMIN — Medication 100 MILLIEQUIVALENT(S): at 03:45

## 2023-09-28 RX ADMIN — ONDANSETRON 4 MILLIGRAM(S): 8 TABLET, FILM COATED ORAL at 10:15

## 2023-09-28 RX ADMIN — HEPARIN SODIUM 5000 UNIT(S): 5000 INJECTION INTRAVENOUS; SUBCUTANEOUS at 06:02

## 2023-09-28 RX ADMIN — Medication 2: at 22:23

## 2023-09-28 RX ADMIN — ATORVASTATIN CALCIUM 40 MILLIGRAM(S): 80 TABLET, FILM COATED ORAL at 22:20

## 2023-09-28 RX ADMIN — Medication 1000 MILLIGRAM(S): at 12:34

## 2023-09-28 RX ADMIN — HEPARIN SODIUM 5000 UNIT(S): 5000 INJECTION INTRAVENOUS; SUBCUTANEOUS at 22:21

## 2023-09-28 RX ADMIN — MUPIROCIN 1 APPLICATION(S): 20 OINTMENT TOPICAL at 18:19

## 2023-09-28 RX ADMIN — Medication 100 MILLIGRAM(S): at 16:16

## 2023-09-28 RX ADMIN — HEPARIN SODIUM 5000 UNIT(S): 5000 INJECTION INTRAVENOUS; SUBCUTANEOUS at 13:29

## 2023-09-28 RX ADMIN — OXYCODONE HYDROCHLORIDE 5 MILLIGRAM(S): 5 TABLET ORAL at 08:06

## 2023-09-28 RX ADMIN — CHLORHEXIDINE GLUCONATE 1 APPLICATION(S): 213 SOLUTION TOPICAL at 11:35

## 2023-09-28 RX ADMIN — Medication 100 MILLIGRAM(S): at 23:46

## 2023-09-28 RX ADMIN — Medication 400 MILLIGRAM(S): at 06:00

## 2023-09-28 RX ADMIN — Medication 50 MILLIEQUIVALENT(S): at 00:01

## 2023-09-28 RX ADMIN — SODIUM CHLORIDE 3 MILLILITER(S): 9 INJECTION INTRAMUSCULAR; INTRAVENOUS; SUBCUTANEOUS at 21:06

## 2023-09-28 RX ADMIN — Medication 81 MILLIGRAM(S): at 11:34

## 2023-09-28 RX ADMIN — Medication 1000 MILLIGRAM(S): at 18:15

## 2023-09-28 RX ADMIN — Medication 500 MILLIGRAM(S): at 18:00

## 2023-09-28 RX ADMIN — FENTANYL CITRATE 25 MICROGRAM(S): 50 INJECTION INTRAVENOUS at 00:11

## 2023-09-28 RX ADMIN — Medication 100 MILLIGRAM(S): at 06:00

## 2023-09-28 NOTE — PROGRESS NOTE ADULT - SUBJECTIVE AND OBJECTIVE BOX
CTICU  CRITICAL  CARE  attending     Hand off received 					   Pertinent clinical, laboratory, radiographic, hemodynamic, echocardiographic, respiratory data, microbiologic data and chart were reviewed and analyzed frequently throughout the course of the day and night  Patient seen and examined with CTS/ SH attending at bedside      Pt is a 83y , Female, post op day # 1 s/p CABG x 2V; off pump; post op EF reported @ 25%    post op:    remains extubated  acute post hemorrhagic anemia    83yoF PMHx HTN, HLD, MI (2005), DM, CAD (s/p ISAAC x 3 @ [facility] most recently 2008), VT arrest (intubation and subsequent ICD placement in 2019 by Dr. Sesay), Breast cancer (s/p lumpectomy and chemo, in remission) presented to [facility] on 09/21/23 after her ICD fired x 1 with associated CP and dizziness and elevated trop r/i NSTEMI s/p Cardiac Cath @  on 09/23/23 revealing LM and double vessel CAD and was subsequently transferred to [facility]. Patient was admitted to [facility] on 09/24/23 for continous monitoring for NSTEMI and cardiac cath on 09/25/23 of LM disease.    NSTEMI (non-ST elevation myocardial infarction)    Diabetes mellitus    HFrEF (heart failure with reduced ejection fraction)    HLD (hyperlipidemia)    Acute UTI        , FAMILY HISTORY:  FH: CAD (coronary artery disease)  brother    PAST MEDICAL & SURGICAL HISTORY:  Diabetes mellitus      Hypercholesterolemia      Essential hypertension      Hx of breast cancer      CAD (coronary artery disease)  s/p MI & stent x1 in 05, x1 in 06 & x1 in 08 in [facility]      H/O: hysterectomy  2008      Status post coronary artery stent placement  x3, 2005, 2006, 2008      H/O lumpectomy  2001        Patient is a 83y old  Female who presents with a chief complaint of CAD;      (26 Sep 2023 16:46)      14 system review limited 2/2 post op morbidity    Vital signs, hemodynamic and respiratory parameters were reviewed from the bedside nursing flowsheet.  ICU Vital Signs Last 24 Hrs  T(C): 35.9 (28 Sep 2023 13:43), Max: 36.3 (27 Sep 2023 21:21)  T(F): 96.7 (28 Sep 2023 13:43), Max: 97.4 (27 Sep 2023 21:21)  HR: 61 (28 Sep 2023 15:00) (60 - 82)  BP: --  BP(mean): --  ABP: 108/28 (28 Sep 2023 15:00) (97/50 - 157/65)  ABP(mean): 49 (28 Sep 2023 15:00) (49 - 105)  RR: 18 (28 Sep 2023 15:00) (10 - 21)  SpO2: 99% (28 Sep 2023 15:00) (95% - 100%)    O2 Parameters below as of 28 Sep 2023 15:00  Patient On (Oxygen Delivery Method): nasal cannula w/ humidification  O2 Flow (L/min): 4        Adult Advanced Hemodynamics Last 24 Hrs  CVP(mm Hg): 15 (28 Sep 2023 14:00) (0 - 32)  CVP(cm H2O): --  CO: --  CI: --  PA: --  PA(mean): --  PCWP: --  SVR: --  SVRI: --  PVR: --  PVRI: --, ABG - ( 28 Sep 2023 12:49 )  pH, Arterial: 7.40  pH, Blood: x     /  pCO2: 38    /  pO2: 109   / HCO3: 24    / Base Excess: -1.1  /  SaO2: 98.0              Mode: AC/ CMV (Assist Control/ Continuous Mandatory Ventilation)  RR (machine): 16  TV (machine): 500  FiO2: 50  PEEP: 5  ITime: 1  MAP: 7  PIP: 15    Intake and output was reviewed and the fluid balance was calculated  Daily     Daily   I&O's Summary    27 Sep 2023 07:01  -  28 Sep 2023 07:00  --------------------------------------------------------  IN: 1729 mL / OUT: 1160 mL / NET: 569 mL    28 Sep 2023 07:01  -  28 Sep 2023 16:16  --------------------------------------------------------  IN: 270 mL / OUT: 885 mL / NET: -615 mL        All lines and drain sites were assessed  [facility]      POCT Blood Glucose.: 217 mg/dL (28 Sep 2023 11:42)    No acute change in mental status  Auscultation of the chest reveals equal bs  Abdomen is soft  Extremities are warm and well perfused  Wounds appear clean and unremarkable  Antibiotics are periop    labs  CBC Full  -  ( 28 Sep 2023 12:58 )  WBC Count : 9.52 K/uL  RBC Count : 3.06 M/uL  Hemoglobin : 9.6 g/dL  Hematocrit : 28.1 %  Platelet Count - Automated : 143 K/uL  Mean Cell Volume : 91.8 fl  Mean Cell Hemoglobin : 31.4 pg  Mean Cell Hemoglobin Concentration : 34.2 gm/dL  Auto Neutrophil # : x  Auto Lymphocyte # : x  Auto Monocyte # : x  Auto Eosinophil # : x  Auto Basophil # : x  Auto Neutrophil % : x  Auto Lymphocyte % : x  Auto Monocyte % : x  Auto Eosinophil % : x  Auto Basophil % : x    09-28    136  |  102  |  18  ----------------------------<  235<H>  4.2   |  23  |  0.75    Ca    8.3<L>      28 Sep 2023 12:58  Phos  4.0     09-28  Mg     1.8     09-28    TPro  5.6<L>  /  Alb  3.9  /  TBili  1.0  /  DBili  x   /  AST  23  /  ALT  28  /  AlkPhos  33<L>  09-28    PT/INR - ( 28 Sep 2023 12:58 )   PT: 13.9 sec;   INR: 1.23          PTT - ( 28 Sep 2023 12:58 )  PTT:29.5 sec  The current medications were reviewed   MEDICATIONS  (STANDING):  acetaminophen   IVPB .. 1000 milliGRAM(s) IV Intermittent every 6 hours  albumin human  5% IVPB 250 milliLiter(s) IV Intermittent once  ascorbic acid 500 milliGRAM(s) Oral two times a day  aspirin enteric coated 81 milliGRAM(s) Oral daily  atorvastatin 40 milliGRAM(s) Oral at bedtime  ceFAZolin   IVPB 2000 milliGRAM(s) IV Intermittent every 8 hours  chlorhexidine 2% Cloths 1 Application(s) Topical daily  clopidogrel Tablet 75 milliGRAM(s) Oral daily  dextrose 5%. 1000 milliLiter(s) (50 mL/Hr) IV Continuous <Continuous>  dextrose 5%. 1000 milliLiter(s) (100 mL/Hr) IV Continuous <Continuous>  dextrose 50% Injectable 12.5 Gram(s) IV Push once  dextrose 50% Injectable 25 Gram(s) IV Push once  dextrose 50% Injectable 25 Gram(s) IV Push once  glucagon  Injectable 1 milliGRAM(s) IntraMuscular once  heparin   Injectable 5000 Unit(s) SubCutaneous every 8 hours  insulin lispro (ADMELOG) corrective regimen sliding scale   SubCutaneous Before meals and at bedtime  mupirocin 2% Ointment 1 Application(s) Both Nostrils two times a day  pantoprazole    Tablet 40 milliGRAM(s) Oral daily  polyethylene glycol 3350 17 Gram(s) Oral daily  senna 2 Tablet(s) Oral at bedtime  sodium chloride 0.9% lock flush 3 milliLiter(s) IV Push every 8 hours  sodium chloride 0.9%. 1000 milliLiter(s) (10 mL/Hr) IV Continuous <Continuous>    MEDICATIONS  (PRN):  dextrose Oral Gel 15 Gram(s) Oral once PRN Blood Glucose LESS THAN 70 milliGRAM(s)/deciliter  fentaNYL    Injectable 25 MICROGram(s) IV Push every 3 hours PRN Severe Pain (7 - 10)  oxyCODONE    IR 5 milliGRAM(s) Oral every 4 hours PRN Moderate Pain (4 - 6)  oxyCODONE    IR 10 milliGRAM(s) Oral every 6 hours PRN Severe Pain (7 - 10)       PROBLEM LIST/ ASSESSMENT:  HEALTH ISSUES - PROBLEM Dx:  NSTEMI (non-ST elevation myocardial infarction)    Diabetes mellitus    HFrEF (heart failure with reduced ejection fraction)    HLD (hyperlipidemia)    Acute UTI        ,   Patient is a 83y old  Female who presents with a chief complaint of CAD; VESSEL DISEASE     (26 Sep 2023 16:46)     s/p OPCAB x 3V      My plan includes :    Maintain MAP >65-70  supplemental O2 as needed  Titrate Fio2 to maintain Sao2 >95%  Serial ABGs  analgesia for acute post thoracotomy pain    close hemodynamic, ventilatory and drain monitoring and management per post op routine    Monitor for arrhythmias and monitor parameters for organ perfusion  monitor neurologic status  Head of the bed should remain elevated to 45 deg .   chest PT and IS will be encouraged  monitor adequacy of oxygenation and ventilation and attempt to wean oxygen  Nutritional goals will be met using po eventually , ensure adequate caloric intake and montior the same  Stress ulcer and VTE prophylaxis will be achieved    Glycemic control is satisfactory  Electrolytes have been repleted as necessary and wound care has been carried out. Pain control has been achieved.   agressive physical therapy and early mobility and ambulation goals will be met   The family was updated about the course and plan  CRITICAL CARE TIME SPENT in evaluation and management, reassessments, review and interpretation of labs and x-rays, ventilator and hemodynamic management, formulating a plan and coordinating care: __30__ MIN.  Time does not include procedural time.  CTICU ATTENDING     					    Jose Hopkins MD

## 2023-09-28 NOTE — PHYSICAL THERAPY INITIAL EVALUATION ADULT - PERTINENT HX OF CURRENT PROBLEM, REHAB EVAL
83 F w/PMHx HTN, DM, Breast cancer post lumpectomy in remission and CAD (s/p ISAAC x 3 @ [facility] most recently 2008), VT arrest s/p ICD placement in 2019 by Dr. Sesay. Presented to [facility] on 09/21 after her ICD fired- with anginal symptoms and elevated trops ruled in of NSTEMI, now s/p Cardiac Cath on 09/23/23 revealing LM and double vessel CAD. Subsequently transferred to [facility] for surgical intervention.

## 2023-09-28 NOTE — PHYSICAL THERAPY INITIAL EVALUATION ADULT - GENERAL OBSERVATIONS, REHAB EVAL
Pt received in bedside chair in no acute distress, WILLIAM Anaya present t/o +IV +EKG +NC 4L +bernice +central line +2 blakes to wall suction +2 chest tubes to wall suction +jalloh +temporary pacemaker +SCDs +sternal incision dressing C/D/I

## 2023-09-28 NOTE — PROGRESS NOTE ADULT - SUBJECTIVE AND OBJECTIVE BOX
Patient discussed on morning rounds with Dr. Ramirez    Operation / Date: OPCAB x 2 on 9/27/23    SUBJECTIVE ASSESSMENT: Patient seen and examined at bedside.     Vital Signs Last 24 Hrs  T(C): 35.9 (28 Sep 2023 13:43), Max: 36.3 (27 Sep 2023 21:21)  T(F): 96.7 (28 Sep 2023 13:43), Max: 97.4 (27 Sep 2023 21:21)  HR: 64 (28 Sep 2023 16:55) (60 - 82)  BP: 120/59 (28 Sep 2023 16:55) (120/59 - 120/59)  BP(mean): 85 (28 Sep 2023 16:55) (85 - 85)  RR: 15 (28 Sep 2023 16:55) (10 - 21)  SpO2: 98% (28 Sep 2023 16:55) (95% - 100%)    Parameters below as of 28 Sep 2023 16:55  Patient On (Oxygen Delivery Method): nasal cannula w/ humidification  O2 Flow (L/min): 3    I&O's Detail    27 Sep 2023 07:01  -  28 Sep 2023 07:00  --------------------------------------------------------  IN:    Dexmedetomidine: 12.6 mL    IV PiggyBack: 1250 mL    Norepinephrine: 21.4 mL    sodium chloride 0.9%: 445 mL  Total IN: 1729 mL    OUT:    Chest Tube (mL): 195 mL    Chest Tube (mL): 25 mL    Drain (mL): 350 mL    Indwelling Catheter - Urethral (mL): 590 mL    Oral Fluid: 0 mL  Total OUT: 1160 mL    Total NET: 569 mL    28 Sep 2023 07:01  -  28 Sep 2023 17:05  --------------------------------------------------------  IN:    Oral Fluid: 580 mL    sodium chloride 0.9%: 70 mL  Total IN: 650 mL    OUT:    Chest Tube (mL): 5 mL    Chest Tube (mL): 60 mL    Drain (mL): 95 mL    Indwelling Catheter - Urethral (mL): 795 mL  Total OUT: 955 mL    Total NET: -305 mL    CHEST TUBE: Yes x 1  GLENIS DRAIN:  Yes x 1  EPICARDIAL WIRES:   TIE DOWNS: None  HOFFMAN: Removed    PHYSICAL EXAM:  GENERAL: NAD, lying in bed comfortably  HEAD:  Atraumatic, Normocephalic  EYES: EOMI, PERRLA, conjunctiva and sclera clear  ENT: Moist mucous membranes  NECK: Supple, No JVD  CHEST/LUNG: CTAB;   HEART: RRR  ABDOMEN: Soft, Nontender, Nondistended. No hepatomegally  EXTREMITIES:  2+ Peripheral Pulses, brisk capillary refill. No clubbing, cyanosis, or edema  NERVOUS SYSTEM:  Alert & Oriented X3, speech clear. No deficits     LABS:                        9.6    9.52  )-----------( 143      ( 28 Sep 2023 12:58 )             28.1     PT/INR - ( 28 Sep 2023 12:58 )   PT: 13.9 sec;   INR: 1.23       PTT - ( 28 Sep 2023 12:58 )  PTT:29.5 sec    09-28    136  |  102  |  18  ----------------------------<  235<H>  4.2   |  23  |  0.75    Ca    8.3<L>      28 Sep 2023 12:58  Phos  4.0     09-28  Mg     1.8     09-28    TPro  5.6<L>  /  Alb  3.9  /  TBili  1.0  /  DBili  x   /  AST  23  /  ALT  28  /  AlkPhos  33<L>  09-28    Urinalysis Basic - ( 28 Sep 2023 12:58 )    Color: x / Appearance: x / SG: x / pH: x  Gluc: 235 mg/dL / Ketone: x  / Bili: x / Urobili: x   Blood: x / Protein: x / Nitrite: x   Leuk Esterase: x / RBC: x / WBC x   Sq Epi: x / Non Sq Epi: x / Bacteria: x    MEDICATIONS  (STANDING):  acetaminophen   IVPB .. 1000 milliGRAM(s) IV Intermittent every 6 hours  albumin human  5% IVPB 250 milliLiter(s) IV Intermittent once  ascorbic acid 500 milliGRAM(s) Oral two times a day  aspirin enteric coated 81 milliGRAM(s) Oral daily  atorvastatin 40 milliGRAM(s) Oral at bedtime  ceFAZolin   IVPB 2000 milliGRAM(s) IV Intermittent every 8 hours  chlorhexidine 2% Cloths 1 Application(s) Topical daily  clopidogrel Tablet 75 milliGRAM(s) Oral daily  dextrose 5%. 1000 milliLiter(s) (100 mL/Hr) IV Continuous <Continuous>  dextrose 5%. 1000 milliLiter(s) (50 mL/Hr) IV Continuous <Continuous>  dextrose 50% Injectable 12.5 Gram(s) IV Push once  dextrose 50% Injectable 25 Gram(s) IV Push once  dextrose 50% Injectable 25 Gram(s) IV Push once  glucagon  Injectable 1 milliGRAM(s) IntraMuscular once  heparin   Injectable 5000 Unit(s) SubCutaneous every 8 hours  insulin lispro (ADMELOG) corrective regimen sliding scale   SubCutaneous Before meals and at bedtime  mupirocin 2% Ointment 1 Application(s) Both Nostrils two times a day  pantoprazole    Tablet 40 milliGRAM(s) Oral daily  polyethylene glycol 3350 17 Gram(s) Oral daily  senna 2 Tablet(s) Oral at bedtime  sodium chloride 0.9% lock flush 3 milliLiter(s) IV Push every 8 hours  sodium chloride 0.9%. 1000 milliLiter(s) (10 mL/Hr) IV Continuous <Continuous>    MEDICATIONS  (PRN):  dextrose Oral Gel 15 Gram(s) Oral once PRN Blood Glucose LESS THAN 70 milliGRAM(s)/deciliter  fentaNYL    Injectable 25 MICROGram(s) IV Push every 3 hours PRN Severe Pain (7 - 10)  oxyCODONE    IR 5 milliGRAM(s) Oral every 4 hours PRN Moderate Pain (4 - 6)  oxyCODONE    IR 10 milliGRAM(s) Oral every 6 hours PRN Severe Pain (7 - 10)    RADIOLOGY & ADDITIONAL TESTS:  < from: Xray Chest 1 View-PORTABLE IMMEDIATE (Xray Chest 1 View-PORTABLE IMMEDIATE .) (09.28.23 @ 13:17) >    FINDINGS:  Lines and Devices: Interval removal of right chest tube, endotracheal   tube, and enteric tube. Left-sided ICD and right IJ central venous line,   unchanged. Left chest tube and mediastinal drains, unchanged.    Mediastinum: Unchanged cardiomegaly.    Lungs: Mild left basilar atelectasis. No definite consolidation. No   pleural effusions. No visible pneumothorax.    Bones/Soft Tissues: No significant change in the surrounding soft tissues   or osseous structures. Surgical clips are noted in the right breast.    IMPRESSION:  No visible pneumothorax.     Patient discussed on morning rounds with Dr. Ramirez    Operation / Date: OPCAB x 2 on 9/27/23    SUBJECTIVE ASSESSMENT: Patient seen and examined at bedside. Patient admits to some pain at the MSI site, otherwise offers no complaints. Hoffman just removed, pending TOV. Denies chills, chest pain, SOB, palpitations, N/V.     Vital Signs Last 24 Hrs  T(C): 35.9 (28 Sep 2023 13:43), Max: 36.3 (27 Sep 2023 21:21)  T(F): 96.7 (28 Sep 2023 13:43), Max: 97.4 (27 Sep 2023 21:21)  HR: 64 (28 Sep 2023 16:55) (60 - 82)  BP: 120/59 (28 Sep 2023 16:55) (120/59 - 120/59)  BP(mean): 85 (28 Sep 2023 16:55) (85 - 85)  RR: 15 (28 Sep 2023 16:55) (10 - 21)  SpO2: 98% (28 Sep 2023 16:55) (95% - 100%)    Parameters below as of 28 Sep 2023 16:55  Patient On (Oxygen Delivery Method): nasal cannula w/ humidification  O2 Flow (L/min): 3    I&O's Detail    27 Sep 2023 07:01  -  28 Sep 2023 07:00  --------------------------------------------------------  IN:    Dexmedetomidine: 12.6 mL    IV PiggyBack: 1250 mL    Norepinephrine: 21.4 mL    sodium chloride 0.9%: 445 mL  Total IN: 1729 mL    OUT:    Chest Tube (mL): 195 mL    Chest Tube (mL): 25 mL    Drain (mL): 350 mL    Indwelling Catheter - Urethral (mL): 590 mL    Oral Fluid: 0 mL  Total OUT: 1160 mL    Total NET: 569 mL    28 Sep 2023 07:01  -  28 Sep 2023 17:05  --------------------------------------------------------  IN:    Oral Fluid: 580 mL    sodium chloride 0.9%: 70 mL  Total IN: 650 mL    OUT:    Chest Tube (mL): 5 mL    Chest Tube (mL): 60 mL    Drain (mL): 95 mL    Indwelling Catheter - Urethral (mL): 795 mL  Total OUT: 955 mL    Total NET: -305 mL    CHEST TUBE: Yes x 1  GLENIS DRAIN:  Yes x 2  EPICARDIAL WIRES: yes  TIE DOWNS: None  HOFFMAN: Removed    PHYSICAL EXAM:  GENERAL: NAD, lying in bed comfortably  HEAD:  Atraumatic, Normocephalic  EYES: EOMI, PERRLA, conjunctiva and sclera clear  ENT: Moist mucous membranes  NECK: Supple, No JVD  CHEST/LUNG: CTAB; Mepilex dressing over MSI intact; Pleural CT x 1 and mediastinal blakes x 2 remain; Epicardial wires in place  HEART: RRR  ABDOMEN: Soft, Nontender, Nondistended. No hepatomegally  EXTREMITIES: LLE graft site well-approximated with Dermabond. 2+ Peripheral Pulses, brisk capillary refill. No clubbing, cyanosis, or edema  NERVOUS SYSTEM:  Alert & Oriented X3, speech clear. No deficits     LABS:                        9.6    9.52  )-----------( 143      ( 28 Sep 2023 12:58 )             28.1     PT/INR - ( 28 Sep 2023 12:58 )   PT: 13.9 sec;   INR: 1.23       PTT - ( 28 Sep 2023 12:58 )  PTT:29.5 sec    09-28    136  |  102  |  18  ----------------------------<  235<H>  4.2   |  23  |  0.75    Ca    8.3<L>      28 Sep 2023 12:58  Phos  4.0     09-28  Mg     1.8     09-28    TPro  5.6<L>  /  Alb  3.9  /  TBili  1.0  /  DBili  x   /  AST  23  /  ALT  28  /  AlkPhos  33<L>  09-28    Urinalysis Basic - ( 28 Sep 2023 12:58 )    Color: x / Appearance: x / SG: x / pH: x  Gluc: 235 mg/dL / Ketone: x  / Bili: x / Urobili: x   Blood: x / Protein: x / Nitrite: x   Leuk Esterase: x / RBC: x / WBC x   Sq Epi: x / Non Sq Epi: x / Bacteria: x    MEDICATIONS  (STANDING):  acetaminophen   IVPB .. 1000 milliGRAM(s) IV Intermittent every 6 hours  albumin human  5% IVPB 250 milliLiter(s) IV Intermittent once  ascorbic acid 500 milliGRAM(s) Oral two times a day  aspirin enteric coated 81 milliGRAM(s) Oral daily  atorvastatin 40 milliGRAM(s) Oral at bedtime  ceFAZolin   IVPB 2000 milliGRAM(s) IV Intermittent every 8 hours  chlorhexidine 2% Cloths 1 Application(s) Topical daily  clopidogrel Tablet 75 milliGRAM(s) Oral daily  dextrose 5%. 1000 milliLiter(s) (100 mL/Hr) IV Continuous <Continuous>  dextrose 5%. 1000 milliLiter(s) (50 mL/Hr) IV Continuous <Continuous>  dextrose 50% Injectable 12.5 Gram(s) IV Push once  dextrose 50% Injectable 25 Gram(s) IV Push once  dextrose 50% Injectable 25 Gram(s) IV Push once  glucagon  Injectable 1 milliGRAM(s) IntraMuscular once  heparin   Injectable 5000 Unit(s) SubCutaneous every 8 hours  insulin lispro (ADMELOG) corrective regimen sliding scale   SubCutaneous Before meals and at bedtime  mupirocin 2% Ointment 1 Application(s) Both Nostrils two times a day  pantoprazole    Tablet 40 milliGRAM(s) Oral daily  polyethylene glycol 3350 17 Gram(s) Oral daily  senna 2 Tablet(s) Oral at bedtime  sodium chloride 0.9% lock flush 3 milliLiter(s) IV Push every 8 hours  sodium chloride 0.9%. 1000 milliLiter(s) (10 mL/Hr) IV Continuous <Continuous>    MEDICATIONS  (PRN):  dextrose Oral Gel 15 Gram(s) Oral once PRN Blood Glucose LESS THAN 70 milliGRAM(s)/deciliter  fentaNYL    Injectable 25 MICROGram(s) IV Push every 3 hours PRN Severe Pain (7 - 10)  oxyCODONE    IR 5 milliGRAM(s) Oral every 4 hours PRN Moderate Pain (4 - 6)  oxyCODONE    IR 10 milliGRAM(s) Oral every 6 hours PRN Severe Pain (7 - 10)    RADIOLOGY & ADDITIONAL TESTS:  < from: Xray Chest 1 View-PORTABLE IMMEDIATE (Xray Chest 1 View-PORTABLE IMMEDIATE .) (09.28.23 @ 13:17) >    FINDINGS:  Lines and Devices: Interval removal of right chest tube, endotracheal   tube, and enteric tube. Left-sided ICD and right IJ central venous line,   unchanged. Left chest tube and mediastinal drains, unchanged.    Mediastinum: Unchanged cardiomegaly.    Lungs: Mild left basilar atelectasis. No definite consolidation. No   pleural effusions. No visible pneumothorax.    Bones/Soft Tissues: No significant change in the surrounding soft tissues   or osseous structures. Surgical clips are noted in the right breast.    IMPRESSION:  No visible pneumothorax.

## 2023-09-28 NOTE — PHYSICAL THERAPY INITIAL EVALUATION ADULT - GAIT DEVIATIONS NOTED, PT EVAL
good endurance, steady with no loss of balance/decreased step length/decreased weight-shifting ability

## 2023-09-28 NOTE — PHYSICAL THERAPY INITIAL EVALUATION ADULT - LIVES WITH, PROFILE
lives in split apartment, daughter in the front apartment and patient lives in the back; no CRIS/children

## 2023-09-28 NOTE — PROGRESS NOTE ADULT - ASSESSMENT
84 YO Female w/ PMHx of HTN, HLD, MI (2005), DMII, CAD (s/p ISAAC x 3 @ [facility] most recently 2008), VT arrest (intubation and subsequent ICD placement in 2019 by Dr. Sesay), Breast cancer (s/p lumpectomy and chemo, in remission) who presented to [facility] on 09/21/23 after her ICD fired x 1 with associated CP and dizziness and elevated trop, found to have NSTEMI s/p Cardiac Cath @  on 09/23/23 revealing LM and double vessel CAD and was subsequently transferred to [facility]. Patient continued on heparin gtt and on 9/25 underwent repeat LHC which showed LM distal calcified 80% stenosis, oLAD 80% calcified stenosis, oCLCx 80% calcified stensis, RCA not engaged due to recent LHC.  CT surgery consulted for evaluation for surgical revascularization and on 9/27/23 underwent OPCAB x 2 (LIMA-LAD, SVG to OM). 1u PRBC given intra-op. Arrived to CTICU intubated on low dose levo. Woke up nonfocal with worsening acidosis ON requiring IVF and Insulin gtt. Pt extubated and was volumized off levo. POD 1 R pleural ct removed. A-line/jalloh removed. Transferred to [facility].       Plan:    Neurovascular:   -Pain well controlled with current regimen. PRN's: Tylenol and Oxycodone    Cardiovascular:   -CAD s/p OPCAB x 2 on 9/27/23; hx of stents  -Cont ASA, plavix, statin  -BB when able  -Hx of VT arrest s/p ICD placed 2019  -Hx of HTN  -Hx of HLD  -Cont statin  -Hemodynamically stable.   -Monitor: BP, HR, tele    Respiratory:   -Oxygenating well on room air  -Encourage continued use of IS 10x/hr and frequent ambulation  -CXR: stable    GI:  -GI PPX: Protonix  -PO Diet  -Bowel Regimen: miralax, senna     Renal / :  -Continue to monitor renal function: BUN/Cr: 18/0.75  -Monitor I/O's daily     Endocrine:    -Hx of DMII  -A1c: 6.8  -ISS  -No hx of thyroid dx  -TSH: 1.98    Hematologic:  -CBC: H/H- 9.6/28.1  -Coagulation Panel.    ID:  -Temperature: Afebrile  -CBC: WBC- 9.52  -Continue to observe for SIRS/Sepsis Syndrome.    Prophylaxis:  -DVT prophylaxis with 5000 SubQ Heparin q8h.  -Continue with SCD's b/l while patient is at rest     Disposition:  -Discharge home once patient is medically ready

## 2023-09-28 NOTE — CHART NOTE - NSCHARTNOTEFT_GEN_A_CORE
tachy therapies turned back on ICD    Ventricular thresholds and impedence consistent with prior tests  No events
As per Dr. Ramirez, right pleural chest tube removed at bedside without incident. U stitch tied down in place and occlusive dressing applied. Patient tolerated procedure well. Follow up CXR stable, no pneumothorax.

## 2023-09-28 NOTE — PHYSICAL THERAPY INITIAL EVALUATION ADULT - ADDITIONAL COMMENTS
As per pt, PTA she was completely independent with all functional mobility, ADLs, and IADLs with no AD. Pt has a walk in shower with a small ledge. Family is available as needed.

## 2023-09-29 ENCOUNTER — TRANSCRIPTION ENCOUNTER (OUTPATIENT)
Age: 83
End: 2023-09-29

## 2023-09-29 LAB
ALBUMIN SERPL ELPH-MCNC: 3.7 G/DL — SIGNIFICANT CHANGE UP (ref 3.3–5)
ALP SERPL-CCNC: 39 U/L — LOW (ref 40–120)
ALT FLD-CCNC: 13 U/L — SIGNIFICANT CHANGE UP (ref 10–45)
ANION GAP SERPL CALC-SCNC: 10 MMOL/L — SIGNIFICANT CHANGE UP (ref 5–17)
ANION GAP SERPL CALC-SCNC: 12 MMOL/L — SIGNIFICANT CHANGE UP (ref 5–17)
AST SERPL-CCNC: 18 U/L — SIGNIFICANT CHANGE UP (ref 10–40)
BASOPHILS # BLD AUTO: 0.04 K/UL — SIGNIFICANT CHANGE UP (ref 0–0.2)
BASOPHILS NFR BLD AUTO: 0.4 % — SIGNIFICANT CHANGE UP (ref 0–2)
BILIRUB SERPL-MCNC: 0.7 MG/DL — SIGNIFICANT CHANGE UP (ref 0.2–1.2)
BUN SERPL-MCNC: 17 MG/DL — SIGNIFICANT CHANGE UP (ref 7–23)
BUN SERPL-MCNC: 21 MG/DL — SIGNIFICANT CHANGE UP (ref 7–23)
CALCIUM SERPL-MCNC: 8.8 MG/DL — SIGNIFICANT CHANGE UP (ref 8.4–10.5)
CALCIUM SERPL-MCNC: 8.8 MG/DL — SIGNIFICANT CHANGE UP (ref 8.4–10.5)
CHLORIDE SERPL-SCNC: 96 MMOL/L — SIGNIFICANT CHANGE UP (ref 96–108)
CHLORIDE SERPL-SCNC: 97 MMOL/L — SIGNIFICANT CHANGE UP (ref 96–108)
CO2 SERPL-SCNC: 22 MMOL/L — SIGNIFICANT CHANGE UP (ref 22–31)
CO2 SERPL-SCNC: 23 MMOL/L — SIGNIFICANT CHANGE UP (ref 22–31)
CREAT SERPL-MCNC: 0.76 MG/DL — SIGNIFICANT CHANGE UP (ref 0.5–1.3)
CREAT SERPL-MCNC: 1 MG/DL — SIGNIFICANT CHANGE UP (ref 0.5–1.3)
EGFR: 56 ML/MIN/1.73M2 — LOW
EGFR: 78 ML/MIN/1.73M2 — SIGNIFICANT CHANGE UP
EOSINOPHIL # BLD AUTO: 0.02 K/UL — SIGNIFICANT CHANGE UP (ref 0–0.5)
EOSINOPHIL NFR BLD AUTO: 0.2 % — SIGNIFICANT CHANGE UP (ref 0–6)
GLUCOSE BLDC GLUCOMTR-MCNC: 161 MG/DL — HIGH (ref 70–99)
GLUCOSE BLDC GLUCOMTR-MCNC: 255 MG/DL — HIGH (ref 70–99)
GLUCOSE BLDC GLUCOMTR-MCNC: 258 MG/DL — HIGH (ref 70–99)
GLUCOSE BLDC GLUCOMTR-MCNC: 322 MG/DL — HIGH (ref 70–99)
GLUCOSE SERPL-MCNC: 194 MG/DL — HIGH (ref 70–99)
GLUCOSE SERPL-MCNC: 267 MG/DL — HIGH (ref 70–99)
HCT VFR BLD CALC: 25.6 % — LOW (ref 34.5–45)
HCT VFR BLD CALC: 28 % — LOW (ref 34.5–45)
HGB BLD-MCNC: 8.8 G/DL — LOW (ref 11.5–15.5)
HGB BLD-MCNC: 9.2 G/DL — LOW (ref 11.5–15.5)
IMM GRANULOCYTES NFR BLD AUTO: 0.7 % — SIGNIFICANT CHANGE UP (ref 0–0.9)
LACTATE SERPL-SCNC: 2.6 MMOL/L — HIGH (ref 0.5–2)
LACTATE SERPL-SCNC: 5 MMOL/L — CRITICAL HIGH (ref 0.5–2)
LYMPHOCYTES # BLD AUTO: 1.03 K/UL — SIGNIFICANT CHANGE UP (ref 1–3.3)
LYMPHOCYTES # BLD AUTO: 9.6 % — LOW (ref 13–44)
MAGNESIUM SERPL-MCNC: 2.2 MG/DL — SIGNIFICANT CHANGE UP (ref 1.6–2.6)
MCHC RBC-ENTMCNC: 31.3 PG — SIGNIFICANT CHANGE UP (ref 27–34)
MCHC RBC-ENTMCNC: 31.9 PG — SIGNIFICANT CHANGE UP (ref 27–34)
MCHC RBC-ENTMCNC: 32.9 GM/DL — SIGNIFICANT CHANGE UP (ref 32–36)
MCHC RBC-ENTMCNC: 34.4 GM/DL — SIGNIFICANT CHANGE UP (ref 32–36)
MCV RBC AUTO: 92.8 FL — SIGNIFICANT CHANGE UP (ref 80–100)
MCV RBC AUTO: 95.2 FL — SIGNIFICANT CHANGE UP (ref 80–100)
MONOCYTES # BLD AUTO: 0.9 K/UL — SIGNIFICANT CHANGE UP (ref 0–0.9)
MONOCYTES NFR BLD AUTO: 8.4 % — SIGNIFICANT CHANGE UP (ref 2–14)
NEUTROPHILS # BLD AUTO: 8.69 K/UL — HIGH (ref 1.8–7.4)
NEUTROPHILS NFR BLD AUTO: 80.7 % — HIGH (ref 43–77)
NRBC # BLD: 0 /100 WBCS — SIGNIFICANT CHANGE UP (ref 0–0)
NRBC # BLD: 0 /100 WBCS — SIGNIFICANT CHANGE UP (ref 0–0)
PLATELET # BLD AUTO: 132 K/UL — LOW (ref 150–400)
PLATELET # BLD AUTO: 144 K/UL — LOW (ref 150–400)
POTASSIUM SERPL-MCNC: 4.6 MMOL/L — SIGNIFICANT CHANGE UP (ref 3.5–5.3)
POTASSIUM SERPL-MCNC: 5.2 MMOL/L — SIGNIFICANT CHANGE UP (ref 3.5–5.3)
POTASSIUM SERPL-SCNC: 4.6 MMOL/L — SIGNIFICANT CHANGE UP (ref 3.5–5.3)
POTASSIUM SERPL-SCNC: 5.2 MMOL/L — SIGNIFICANT CHANGE UP (ref 3.5–5.3)
PROT SERPL-MCNC: 6.1 G/DL — SIGNIFICANT CHANGE UP (ref 6–8.3)
RBC # BLD: 2.76 M/UL — LOW (ref 3.8–5.2)
RBC # BLD: 2.94 M/UL — LOW (ref 3.8–5.2)
RBC # FLD: 16.1 % — HIGH (ref 10.3–14.5)
RBC # FLD: 16.3 % — HIGH (ref 10.3–14.5)
SODIUM SERPL-SCNC: 130 MMOL/L — LOW (ref 135–145)
SODIUM SERPL-SCNC: 130 MMOL/L — LOW (ref 135–145)
WBC # BLD: 10.38 K/UL — SIGNIFICANT CHANGE UP (ref 3.8–10.5)
WBC # BLD: 10.76 K/UL — HIGH (ref 3.8–10.5)
WBC # FLD AUTO: 10.38 K/UL — SIGNIFICANT CHANGE UP (ref 3.8–10.5)
WBC # FLD AUTO: 10.76 K/UL — HIGH (ref 3.8–10.5)

## 2023-09-29 PROCEDURE — 71045 X-RAY EXAM CHEST 1 VIEW: CPT | Mod: 26,77

## 2023-09-29 PROCEDURE — 71045 X-RAY EXAM CHEST 1 VIEW: CPT | Mod: 26

## 2023-09-29 RX ORDER — FUROSEMIDE 40 MG
20 TABLET ORAL DAILY
Refills: 0 | Status: DISCONTINUED | OUTPATIENT
Start: 2023-09-29 | End: 2023-10-02

## 2023-09-29 RX ORDER — ALBUMIN HUMAN 25 %
250 VIAL (ML) INTRAVENOUS ONCE
Refills: 0 | Status: COMPLETED | OUTPATIENT
Start: 2023-09-29 | End: 2023-09-29

## 2023-09-29 RX ORDER — ACETAMINOPHEN 500 MG
650 TABLET ORAL EVERY 6 HOURS
Refills: 0 | Status: DISCONTINUED | OUTPATIENT
Start: 2023-09-29 | End: 2023-10-02

## 2023-09-29 RX ORDER — ALBUMIN HUMAN 25 %
250 VIAL (ML) INTRAVENOUS
Refills: 0 | Status: COMPLETED | OUTPATIENT
Start: 2023-09-29 | End: 2023-09-29

## 2023-09-29 RX ORDER — INSULIN LISPRO 100/ML
5 VIAL (ML) SUBCUTANEOUS
Refills: 0 | Status: DISCONTINUED | OUTPATIENT
Start: 2023-09-29 | End: 2023-10-02

## 2023-09-29 RX ORDER — INSULIN GLARGINE 100 [IU]/ML
17 INJECTION, SOLUTION SUBCUTANEOUS AT BEDTIME
Refills: 0 | Status: DISCONTINUED | OUTPATIENT
Start: 2023-09-29 | End: 2023-10-02

## 2023-09-29 RX ORDER — METOPROLOL TARTRATE 50 MG
12.5 TABLET ORAL
Refills: 0 | Status: DISCONTINUED | OUTPATIENT
Start: 2023-09-29 | End: 2023-09-29

## 2023-09-29 RX ADMIN — Medication 6: at 17:27

## 2023-09-29 RX ADMIN — Medication 500 MILLIGRAM(S): at 19:16

## 2023-09-29 RX ADMIN — POLYETHYLENE GLYCOL 3350 17 GRAM(S): 17 POWDER, FOR SOLUTION ORAL at 11:29

## 2023-09-29 RX ADMIN — Medication 500 MILLIGRAM(S): at 09:06

## 2023-09-29 RX ADMIN — PANTOPRAZOLE SODIUM 40 MILLIGRAM(S): 20 TABLET, DELAYED RELEASE ORAL at 11:30

## 2023-09-29 RX ADMIN — CLOPIDOGREL BISULFATE 75 MILLIGRAM(S): 75 TABLET, FILM COATED ORAL at 11:30

## 2023-09-29 RX ADMIN — OXYCODONE HYDROCHLORIDE 10 MILLIGRAM(S): 5 TABLET ORAL at 10:06

## 2023-09-29 RX ADMIN — Medication 125 MILLILITER(S): at 19:17

## 2023-09-29 RX ADMIN — HEPARIN SODIUM 5000 UNIT(S): 5000 INJECTION INTRAVENOUS; SUBCUTANEOUS at 22:19

## 2023-09-29 RX ADMIN — Medication 81 MILLIGRAM(S): at 11:30

## 2023-09-29 RX ADMIN — OXYCODONE HYDROCHLORIDE 5 MILLIGRAM(S): 5 TABLET ORAL at 06:15

## 2023-09-29 RX ADMIN — Medication 250 MILLILITER(S): at 21:01

## 2023-09-29 RX ADMIN — Medication 100 MILLIGRAM(S): at 07:54

## 2023-09-29 RX ADMIN — MUPIROCIN 1 APPLICATION(S): 20 OINTMENT TOPICAL at 19:15

## 2023-09-29 RX ADMIN — Medication 125 MILLILITER(S): at 17:00

## 2023-09-29 RX ADMIN — SODIUM CHLORIDE 3 MILLILITER(S): 9 INJECTION INTRAMUSCULAR; INTRAVENOUS; SUBCUTANEOUS at 13:36

## 2023-09-29 RX ADMIN — HEPARIN SODIUM 5000 UNIT(S): 5000 INJECTION INTRAVENOUS; SUBCUTANEOUS at 05:18

## 2023-09-29 RX ADMIN — CHLORHEXIDINE GLUCONATE 1 APPLICATION(S): 213 SOLUTION TOPICAL at 11:29

## 2023-09-29 RX ADMIN — Medication 8: at 11:57

## 2023-09-29 RX ADMIN — Medication 2: at 22:19

## 2023-09-29 RX ADMIN — Medication 6: at 07:53

## 2023-09-29 RX ADMIN — Medication 250 MILLILITER(S): at 16:13

## 2023-09-29 RX ADMIN — SODIUM CHLORIDE 3 MILLILITER(S): 9 INJECTION INTRAMUSCULAR; INTRAVENOUS; SUBCUTANEOUS at 05:18

## 2023-09-29 RX ADMIN — OXYCODONE HYDROCHLORIDE 10 MILLIGRAM(S): 5 TABLET ORAL at 09:06

## 2023-09-29 RX ADMIN — Medication 12.5 MILLIGRAM(S): at 09:06

## 2023-09-29 RX ADMIN — Medication 5 UNIT(S): at 17:27

## 2023-09-29 RX ADMIN — INSULIN GLARGINE 17 UNIT(S): 100 INJECTION, SOLUTION SUBCUTANEOUS at 22:23

## 2023-09-29 RX ADMIN — HEPARIN SODIUM 5000 UNIT(S): 5000 INJECTION INTRAVENOUS; SUBCUTANEOUS at 13:44

## 2023-09-29 RX ADMIN — ATORVASTATIN CALCIUM 40 MILLIGRAM(S): 80 TABLET, FILM COATED ORAL at 22:20

## 2023-09-29 RX ADMIN — SENNA PLUS 2 TABLET(S): 8.6 TABLET ORAL at 22:20

## 2023-09-29 RX ADMIN — Medication 20 MILLIGRAM(S): at 13:44

## 2023-09-29 RX ADMIN — MUPIROCIN 1 APPLICATION(S): 20 OINTMENT TOPICAL at 07:51

## 2023-09-29 RX ADMIN — SODIUM CHLORIDE 3 MILLILITER(S): 9 INJECTION INTRAMUSCULAR; INTRAVENOUS; SUBCUTANEOUS at 22:20

## 2023-09-29 NOTE — PROGRESS NOTE ADULT - SUBJECTIVE AND OBJECTIVE BOX
Patient discussed on morning rounds with Dr. Ramirez    OPERATION & DATE: 9/27 OPCAB x 2    SUBJECTIVE ASSESSMENT: Resting comfortably in bed, reports some discomfort from chest tubes    VITAL SIGNS:  Vital Signs Last 24 Hrs  T(C): 36.8 (29 Sep 2023 09:13), Max: 36.8 (29 Sep 2023 09:13)  T(F): 98.3 (29 Sep 2023 09:13), Max: 98.3 (29 Sep 2023 09:13)  HR: 70 (29 Sep 2023 08:42) (61 - 70)  BP: 121/56 (29 Sep 2023 08:42) (107/53 - 139/62)  BP(mean): 81 (29 Sep 2023 08:42) (74 - 89)  RR: 18 (29 Sep 2023 08:42) (15 - 21)  SpO2: 92% (29 Sep 2023 08:42) (92% - 99%)    Parameters below as of 29 Sep 2023 08:42  Patient On (Oxygen Delivery Method): room air      I&O's Detail    28 Sep 2023 07:01  -  29 Sep 2023 07:00  --------------------------------------------------------  IN:    IV PiggyBack: 150 mL    Oral Fluid: 820 mL    sodium chloride 0.9%: 70 mL  Total IN: 1040 mL    OUT:    Chest Tube (mL): 5 mL    Chest Tube (mL): 250 mL    Drain (mL): 120 mL    Indwelling Catheter - Urethral (mL): 795 mL    Voided (mL): 200 mL  Total OUT: 1370 mL    Total NET: -330 mL      29 Sep 2023 07:01  -  29 Sep 2023 12:51  --------------------------------------------------------  IN:  Total IN: 0 mL    OUT:    Chest Tube (mL): 50 mL    Drain (mL): 100 mL    Voided (mL): 200 mL  Total OUT: 350 mL    Total NET: -350 mL        CHEST TUBE:  1 pleural tube removed today  GLENIS DRAIN:  2 mediastinal blakes removed today  EPICARDIAL WIRES: in pleace  STITCHES: 2 tie downs  STAPLES: none  HOFFMAN: none  CENTRAL LINE: RIJ CVC to be removed today  MIDLINE/PICC: none  WOUND VAC: none    PHYSICAL EXAM:  General: resting comfortably in bed in NAD  Neurological: AOx3. Motor skills grossly intact  Cardiovascular: Normal S1/S2. Regular rate/rhythm. No murmurs  Respiratory: Lungs CTA bilaterally. No wheezing or rales  Gastrointestinal: +BS in all 4 quadrants. Non-distended. Soft. Non-tender  Extremities: Strength 5/5 b/l upper/lower extremities. Sensation grossly intact upper/lower extremities. No edema. No calf tenderness.  Vascular: Radial 2+bilaterally, DP 2+ b/l  Incision Sites: MSI covered by meplex, no surrounding erythema      LABS:                        9.2    10.76 )-----------( 132      ( 29 Sep 2023 05:49 )             28.0     PT/INR - ( 28 Sep 2023 12:58 )   PT: 13.9 sec;   INR: 1.23          PTT - ( 28 Sep 2023 12:58 )  PTT:29.5 sec  09-29    130<L>  |  97  |  17  ----------------------------<  194<H>  4.6   |  23  |  0.76    Ca    8.8      29 Sep 2023 05:49  Phos  4.0     09-28  Mg     2.2     09-29    TPro  5.6<L>  /  Alb  3.9  /  TBili  1.0  /  DBili  x   /  AST  23  /  ALT  28  /  AlkPhos  33<L>  09-28    Urinalysis Basic - ( 29 Sep 2023 05:49 )    Color: x / Appearance: x / SG: x / pH: x  Gluc: 194 mg/dL / Ketone: x  / Bili: x / Urobili: x   Blood: x / Protein: x / Nitrite: x   Leuk Esterase: x / RBC: x / WBC x   Sq Epi: x / Non Sq Epi: x / Bacteria: x      MEDICATIONS  (STANDING):  albumin human  5% IVPB 250 milliLiter(s) IV Intermittent once  ascorbic acid 500 milliGRAM(s) Oral two times a day  aspirin enteric coated 81 milliGRAM(s) Oral daily  atorvastatin 40 milliGRAM(s) Oral at bedtime  chlorhexidine 2% Cloths 1 Application(s) Topical daily  clopidogrel Tablet 75 milliGRAM(s) Oral daily  dextrose 5%. 1000 milliLiter(s) (100 mL/Hr) IV Continuous <Continuous>  dextrose 5%. 1000 milliLiter(s) (50 mL/Hr) IV Continuous <Continuous>  dextrose 50% Injectable 25 Gram(s) IV Push once  dextrose 50% Injectable 25 Gram(s) IV Push once  dextrose 50% Injectable 12.5 Gram(s) IV Push once  furosemide    Tablet 20 milliGRAM(s) Oral daily  glucagon  Injectable 1 milliGRAM(s) IntraMuscular once  heparin   Injectable 5000 Unit(s) SubCutaneous every 8 hours  insulin lispro (ADMELOG) corrective regimen sliding scale   SubCutaneous Before meals and at bedtime  metoprolol tartrate 12.5 milliGRAM(s) Oral two times a day  mupirocin 2% Ointment 1 Application(s) Both Nostrils two times a day  pantoprazole    Tablet 40 milliGRAM(s) Oral daily  polyethylene glycol 3350 17 Gram(s) Oral daily  senna 2 Tablet(s) Oral at bedtime  sodium chloride 0.9% lock flush 3 milliLiter(s) IV Push every 8 hours  sodium chloride 0.9%. 1000 milliLiter(s) (10 mL/Hr) IV Continuous <Continuous>    MEDICATIONS  (PRN):  dextrose Oral Gel 15 Gram(s) Oral once PRN Blood Glucose LESS THAN 70 milliGRAM(s)/deciliter  fentaNYL    Injectable 25 MICROGram(s) IV Push every 3 hours PRN Severe Pain (7 - 10)  oxyCODONE    IR 5 milliGRAM(s) Oral every 4 hours PRN Moderate Pain (4 - 6)  oxyCODONE    IR 10 milliGRAM(s) Oral every 6 hours PRN Severe Pain (7 - 10)    RADIOLOGY & ADDITIONAL TESTS:

## 2023-09-29 NOTE — PROGRESS NOTE ADULT - ASSESSMENT
82 YO Female w/ PMHx of HTN, HLD, MI (2005), DMII, CAD (s/p ISAAC x 3 @ [facility] most recently 2008), VT arrest (intubation and subsequent ICD placement in 2019 by Dr. Sesay), Breast cancer (s/p lumpectomy and chemo, in remission) who presented to [facility] on 09/21/23 after her ICD fired x 1 with associated CP and dizziness and elevated trop, found to have NSTEMI s/p Cardiac Cath @  on 09/23/23 revealing LM and double vessel CAD and was subsequently transferred to [facility]. Patient continued on heparin gtt and on 9/25 underwent repeat LHC which showed LM distal calcified 80% stenosis, oLAD 80% calcified stenosis, oCLCx 80% calcified stensis, RCA not engaged due to recent LHC.  CT surgery consulted for evaluation for surgical revascularization and on 9/27/23 underwent OPCAB x 2 (LIMA-LAD, SVG to OM). 1u PRBC given intra-op. Arrived to CTICU intubated on low dose levo. Woke up nonfocal with worsening acidosis ON requiring IVF and Insulin gtt. Pt extubated and was volumized off levo. POD 1 R pleural ct removed. A-line/jalloh removed. Transferred to [facility].  POD 2 tubes removed, pt stable. BB started.    Plan:    Neurovascular:   -Pain well controlled with current regimen. PRN's: oxy, tylenol    Cardiovascular:   -Hemodynamically stable.   -Monitor: BP, HR, tele  -CAD s/p CABG    -c/w ASA/plavix  -Afib ppx.HTN    -metop 12.5 Q12  -HLD    -c/w lipitor    Respiratory:   -Oxygenating well on room air  -Encourage continued use of IS 10x/hr and frequent ambulation  -CXR: WNL    GI:  -GI PPX: protonix   -PO Diet  -Bowel Regimen: miralax, senna    Renal / :  -Continue to monitor renal function: BUN/Cr 17/.76  -Monitor I/O's daily     Endocrine:    -DM  -A1c: 6.8    -ISS, lantus 12, lispro 5  -TSH: 1.9    Hematologic:  -CBC: H/H- 9.2/28  -Coagulation Panel.    ID:  -Temperature: 36.6  -CBC: WBC- 10.7  -Continue to observe for SIRS/Sepsis Syndrome.    Prophylaxis:  -DVT prophylaxis with 5000 SubQ Heparin q8h.  -Continue with SCD's b/l while patient is at rest     Disposition:  -Discharge home once patient is medically ready

## 2023-09-30 LAB
ALBUMIN SERPL ELPH-MCNC: 3.6 G/DL — SIGNIFICANT CHANGE UP (ref 3.3–5)
ALP SERPL-CCNC: 59 U/L — SIGNIFICANT CHANGE UP (ref 40–120)
ALT FLD-CCNC: 12 U/L — SIGNIFICANT CHANGE UP (ref 10–45)
ANION GAP SERPL CALC-SCNC: 9 MMOL/L — SIGNIFICANT CHANGE UP (ref 5–17)
AST SERPL-CCNC: 18 U/L — SIGNIFICANT CHANGE UP (ref 10–40)
BILIRUB SERPL-MCNC: 1.2 MG/DL — SIGNIFICANT CHANGE UP (ref 0.2–1.2)
BUN SERPL-MCNC: 20 MG/DL — SIGNIFICANT CHANGE UP (ref 7–23)
CALCIUM SERPL-MCNC: 8.6 MG/DL — SIGNIFICANT CHANGE UP (ref 8.4–10.5)
CHLORIDE SERPL-SCNC: 98 MMOL/L — SIGNIFICANT CHANGE UP (ref 96–108)
CO2 SERPL-SCNC: 25 MMOL/L — SIGNIFICANT CHANGE UP (ref 22–31)
CREAT SERPL-MCNC: 0.93 MG/DL — SIGNIFICANT CHANGE UP (ref 0.5–1.3)
EGFR: 61 ML/MIN/1.73M2 — SIGNIFICANT CHANGE UP
GLUCOSE BLDC GLUCOMTR-MCNC: 116 MG/DL — HIGH (ref 70–99)
GLUCOSE BLDC GLUCOMTR-MCNC: 168 MG/DL — HIGH (ref 70–99)
GLUCOSE BLDC GLUCOMTR-MCNC: 192 MG/DL — HIGH (ref 70–99)
GLUCOSE BLDC GLUCOMTR-MCNC: 208 MG/DL — HIGH (ref 70–99)
GLUCOSE SERPL-MCNC: 161 MG/DL — HIGH (ref 70–99)
HCT VFR BLD CALC: 24 % — LOW (ref 34.5–45)
HGB BLD-MCNC: 8 G/DL — LOW (ref 11.5–15.5)
LACTATE SERPL-SCNC: 1.1 MMOL/L — SIGNIFICANT CHANGE UP (ref 0.5–2)
MAGNESIUM SERPL-MCNC: 2.2 MG/DL — SIGNIFICANT CHANGE UP (ref 1.6–2.6)
MCHC RBC-ENTMCNC: 31.6 PG — SIGNIFICANT CHANGE UP (ref 27–34)
MCHC RBC-ENTMCNC: 33.3 GM/DL — SIGNIFICANT CHANGE UP (ref 32–36)
MCV RBC AUTO: 94.9 FL — SIGNIFICANT CHANGE UP (ref 80–100)
NRBC # BLD: 0 /100 WBCS — SIGNIFICANT CHANGE UP (ref 0–0)
PLATELET # BLD AUTO: 127 K/UL — LOW (ref 150–400)
POTASSIUM SERPL-MCNC: 4.5 MMOL/L — SIGNIFICANT CHANGE UP (ref 3.5–5.3)
POTASSIUM SERPL-SCNC: 4.5 MMOL/L — SIGNIFICANT CHANGE UP (ref 3.5–5.3)
PROT SERPL-MCNC: 5.9 G/DL — LOW (ref 6–8.3)
RBC # BLD: 2.53 M/UL — LOW (ref 3.8–5.2)
RBC # FLD: 16 % — HIGH (ref 10.3–14.5)
SODIUM SERPL-SCNC: 132 MMOL/L — LOW (ref 135–145)
WBC # BLD: 7.01 K/UL — SIGNIFICANT CHANGE UP (ref 3.8–10.5)
WBC # FLD AUTO: 7.01 K/UL — SIGNIFICANT CHANGE UP (ref 3.8–10.5)

## 2023-09-30 PROCEDURE — 71045 X-RAY EXAM CHEST 1 VIEW: CPT | Mod: 26

## 2023-09-30 RX ADMIN — Medication 4: at 12:19

## 2023-09-30 RX ADMIN — CHLORHEXIDINE GLUCONATE 1 APPLICATION(S): 213 SOLUTION TOPICAL at 13:52

## 2023-09-30 RX ADMIN — Medication 81 MILLIGRAM(S): at 13:31

## 2023-09-30 RX ADMIN — Medication 650 MILLIGRAM(S): at 18:19

## 2023-09-30 RX ADMIN — POLYETHYLENE GLYCOL 3350 17 GRAM(S): 17 POWDER, FOR SOLUTION ORAL at 13:31

## 2023-09-30 RX ADMIN — SODIUM CHLORIDE 3 MILLILITER(S): 9 INJECTION INTRAMUSCULAR; INTRAVENOUS; SUBCUTANEOUS at 21:50

## 2023-09-30 RX ADMIN — Medication 500 MILLIGRAM(S): at 17:10

## 2023-09-30 RX ADMIN — CLOPIDOGREL BISULFATE 75 MILLIGRAM(S): 75 TABLET, FILM COATED ORAL at 13:31

## 2023-09-30 RX ADMIN — MUPIROCIN 1 APPLICATION(S): 20 OINTMENT TOPICAL at 17:04

## 2023-09-30 RX ADMIN — MUPIROCIN 1 APPLICATION(S): 20 OINTMENT TOPICAL at 05:21

## 2023-09-30 RX ADMIN — Medication 500 MILLIGRAM(S): at 06:08

## 2023-09-30 RX ADMIN — SODIUM CHLORIDE 3 MILLILITER(S): 9 INJECTION INTRAMUSCULAR; INTRAVENOUS; SUBCUTANEOUS at 13:52

## 2023-09-30 RX ADMIN — Medication 5 UNIT(S): at 08:05

## 2023-09-30 RX ADMIN — INSULIN GLARGINE 17 UNIT(S): 100 INJECTION, SOLUTION SUBCUTANEOUS at 21:31

## 2023-09-30 RX ADMIN — Medication 5 UNIT(S): at 12:20

## 2023-09-30 RX ADMIN — Medication 2: at 08:03

## 2023-09-30 RX ADMIN — SODIUM CHLORIDE 3 MILLILITER(S): 9 INJECTION INTRAMUSCULAR; INTRAVENOUS; SUBCUTANEOUS at 05:21

## 2023-09-30 RX ADMIN — HEPARIN SODIUM 5000 UNIT(S): 5000 INJECTION INTRAVENOUS; SUBCUTANEOUS at 05:21

## 2023-09-30 RX ADMIN — Medication 2: at 17:03

## 2023-09-30 RX ADMIN — HEPARIN SODIUM 5000 UNIT(S): 5000 INJECTION INTRAVENOUS; SUBCUTANEOUS at 13:31

## 2023-09-30 RX ADMIN — HEPARIN SODIUM 5000 UNIT(S): 5000 INJECTION INTRAVENOUS; SUBCUTANEOUS at 21:31

## 2023-09-30 RX ADMIN — Medication 5 UNIT(S): at 17:04

## 2023-09-30 RX ADMIN — PANTOPRAZOLE SODIUM 40 MILLIGRAM(S): 20 TABLET, DELAYED RELEASE ORAL at 13:31

## 2023-09-30 RX ADMIN — ATORVASTATIN CALCIUM 40 MILLIGRAM(S): 80 TABLET, FILM COATED ORAL at 21:31

## 2023-09-30 RX ADMIN — Medication 20 MILLIGRAM(S): at 05:21

## 2023-09-30 NOTE — PROGRESS NOTE ADULT - SUBJECTIVE AND OBJECTIVE BOX
Patient discussed on morning rounds with Dr. Krishnan    OPERATION & DATE: OPCAB x 4    SUBJECTIVE ASSESSMENT:     VITAL SIGNS:  Vital Signs Last 24 Hrs  T(C): 36.4 (30 Sep 2023 17:04), Max: 37.1 (29 Sep 2023 22:00)  T(F): 97.5 (30 Sep 2023 17:04), Max: 98.8 (29 Sep 2023 22:00)  HR: 84 (30 Sep 2023 17:10) (60 - 84)  BP: 117/55 (30 Sep 2023 17:10) (102/54 - 119/57)  BP(mean): 79 (30 Sep 2023 17:10) (75 - 82)  RR: 12 (30 Sep 2023 17:10) (10 - 16)  SpO2: 100% (30 Sep 2023 17:10) (94% - 100%)    Parameters below as of 30 Sep 2023 17:10  Patient On (Oxygen Delivery Method): nasal cannula  O2 Flow (L/min): 4    I&O's Detail    29 Sep 2023 07:01  -  30 Sep 2023 07:00  --------------------------------------------------------  IN:    Albumin 5%  - 250 mL: 1000 mL  Total IN: 1000 mL    OUT:    Chest Tube (mL): 50 mL    Drain (mL): 100 mL    Voided (mL): 1050 mL  Total OUT: 1200 mL    Total NET: -200 mL      30 Sep 2023 07:01  -  30 Sep 2023 17:15  --------------------------------------------------------  IN:    Oral Fluid: 100 mL  Total IN: 100 mL    OUT:    Voided (mL): 300 mL  Total OUT: 300 mL    Total NET: -200 mL        CHEST TUBE:    GLENIS DRAIN:    EPICARDIAL WIRES:   STITCHES:  STAPLES:  HOFFMAN:   CENTRAL LINE:  MIDLINE/PICC:  WOUND VAC:    PHYSICAL EXAM:  General:  HEENT:  Cardio:  Pulm:  GI:  Extremities:  Vascular:  Incisions:    LABS:                        8.0    7.01  )-----------( 127      ( 30 Sep 2023 06:45 )             24.0       09-30    132<L>  |  98  |  20  ----------------------------<  161<H>  4.5   |  25  |  0.93    Ca    8.6      30 Sep 2023 06:45  Mg     2.2     09-30    TPro  5.9<L>  /  Alb  3.6  /  TBili  1.2  /  DBili  x   /  AST  18  /  ALT  12  /  AlkPhos  59  09-30    Urinalysis Basic - ( 30 Sep 2023 06:45 )    Color: x / Appearance: x / SG: x / pH: x  Gluc: 161 mg/dL / Ketone: x  / Bili: x / Urobili: x   Blood: x / Protein: x / Nitrite: x   Leuk Esterase: x / RBC: x / WBC x   Sq Epi: x / Non Sq Epi: x / Bacteria: x      MEDICATIONS  (STANDING):  albumin human  5% IVPB 250 milliLiter(s) IV Intermittent once  ascorbic acid 500 milliGRAM(s) Oral two times a day  aspirin enteric coated 81 milliGRAM(s) Oral daily  atorvastatin 40 milliGRAM(s) Oral at bedtime  chlorhexidine 2% Cloths 1 Application(s) Topical daily  clopidogrel Tablet 75 milliGRAM(s) Oral daily  dextrose 5%. 1000 milliLiter(s) (50 mL/Hr) IV Continuous <Continuous>  dextrose 5%. 1000 milliLiter(s) (100 mL/Hr) IV Continuous <Continuous>  dextrose 50% Injectable 12.5 Gram(s) IV Push once  dextrose 50% Injectable 25 Gram(s) IV Push once  dextrose 50% Injectable 25 Gram(s) IV Push once  furosemide    Tablet 20 milliGRAM(s) Oral daily  glucagon  Injectable 1 milliGRAM(s) IntraMuscular once  heparin   Injectable 5000 Unit(s) SubCutaneous every 8 hours  insulin glargine Injectable (LANTUS) 17 Unit(s) SubCutaneous at bedtime  insulin lispro (ADMELOG) corrective regimen sliding scale   SubCutaneous Before meals and at bedtime  insulin lispro Injectable (ADMELOG) 5 Unit(s) SubCutaneous three times a day before meals  mupirocin 2% Ointment 1 Application(s) Both Nostrils two times a day  pantoprazole    Tablet 40 milliGRAM(s) Oral daily  polyethylene glycol 3350 17 Gram(s) Oral daily  senna 2 Tablet(s) Oral at bedtime  sodium chloride 0.9% lock flush 3 milliLiter(s) IV Push every 8 hours  sodium chloride 0.9%. 1000 milliLiter(s) (10 mL/Hr) IV Continuous <Continuous>    MEDICATIONS  (PRN):  acetaminophen     Tablet .. 650 milliGRAM(s) Oral every 6 hours PRN Mild Pain (1 - 3)  dextrose Oral Gel 15 Gram(s) Oral once PRN Blood Glucose LESS THAN 70 milliGRAM(s)/deciliter  oxyCODONE    IR 5 milliGRAM(s) Oral every 4 hours PRN Moderate Pain (4 - 6)  oxyCODONE    IR 10 milliGRAM(s) Oral every 6 hours PRN Severe Pain (7 - 10)    RADIOLOGY & ADDITIONAL TESTS:       Patient discussed on morning rounds with Dr. Krishnan    OPERATION & DATE: OPCAB x 4    SUBJECTIVE ASSESSMENT: resting comfortably in bed in NAD    VITAL SIGNS:  Vital Signs Last 24 Hrs  T(C): 36.4 (30 Sep 2023 17:04), Max: 37.1 (29 Sep 2023 22:00)  T(F): 97.5 (30 Sep 2023 17:04), Max: 98.8 (29 Sep 2023 22:00)  HR: 84 (30 Sep 2023 17:10) (60 - 84)  BP: 117/55 (30 Sep 2023 17:10) (102/54 - 119/57)  BP(mean): 79 (30 Sep 2023 17:10) (75 - 82)  RR: 12 (30 Sep 2023 17:10) (10 - 16)  SpO2: 100% (30 Sep 2023 17:10) (94% - 100%)    Parameters below as of 30 Sep 2023 17:10  Patient On (Oxygen Delivery Method): nasal cannula  O2 Flow (L/min): 4    I&O's Detail    29 Sep 2023 07:01  -  30 Sep 2023 07:00  --------------------------------------------------------  IN:    Albumin 5%  - 250 mL: 1000 mL  Total IN: 1000 mL    OUT:    Chest Tube (mL): 50 mL    Drain (mL): 100 mL    Voided (mL): 1050 mL  Total OUT: 1200 mL    Total NET: -200 mL      30 Sep 2023 07:01  -  30 Sep 2023 17:15  --------------------------------------------------------  IN:    Oral Fluid: 100 mL  Total IN: 100 mL    OUT:    Voided (mL): 300 mL  Total OUT: 300 mL    Total NET: -200 mL        CHEST TUBE:  none  GLENIS DRAIN:  none  EPICARDIAL WIRES: in place  STITCHES: none  STAPLES: none  HOFFMAN: none  CENTRAL LINE: none  MIDLINE/PICC: none  WOUND VAC: none    PHYSICAL EXAM:  General: resting comforably in bed in NAD  Neurological: AOx3. Motor skills grossly intact  Cardiovascular: Normal S1/S2. Regular rate/rhythm. No murmurs  Respiratory: Lungs CTA bilaterally. No wheezing or rales  Gastrointestinal: +BS in all 4 quadrants. Non-distended. Soft. Non-tender  Extremities: Strength 5/5 b/l upper/lower extremities. Sensation grossly intact upper/lower extremities. No edema. No calf tenderness.  Vascular: Radial 2+bilaterally, DP 2+ b/l  Incision Sites: MSI clean, dry, intact    LABS:                        8.0    7.01  )-----------( 127      ( 30 Sep 2023 06:45 )             24.0       09-30    132<L>  |  98  |  20  ----------------------------<  161<H>  4.5   |  25  |  0.93    Ca    8.6      30 Sep 2023 06:45  Mg     2.2     09-30    TPro  5.9<L>  /  Alb  3.6  /  TBili  1.2  /  DBili  x   /  AST  18  /  ALT  12  /  AlkPhos  59  09-30    Urinalysis Basic - ( 30 Sep 2023 06:45 )    Color: x / Appearance: x / SG: x / pH: x  Gluc: 161 mg/dL / Ketone: x  / Bili: x / Urobili: x   Blood: x / Protein: x / Nitrite: x   Leuk Esterase: x / RBC: x / WBC x   Sq Epi: x / Non Sq Epi: x / Bacteria: x      MEDICATIONS  (STANDING):  albumin human  5% IVPB 250 milliLiter(s) IV Intermittent once  ascorbic acid 500 milliGRAM(s) Oral two times a day  aspirin enteric coated 81 milliGRAM(s) Oral daily  atorvastatin 40 milliGRAM(s) Oral at bedtime  chlorhexidine 2% Cloths 1 Application(s) Topical daily  clopidogrel Tablet 75 milliGRAM(s) Oral daily  dextrose 5%. 1000 milliLiter(s) (50 mL/Hr) IV Continuous <Continuous>  dextrose 5%. 1000 milliLiter(s) (100 mL/Hr) IV Continuous <Continuous>  dextrose 50% Injectable 12.5 Gram(s) IV Push once  dextrose 50% Injectable 25 Gram(s) IV Push once  dextrose 50% Injectable 25 Gram(s) IV Push once  furosemide    Tablet 20 milliGRAM(s) Oral daily  glucagon  Injectable 1 milliGRAM(s) IntraMuscular once  heparin   Injectable 5000 Unit(s) SubCutaneous every 8 hours  insulin glargine Injectable (LANTUS) 17 Unit(s) SubCutaneous at bedtime  insulin lispro (ADMELOG) corrective regimen sliding scale   SubCutaneous Before meals and at bedtime  insulin lispro Injectable (ADMELOG) 5 Unit(s) SubCutaneous three times a day before meals  mupirocin 2% Ointment 1 Application(s) Both Nostrils two times a day  pantoprazole    Tablet 40 milliGRAM(s) Oral daily  polyethylene glycol 3350 17 Gram(s) Oral daily  senna 2 Tablet(s) Oral at bedtime  sodium chloride 0.9% lock flush 3 milliLiter(s) IV Push every 8 hours  sodium chloride 0.9%. 1000 milliLiter(s) (10 mL/Hr) IV Continuous <Continuous>    MEDICATIONS  (PRN):  acetaminophen     Tablet .. 650 milliGRAM(s) Oral every 6 hours PRN Mild Pain (1 - 3)  dextrose Oral Gel 15 Gram(s) Oral once PRN Blood Glucose LESS THAN 70 milliGRAM(s)/deciliter  oxyCODONE    IR 5 milliGRAM(s) Oral every 4 hours PRN Moderate Pain (4 - 6)  oxyCODONE    IR 10 milliGRAM(s) Oral every 6 hours PRN Severe Pain (7 - 10)    RADIOLOGY & ADDITIONAL TESTS:

## 2023-09-30 NOTE — PROGRESS NOTE ADULT - ASSESSMENT
84 YO Female w/ PMHx of HTN, HLD, MI (2005), DMII, CAD (s/p ISAAC x 3 @ [facility] most recently 2008), VT arrest (intubation and subsequent ICD placement in 2019 by Dr. Sesay), Breast cancer (s/p lumpectomy and chemo, in remission) who presented to [facility] on 09/21/23 after her ICD fired x 1 with associated CP and dizziness and elevated trop, found to have NSTEMI s/p Cardiac Cath @  on 09/23/23 revealing LM and double vessel CAD and was subsequently transferred to [facility]. Patient continued on heparin gtt and on 9/25 underwent repeat LHC which showed LM distal calcified 80% stenosis, oLAD 80% calcified stenosis, oCLCx 80% calcified stensis, RCA not engaged due to recent LHC.  CT surgery consulted for evaluation for surgical revascularization and on 9/27/23 underwent OPCAB x 2 (LIMA-LAD, SVG to OM). 1u PRBC given intra-op. Arrived to CTICU intubated on low dose levo. Woke up nonfocal with worsening acidosis ON requiring IVF and Insulin gtt. Pt extubated and was volumized off levo. POD 1 R pleural ct removed. A-line/jalloh removed. Transferred to [facility].  POD 2 tubes removed, pt stable. BB started. In afternoon, pt became acutely hypoxic and hypotensive after a walk. Started on HFNC and given volume, initial lactate 5 but downtrended after fluid. POD 3 pt feeling well, much improved from yesterday, lactate cleared. BB held given hypotention yesterday    Plan:    Neurovascular:   -Pain well controlled with current regimen. PRN's: oxy, tylenol    Cardiovascular:   -Hemodynamically stable.   -Monitor: BP, HR, tele  -CAD s/p CABG    -c/w ASA/plavix  -HLD    -c/w lipitor  -fluid overload    -c/w lasix 20mg QD    Respiratory:   -Oxygenating well on room air  -Encourage continued use of IS 10x/hr and frequent ambulation  -CXR: WNL    GI:  -GI PPX: protonix   -PO Diet  -Bowel Regimen: miralax, senna    Renal / :  -Continue to monitor renal function: BUN/Cr 20/.93  -Monitor I/O's daily     Endocrine:    -DM  -A1c: 6.8    -ISS, lantus 17, lispro 5  -TSH: 1.9    Hematologic:  -CBC: H/H- 8/24  -Coagulation Panel.    ID:  -Temperature: afebrile  -CBC: WBC- 7  -Continue to observe for SIRS/Sepsis Syndrome.    Prophylaxis:  -DVT prophylaxis with 5000 SubQ Heparin q8h.  -Continue with SCD's b/l while patient is at rest     Disposition:  -Discharge home once patient is medically ready

## 2023-10-01 LAB
ANION GAP SERPL CALC-SCNC: 8 MMOL/L — SIGNIFICANT CHANGE UP (ref 5–17)
BLD GP AB SCN SERPL QL: NEGATIVE — SIGNIFICANT CHANGE UP
BUN SERPL-MCNC: 21 MG/DL — SIGNIFICANT CHANGE UP (ref 7–23)
CALCIUM SERPL-MCNC: 8.6 MG/DL — SIGNIFICANT CHANGE UP (ref 8.4–10.5)
CHLORIDE SERPL-SCNC: 98 MMOL/L — SIGNIFICANT CHANGE UP (ref 96–108)
CO2 SERPL-SCNC: 26 MMOL/L — SIGNIFICANT CHANGE UP (ref 22–31)
CREAT SERPL-MCNC: 0.87 MG/DL — SIGNIFICANT CHANGE UP (ref 0.5–1.3)
EGFR: 66 ML/MIN/1.73M2 — SIGNIFICANT CHANGE UP
GLUCOSE BLDC GLUCOMTR-MCNC: 128 MG/DL — HIGH (ref 70–99)
GLUCOSE BLDC GLUCOMTR-MCNC: 176 MG/DL — HIGH (ref 70–99)
GLUCOSE BLDC GLUCOMTR-MCNC: 197 MG/DL — HIGH (ref 70–99)
GLUCOSE BLDC GLUCOMTR-MCNC: 219 MG/DL — HIGH (ref 70–99)
GLUCOSE SERPL-MCNC: 114 MG/DL — HIGH (ref 70–99)
HCT VFR BLD CALC: 22.4 % — LOW (ref 34.5–45)
HCT VFR BLD CALC: 23.1 % — LOW (ref 34.5–45)
HGB BLD-MCNC: 7.4 G/DL — LOW (ref 11.5–15.5)
HGB BLD-MCNC: 7.6 G/DL — LOW (ref 11.5–15.5)
MAGNESIUM SERPL-MCNC: 2.4 MG/DL — SIGNIFICANT CHANGE UP (ref 1.6–2.6)
MCHC RBC-ENTMCNC: 31.1 PG — SIGNIFICANT CHANGE UP (ref 27–34)
MCHC RBC-ENTMCNC: 31.6 PG — SIGNIFICANT CHANGE UP (ref 27–34)
MCHC RBC-ENTMCNC: 32.9 GM/DL — SIGNIFICANT CHANGE UP (ref 32–36)
MCHC RBC-ENTMCNC: 33 GM/DL — SIGNIFICANT CHANGE UP (ref 32–36)
MCV RBC AUTO: 94.7 FL — SIGNIFICANT CHANGE UP (ref 80–100)
MCV RBC AUTO: 95.7 FL — SIGNIFICANT CHANGE UP (ref 80–100)
NRBC # BLD: 0 /100 WBCS — SIGNIFICANT CHANGE UP (ref 0–0)
NRBC # BLD: 0 /100 WBCS — SIGNIFICANT CHANGE UP (ref 0–0)
PLATELET # BLD AUTO: 150 K/UL — SIGNIFICANT CHANGE UP (ref 150–400)
PLATELET # BLD AUTO: 151 K/UL — SIGNIFICANT CHANGE UP (ref 150–400)
POTASSIUM SERPL-MCNC: 4.3 MMOL/L — SIGNIFICANT CHANGE UP (ref 3.5–5.3)
POTASSIUM SERPL-SCNC: 4.3 MMOL/L — SIGNIFICANT CHANGE UP (ref 3.5–5.3)
RBC # BLD: 2.34 M/UL — LOW (ref 3.8–5.2)
RBC # BLD: 2.44 M/UL — LOW (ref 3.8–5.2)
RBC # FLD: 15.9 % — HIGH (ref 10.3–14.5)
RBC # FLD: 15.9 % — HIGH (ref 10.3–14.5)
RH IG SCN BLD-IMP: POSITIVE — SIGNIFICANT CHANGE UP
SODIUM SERPL-SCNC: 132 MMOL/L — LOW (ref 135–145)
WBC # BLD: 5.32 K/UL — SIGNIFICANT CHANGE UP (ref 3.8–10.5)
WBC # BLD: 6.82 K/UL — SIGNIFICANT CHANGE UP (ref 3.8–10.5)
WBC # FLD AUTO: 5.32 K/UL — SIGNIFICANT CHANGE UP (ref 3.8–10.5)
WBC # FLD AUTO: 6.82 K/UL — SIGNIFICANT CHANGE UP (ref 3.8–10.5)

## 2023-10-01 PROCEDURE — 71045 X-RAY EXAM CHEST 1 VIEW: CPT | Mod: 26

## 2023-10-01 RX ADMIN — CLOPIDOGREL BISULFATE 75 MILLIGRAM(S): 75 TABLET, FILM COATED ORAL at 11:38

## 2023-10-01 RX ADMIN — HEPARIN SODIUM 5000 UNIT(S): 5000 INJECTION INTRAVENOUS; SUBCUTANEOUS at 21:41

## 2023-10-01 RX ADMIN — INSULIN GLARGINE 17 UNIT(S): 100 INJECTION, SOLUTION SUBCUTANEOUS at 21:41

## 2023-10-01 RX ADMIN — Medication 500 MILLIGRAM(S): at 18:07

## 2023-10-01 RX ADMIN — Medication 5 UNIT(S): at 07:28

## 2023-10-01 RX ADMIN — HEPARIN SODIUM 5000 UNIT(S): 5000 INJECTION INTRAVENOUS; SUBCUTANEOUS at 05:41

## 2023-10-01 RX ADMIN — Medication 2: at 21:41

## 2023-10-01 RX ADMIN — SODIUM CHLORIDE 3 MILLILITER(S): 9 INJECTION INTRAMUSCULAR; INTRAVENOUS; SUBCUTANEOUS at 05:37

## 2023-10-01 RX ADMIN — Medication 5 UNIT(S): at 18:06

## 2023-10-01 RX ADMIN — Medication 4: at 11:37

## 2023-10-01 RX ADMIN — MUPIROCIN 1 APPLICATION(S): 20 OINTMENT TOPICAL at 18:07

## 2023-10-01 RX ADMIN — Medication 2: at 17:56

## 2023-10-01 RX ADMIN — HEPARIN SODIUM 5000 UNIT(S): 5000 INJECTION INTRAVENOUS; SUBCUTANEOUS at 14:00

## 2023-10-01 RX ADMIN — SODIUM CHLORIDE 3 MILLILITER(S): 9 INJECTION INTRAMUSCULAR; INTRAVENOUS; SUBCUTANEOUS at 21:08

## 2023-10-01 RX ADMIN — Medication 81 MILLIGRAM(S): at 11:38

## 2023-10-01 RX ADMIN — MUPIROCIN 1 APPLICATION(S): 20 OINTMENT TOPICAL at 05:44

## 2023-10-01 RX ADMIN — Medication 500 MILLIGRAM(S): at 05:41

## 2023-10-01 RX ADMIN — PANTOPRAZOLE SODIUM 40 MILLIGRAM(S): 20 TABLET, DELAYED RELEASE ORAL at 11:38

## 2023-10-01 RX ADMIN — Medication 5 UNIT(S): at 11:37

## 2023-10-01 RX ADMIN — SODIUM CHLORIDE 3 MILLILITER(S): 9 INJECTION INTRAMUSCULAR; INTRAVENOUS; SUBCUTANEOUS at 16:33

## 2023-10-01 RX ADMIN — POLYETHYLENE GLYCOL 3350 17 GRAM(S): 17 POWDER, FOR SOLUTION ORAL at 11:38

## 2023-10-01 RX ADMIN — ATORVASTATIN CALCIUM 40 MILLIGRAM(S): 80 TABLET, FILM COATED ORAL at 21:41

## 2023-10-01 RX ADMIN — Medication 20 MILLIGRAM(S): at 05:41

## 2023-10-01 NOTE — PROGRESS NOTE ADULT - ASSESSMENT
84 YO Female w/ PMHx of HTN, HLD, MI (2005), DMII, CAD (s/p ISAAC x 3 @ [facility] most recently 2008), VT arrest (intubation and subsequent ICD placement in 2019 by Dr. Sesay), Breast cancer (s/p lumpectomy and chemo, in remission) who presented to [facility] on 09/21/23 after her ICD fired x 1 with associated CP and dizziness and elevated trop, found to have NSTEMI s/p Cardiac Cath @  on 09/23/23 revealing LM and double vessel CAD and was subsequently transferred to [facility]. Patient continued on heparin gtt and on 9/25 underwent repeat LHC which showed LM distal calcified 80% stenosis, oLAD 80% calcified stenosis, oCLCx 80% calcified stensis, RCA not engaged due to recent LHC.  CT surgery consulted for evaluation for surgical revascularization and on 9/27/23 underwent OPCAB x 2 (LIMA-LAD, SVG to OM). 1u PRBC given intra-op. Arrived to CTICU intubated on low dose levo. Woke up nonfocal with worsening acidosis ON requiring IVF and Insulin gtt. Pt extubated and was volumized off levo. POD 1 R pleural ct removed. A-line/jalloh removed. Transferred to [facility].  POD 2 tubes removed, pt stable. BB started. In afternoon, pt became acutely hypoxic and hypotensive after a walk. Started on HFNC and given volume, initial lactate 5 but downtrended after fluid. POD 3 pt feeling well, much improved from yesterday, lactate cleared. BB held given hypotention yesterday. POD 4 pt feeling well, hgb 7.6, will give 1 U PRBC.     Plan:    Neurovascular:   -Pain well controlled with current regimen. PRN's: oxy, tylenol    Cardiovascular:   -Hemodynamically stable.   -Monitor: BP, HR, tele  -CAD s/p CABG    -c/w ASA/plavix  -HLD    -c/w lipitor  -fluid overload    -c/w lasix 20mg QD    Respiratory:   -Oxygenating well on room air  -Encourage continued use of IS 10x/hr and frequent ambulation  -CXR: large cardiac sillhouette, pending TTE to r/o effusion    GI:  -GI PPX: protonix   -PO Diet  -Bowel Regimen: miralax, senna    Renal / :  -Continue to monitor renal function: BUN/Cr 21/.87  -Monitor I/O's daily     Endocrine:    -DM  -A1c: 6.8    -ISS, lantus 17, lispro 5  -TSH: 1.9    Hematologic:  -CBC: H/H- 7.6/23  -Coagulation Panel.    ID:  -Temperature: afebrile  -CBC: WBC- 5.3  -Continue to observe for SIRS/Sepsis Syndrome.    Prophylaxis:  -DVT prophylaxis with 5000 SubQ Heparin q8h.  -Continue with SCD's b/l while patient is at rest     Disposition:  -Discharge home once patient is medically ready

## 2023-10-01 NOTE — PROGRESS NOTE ADULT - PROVIDER SPECIALTY LIST ADULT
CT Surgery
Critical Care
Critical Care
CT Surgery
CT Surgery
Cardiology

## 2023-10-01 NOTE — PROGRESS NOTE ADULT - SUBJECTIVE AND OBJECTIVE BOX
Patient discussed on morning rounds with Dr. Krishnan    OPERATION & DATE: OPCAB x 2    SUBJECTIVE ASSESSMENT: resting comfortably in bed in NAD, feelign well today.     VITAL SIGNS:  Vital Signs Last 24 Hrs  T(C): 36.2 (01 Oct 2023 14:12), Max: 36.4 (30 Sep 2023 17:04)  T(F): 97.2 (01 Oct 2023 14:12), Max: 97.5 (30 Sep 2023 17:04)  HR: 68 (01 Oct 2023 12:00) (61 - 84)  BP: 115/57 (01 Oct 2023 12:00) (96/54 - 117/55)  BP(mean): 82 (01 Oct 2023 12:00) (73 - 82)  RR: 17 (01 Oct 2023 12:00) (12 - 17)  SpO2: 96% (01 Oct 2023 12:00) (93% - 100%)    Parameters below as of 01 Oct 2023 12:00  Patient On (Oxygen Delivery Method): room air      I&O's Detail    30 Sep 2023 07:01  -  01 Oct 2023 07:00  --------------------------------------------------------  IN:    Oral Fluid: 100 mL  Total IN: 100 mL    OUT:    Voided (mL): 1750 mL  Total OUT: 1750 mL    Total NET: -1650 mL      01 Oct 2023 07:01  -  01 Oct 2023 14:50  --------------------------------------------------------  IN:    Oral Fluid: 200 mL  Total IN: 200 mL    OUT:  Total OUT: 0 mL    Total NET: 200 mL        CHEST TUBE:  none  GLENIS DRAIN:  none  EPICARDIAL WIRES: in place  STITCHES: none  STAPLES: none  HOFFMAN: none  CENTRAL LINE: none  MIDLINE/PICC: none  WOUND VAC: none    PHYSICAL EXAM:  General: resting comfortably in chair in NAD  Neurological: AOx3. Motor skills grossly intact  Cardiovascular: Normal S1/S2. Regular rate/rhythm. No murmurs  Respiratory: Lungs CTA bilaterally. No wheezing or rales  Gastrointestinal: +BS in all 4 quadrants. Non-distended. Soft. Non-tender  Extremities: Strength 5/5 b/l upper/lower extremities. Sensation grossly intact upper/lower extremities. No edema. No calf tenderness.  Vascular: Radial 2+bilaterally, DP 2+ b/l  Incision Sites: very mild serous drainage from middle portion of MSI, otherwise unremarkable      LABS:                        7.6    5.32  )-----------( 150      ( 01 Oct 2023 07:29 )             23.1       10-01    132<L>  |  98  |  21  ----------------------------<  114<H>  4.3   |  26  |  0.87    Ca    8.6      01 Oct 2023 06:09  Mg     2.4     10-01    TPro  5.9<L>  /  Alb  3.6  /  TBili  1.2  /  DBili  x   /  AST  18  /  ALT  12  /  AlkPhos  59  09-30    Urinalysis Basic - ( 01 Oct 2023 06:09 )    Color: x / Appearance: x / SG: x / pH: x  Gluc: 114 mg/dL / Ketone: x  / Bili: x / Urobili: x   Blood: x / Protein: x / Nitrite: x   Leuk Esterase: x / RBC: x / WBC x   Sq Epi: x / Non Sq Epi: x / Bacteria: x      MEDICATIONS  (STANDING):  albumin human  5% IVPB 250 milliLiter(s) IV Intermittent once  ascorbic acid 500 milliGRAM(s) Oral two times a day  aspirin enteric coated 81 milliGRAM(s) Oral daily  atorvastatin 40 milliGRAM(s) Oral at bedtime  chlorhexidine 2% Cloths 1 Application(s) Topical daily  clopidogrel Tablet 75 milliGRAM(s) Oral daily  dextrose 5%. 1000 milliLiter(s) (50 mL/Hr) IV Continuous <Continuous>  dextrose 5%. 1000 milliLiter(s) (100 mL/Hr) IV Continuous <Continuous>  dextrose 50% Injectable 12.5 Gram(s) IV Push once  dextrose 50% Injectable 25 Gram(s) IV Push once  dextrose 50% Injectable 25 Gram(s) IV Push once  furosemide    Tablet 20 milliGRAM(s) Oral daily  glucagon  Injectable 1 milliGRAM(s) IntraMuscular once  heparin   Injectable 5000 Unit(s) SubCutaneous every 8 hours  insulin glargine Injectable (LANTUS) 17 Unit(s) SubCutaneous at bedtime  insulin lispro (ADMELOG) corrective regimen sliding scale   SubCutaneous Before meals and at bedtime  insulin lispro Injectable (ADMELOG) 5 Unit(s) SubCutaneous three times a day before meals  mupirocin 2% Ointment 1 Application(s) Both Nostrils two times a day  pantoprazole    Tablet 40 milliGRAM(s) Oral daily  polyethylene glycol 3350 17 Gram(s) Oral daily  senna 2 Tablet(s) Oral at bedtime  sodium chloride 0.9% lock flush 3 milliLiter(s) IV Push every 8 hours  sodium chloride 0.9%. 1000 milliLiter(s) (10 mL/Hr) IV Continuous <Continuous>    MEDICATIONS  (PRN):  acetaminophen     Tablet .. 650 milliGRAM(s) Oral every 6 hours PRN Mild Pain (1 - 3)  dextrose Oral Gel 15 Gram(s) Oral once PRN Blood Glucose LESS THAN 70 milliGRAM(s)/deciliter  oxyCODONE    IR 5 milliGRAM(s) Oral every 4 hours PRN Moderate Pain (4 - 6)  oxyCODONE    IR 10 milliGRAM(s) Oral every 6 hours PRN Severe Pain (7 - 10)    RADIOLOGY & ADDITIONAL TESTS:

## 2023-10-02 VITALS — WEIGHT: 157.41 LBS | BODY MASS INDEX: 25.3 KG/M2 | HEIGHT: 65.98 IN

## 2023-10-02 VITALS
OXYGEN SATURATION: 98 % | SYSTOLIC BLOOD PRESSURE: 125 MMHG | DIASTOLIC BLOOD PRESSURE: 70 MMHG | HEART RATE: 74 BPM | RESPIRATION RATE: 18 BRPM

## 2023-10-02 LAB
ANION GAP SERPL CALC-SCNC: 10 MMOL/L — SIGNIFICANT CHANGE UP (ref 5–17)
BUN SERPL-MCNC: 14 MG/DL — SIGNIFICANT CHANGE UP (ref 7–23)
CALCIUM SERPL-MCNC: 8.9 MG/DL — SIGNIFICANT CHANGE UP (ref 8.4–10.5)
CHLORIDE SERPL-SCNC: 101 MMOL/L — SIGNIFICANT CHANGE UP (ref 96–108)
CO2 SERPL-SCNC: 26 MMOL/L — SIGNIFICANT CHANGE UP (ref 22–31)
CREAT SERPL-MCNC: 0.7 MG/DL — SIGNIFICANT CHANGE UP (ref 0.5–1.3)
EGFR: 86 ML/MIN/1.73M2 — SIGNIFICANT CHANGE UP
GLUCOSE BLDC GLUCOMTR-MCNC: 152 MG/DL — HIGH (ref 70–99)
GLUCOSE BLDC GLUCOMTR-MCNC: 207 MG/DL — HIGH (ref 70–99)
GLUCOSE SERPL-MCNC: 102 MG/DL — HIGH (ref 70–99)
HCT VFR BLD CALC: 26.5 % — LOW (ref 34.5–45)
HGB BLD-MCNC: 8.8 G/DL — LOW (ref 11.5–15.5)
ISTAT ACTK (ACTIVATED CLOTTING TIME KAOLIN): 275 SEC — HIGH (ref 74–137)
MAGNESIUM SERPL-MCNC: 2.2 MG/DL — SIGNIFICANT CHANGE UP (ref 1.6–2.6)
MCHC RBC-ENTMCNC: 31.2 PG — SIGNIFICANT CHANGE UP (ref 27–34)
MCHC RBC-ENTMCNC: 33.2 GM/DL — SIGNIFICANT CHANGE UP (ref 32–36)
MCV RBC AUTO: 94 FL — SIGNIFICANT CHANGE UP (ref 80–100)
NRBC # BLD: 0 /100 WBCS — SIGNIFICANT CHANGE UP (ref 0–0)
PLATELET # BLD AUTO: 198 K/UL — SIGNIFICANT CHANGE UP (ref 150–400)
POTASSIUM SERPL-MCNC: 4.6 MMOL/L — SIGNIFICANT CHANGE UP (ref 3.5–5.3)
POTASSIUM SERPL-SCNC: 4.6 MMOL/L — SIGNIFICANT CHANGE UP (ref 3.5–5.3)
RBC # BLD: 2.82 M/UL — LOW (ref 3.8–5.2)
RBC # FLD: 15.9 % — HIGH (ref 10.3–14.5)
SODIUM SERPL-SCNC: 137 MMOL/L — SIGNIFICANT CHANGE UP (ref 135–145)
WBC # BLD: 5.46 K/UL — SIGNIFICANT CHANGE UP (ref 3.8–10.5)
WBC # FLD AUTO: 5.46 K/UL — SIGNIFICANT CHANGE UP (ref 3.8–10.5)

## 2023-10-02 PROCEDURE — 85027 COMPLETE CBC AUTOMATED: CPT

## 2023-10-02 PROCEDURE — 83605 ASSAY OF LACTIC ACID: CPT

## 2023-10-02 PROCEDURE — 82947 ASSAY GLUCOSE BLOOD QUANT: CPT

## 2023-10-02 PROCEDURE — 82330 ASSAY OF CALCIUM: CPT

## 2023-10-02 PROCEDURE — 71045 X-RAY EXAM CHEST 1 VIEW: CPT | Mod: 26

## 2023-10-02 PROCEDURE — 83735 ASSAY OF MAGNESIUM: CPT

## 2023-10-02 PROCEDURE — 81001 URINALYSIS AUTO W/SCOPE: CPT

## 2023-10-02 PROCEDURE — 85610 PROTHROMBIN TIME: CPT

## 2023-10-02 PROCEDURE — 93880 EXTRACRANIAL BILAT STUDY: CPT

## 2023-10-02 PROCEDURE — 85347 COAGULATION TIME ACTIVATED: CPT

## 2023-10-02 PROCEDURE — 86850 RBC ANTIBODY SCREEN: CPT

## 2023-10-02 PROCEDURE — 85014 HEMATOCRIT: CPT

## 2023-10-02 PROCEDURE — 86891 AUTOLOGOUS BLOOD OP SALVAGE: CPT

## 2023-10-02 PROCEDURE — P9016: CPT

## 2023-10-02 PROCEDURE — C1887: CPT

## 2023-10-02 PROCEDURE — 86900 BLOOD TYPING SEROLOGIC ABO: CPT

## 2023-10-02 PROCEDURE — 84100 ASSAY OF PHOSPHORUS: CPT

## 2023-10-02 PROCEDURE — 84443 ASSAY THYROID STIM HORMONE: CPT

## 2023-10-02 PROCEDURE — C1751: CPT

## 2023-10-02 PROCEDURE — 83036 HEMOGLOBIN GLYCOSYLATED A1C: CPT

## 2023-10-02 PROCEDURE — 93308 TTE F-UP OR LMTD: CPT | Mod: 26

## 2023-10-02 PROCEDURE — 84484 ASSAY OF TROPONIN QUANT: CPT

## 2023-10-02 PROCEDURE — 97161 PT EVAL LOW COMPLEX 20 MIN: CPT

## 2023-10-02 PROCEDURE — 85025 COMPLETE CBC W/AUTO DIFF WBC: CPT

## 2023-10-02 PROCEDURE — 71045 X-RAY EXAM CHEST 1 VIEW: CPT

## 2023-10-02 PROCEDURE — 80048 BASIC METABOLIC PNL TOTAL CA: CPT

## 2023-10-02 PROCEDURE — 86901 BLOOD TYPING SEROLOGIC RH(D): CPT

## 2023-10-02 PROCEDURE — P9045: CPT

## 2023-10-02 PROCEDURE — C8929: CPT

## 2023-10-02 PROCEDURE — 83880 ASSAY OF NATRIURETIC PEPTIDE: CPT

## 2023-10-02 PROCEDURE — 93308 TTE F-UP OR LMTD: CPT

## 2023-10-02 PROCEDURE — 85730 THROMBOPLASTIN TIME PARTIAL: CPT

## 2023-10-02 PROCEDURE — 80053 COMPREHEN METABOLIC PANEL: CPT

## 2023-10-02 PROCEDURE — 82962 GLUCOSE BLOOD TEST: CPT

## 2023-10-02 PROCEDURE — 84295 ASSAY OF SERUM SODIUM: CPT

## 2023-10-02 PROCEDURE — 94640 AIRWAY INHALATION TREATMENT: CPT

## 2023-10-02 PROCEDURE — 71046 X-RAY EXAM CHEST 2 VIEWS: CPT

## 2023-10-02 PROCEDURE — 94002 VENT MGMT INPAT INIT DAY: CPT

## 2023-10-02 PROCEDURE — 36430 TRANSFUSION BLD/BLD COMPNT: CPT

## 2023-10-02 PROCEDURE — 36415 COLL VENOUS BLD VENIPUNCTURE: CPT

## 2023-10-02 PROCEDURE — 93005 ELECTROCARDIOGRAM TRACING: CPT

## 2023-10-02 PROCEDURE — C1753: CPT

## 2023-10-02 PROCEDURE — C1769: CPT

## 2023-10-02 PROCEDURE — 82803 BLOOD GASES ANY COMBINATION: CPT

## 2023-10-02 PROCEDURE — 80061 LIPID PANEL: CPT

## 2023-10-02 PROCEDURE — C1889: CPT

## 2023-10-02 PROCEDURE — 97116 GAIT TRAINING THERAPY: CPT

## 2023-10-02 PROCEDURE — 94150 VITAL CAPACITY TEST: CPT

## 2023-10-02 PROCEDURE — 85576 BLOOD PLATELET AGGREGATION: CPT

## 2023-10-02 PROCEDURE — C1894: CPT

## 2023-10-02 PROCEDURE — 86923 COMPATIBILITY TEST ELECTRIC: CPT

## 2023-10-02 PROCEDURE — 84132 ASSAY OF SERUM POTASSIUM: CPT

## 2023-10-02 RX ORDER — ASPIRIN/CALCIUM CARB/MAGNESIUM 324 MG
1 TABLET ORAL
Qty: 0 | Refills: 0 | DISCHARGE

## 2023-10-02 RX ORDER — CLOPIDOGREL BISULFATE 75 MG/1
1 TABLET, FILM COATED ORAL
Qty: 30 | Refills: 0
Start: 2023-10-02 | End: 2023-10-31

## 2023-10-02 RX ORDER — OXYCODONE HYDROCHLORIDE 5 MG/1
1 TABLET ORAL
Qty: 28 | Refills: 0
Start: 2023-10-02 | End: 2023-10-08

## 2023-10-02 RX ORDER — FUROSEMIDE 40 MG
1 TABLET ORAL
Qty: 7 | Refills: 0
Start: 2023-10-02 | End: 2023-10-08

## 2023-10-02 RX ORDER — ACETAMINOPHEN 500 MG
2 TABLET ORAL
Qty: 56 | Refills: 0
Start: 2023-10-02 | End: 2023-10-08

## 2023-10-02 RX ORDER — METOPROLOL TARTRATE 50 MG
0.5 TABLET ORAL
Qty: 30 | Refills: 0
Start: 2023-10-02 | End: 2023-10-31

## 2023-10-02 RX ORDER — ATORVASTATIN CALCIUM 80 MG/1
1 TABLET, FILM COATED ORAL
Qty: 0 | Refills: 0 | DISCHARGE

## 2023-10-02 RX ORDER — ASPIRIN/CALCIUM CARB/MAGNESIUM 324 MG
1 TABLET ORAL
Qty: 30 | Refills: 0
Start: 2023-10-02 | End: 2023-10-31

## 2023-10-02 RX ORDER — FLUTICASONE PROPIONATE 50 MCG
1 SPRAY, SUSPENSION NASAL
Qty: 0 | Refills: 0 | DISCHARGE

## 2023-10-02 RX ORDER — PIOGLITAZONE HYDROCHLORIDE 15 MG/1
1 TABLET ORAL
Qty: 30 | Refills: 0
Start: 2023-10-02 | End: 2023-10-31

## 2023-10-02 RX ORDER — PIOGLITAZONE HYDROCHLORIDE 15 MG/1
1 TABLET ORAL
Qty: 0 | Refills: 0 | DISCHARGE

## 2023-10-02 RX ORDER — METOPROLOL TARTRATE 50 MG
12.5 TABLET ORAL
Refills: 0 | Status: DISCONTINUED | OUTPATIENT
Start: 2023-10-02 | End: 2023-10-02

## 2023-10-02 RX ORDER — ATORVASTATIN CALCIUM 80 MG/1
1 TABLET, FILM COATED ORAL
Qty: 30 | Refills: 0
Start: 2023-10-02 | End: 2023-10-31

## 2023-10-02 RX ORDER — METOPROLOL TARTRATE 50 MG
1 TABLET ORAL
Qty: 0 | Refills: 0 | DISCHARGE

## 2023-10-02 RX ADMIN — Medication 12.5 MILLIGRAM(S): at 09:47

## 2023-10-02 RX ADMIN — Medication 81 MILLIGRAM(S): at 11:12

## 2023-10-02 RX ADMIN — CLOPIDOGREL BISULFATE 75 MILLIGRAM(S): 75 TABLET, FILM COATED ORAL at 11:11

## 2023-10-02 RX ADMIN — HEPARIN SODIUM 5000 UNIT(S): 5000 INJECTION INTRAVENOUS; SUBCUTANEOUS at 05:17

## 2023-10-02 RX ADMIN — Medication 5 UNIT(S): at 07:19

## 2023-10-02 RX ADMIN — MUPIROCIN 1 APPLICATION(S): 20 OINTMENT TOPICAL at 05:25

## 2023-10-02 RX ADMIN — Medication 2: at 07:19

## 2023-10-02 RX ADMIN — CHLORHEXIDINE GLUCONATE 1 APPLICATION(S): 213 SOLUTION TOPICAL at 11:01

## 2023-10-02 RX ADMIN — Medication 500 MILLIGRAM(S): at 05:17

## 2023-10-02 RX ADMIN — Medication 20 MILLIGRAM(S): at 05:17

## 2023-10-02 RX ADMIN — Medication 650 MILLIGRAM(S): at 06:00

## 2023-10-02 RX ADMIN — Medication 5 UNIT(S): at 11:17

## 2023-10-02 RX ADMIN — PANTOPRAZOLE SODIUM 40 MILLIGRAM(S): 20 TABLET, DELAYED RELEASE ORAL at 11:11

## 2023-10-02 RX ADMIN — Medication 650 MILLIGRAM(S): at 05:24

## 2023-10-02 RX ADMIN — SODIUM CHLORIDE 3 MILLILITER(S): 9 INJECTION INTRAMUSCULAR; INTRAVENOUS; SUBCUTANEOUS at 14:04

## 2023-10-02 RX ADMIN — Medication 4: at 11:16

## 2023-10-02 RX ADMIN — SODIUM CHLORIDE 3 MILLILITER(S): 9 INJECTION INTRAMUSCULAR; INTRAVENOUS; SUBCUTANEOUS at 05:12

## 2023-10-03 ENCOUNTER — APPOINTMENT (OUTPATIENT)
Dept: CARE COORDINATION | Facility: HOME HEALTH | Age: 83
End: 2023-10-03
Payer: MEDICARE

## 2023-10-03 VITALS — BODY MASS INDEX: 26.08 KG/M2 | RESPIRATION RATE: 18 BRPM | WEIGHT: 161.5 LBS

## 2023-10-03 PROCEDURE — 99024 POSTOP FOLLOW-UP VISIT: CPT

## 2023-10-03 RX ORDER — ACETAMINOPHEN 325 MG/1
325 TABLET ORAL
Qty: 360 | Refills: 0 | Status: ACTIVE | COMMUNITY
Start: 2023-10-03 | End: 1900-01-01

## 2023-10-03 RX ORDER — AMIODARONE HYDROCHLORIDE 100 MG/1
100 TABLET ORAL
Qty: 90 | Refills: 0 | Status: DISCONTINUED | COMMUNITY
Start: 2021-07-08 | End: 2023-10-03

## 2023-10-03 RX ORDER — ATORVASTATIN CALCIUM 40 MG/1
40 TABLET, FILM COATED ORAL
Qty: 90 | Refills: 3 | Status: ACTIVE | COMMUNITY

## 2023-10-03 RX ORDER — METOPROLOL TARTRATE 25 MG/1
25 TABLET, FILM COATED ORAL
Qty: 30 | Refills: 0 | Status: ACTIVE | COMMUNITY
Start: 2023-10-03 | End: 1900-01-01

## 2023-10-03 RX ORDER — GLIPIZIDE AND METFORMIN HYDROCHLORIDE 2.5; 5 MG/1; MG/1
2.5-5 TABLET, FILM COATED ORAL
Qty: 180 | Refills: 0 | Status: DISCONTINUED | COMMUNITY
Start: 2021-07-08 | End: 2023-10-03

## 2023-10-03 RX ORDER — GLIPIZIDE AND METFORMIN HYDROCHLORIDE 5; 500 MG/1; MG/1
5-500 TABLET, FILM COATED ORAL
Refills: 0 | Status: DISCONTINUED | COMMUNITY
End: 2023-10-03

## 2023-10-03 RX ORDER — PIOGLITAZONE HYDROCHLORIDE 30 MG/1
30 TABLET ORAL DAILY
Qty: 30 | Refills: 0 | Status: ACTIVE | COMMUNITY
Start: 2023-10-03 | End: 1900-01-01

## 2023-10-03 RX ORDER — AMIODARONE HYDROCHLORIDE 200 MG/1
200 TABLET ORAL DAILY
Qty: 45 | Refills: 0 | Status: DISCONTINUED | COMMUNITY
Start: 2019-07-08 | End: 2023-10-03

## 2023-10-03 RX ORDER — PIOGLITAZONE HYDROCHLORIDE 30 MG/1
30 TABLET ORAL
Qty: 30 | Refills: 0 | Status: DISCONTINUED | COMMUNITY
Start: 2019-04-02 | End: 2023-10-03

## 2023-10-03 RX ORDER — HYALURONATE SOD, CROSS-LINKED 30 MG/3 ML
30 SYRINGE (ML) INTRAARTICULAR
Qty: 2 | Refills: 0 | Status: DISCONTINUED | COMMUNITY
Start: 2019-09-23 | End: 2023-10-03

## 2023-10-03 RX ORDER — GABAPENTIN 100 MG/1
100 CAPSULE ORAL EVERY 8 HOURS
Qty: 90 | Refills: 0 | Status: ACTIVE | COMMUNITY
Start: 2023-10-03 | End: 1900-01-01

## 2023-10-03 RX ORDER — ASPIRIN 81 MG
81 TABLET, DELAYED RELEASE (ENTERIC COATED) ORAL
Qty: 90 | Refills: 3 | Status: ACTIVE | COMMUNITY

## 2023-10-03 RX ORDER — METOPROLOL SUCCINATE 50 MG/1
50 TABLET, EXTENDED RELEASE ORAL
Qty: 30 | Refills: 0 | Status: DISCONTINUED | COMMUNITY
Start: 2019-04-02 | End: 2023-10-03

## 2023-10-03 RX ORDER — FLUTICASONE FUROATE 100 UG/1
100 POWDER RESPIRATORY (INHALATION)
Qty: 30 | Refills: 0 | Status: DISCONTINUED | COMMUNITY
Start: 2021-07-08 | End: 2023-10-03

## 2023-10-03 RX ORDER — HYALURONATE SOD, CROSS-LINKED 30 MG/3 ML
30 SYRINGE (ML) INTRAARTICULAR
Qty: 2 | Refills: 0 | Status: DISCONTINUED | COMMUNITY
Start: 2021-03-30 | End: 2023-10-03

## 2023-10-03 RX ORDER — CLOPIDOGREL BISULFATE 75 MG/1
75 TABLET, FILM COATED ORAL DAILY
Qty: 30 | Refills: 0 | Status: ACTIVE | COMMUNITY
Start: 2023-10-03 | End: 1900-01-01

## 2023-10-06 ENCOUNTER — APPOINTMENT (OUTPATIENT)
Dept: CARE COORDINATION | Facility: HOME HEALTH | Age: 83
End: 2023-10-06
Payer: MEDICARE

## 2023-10-06 DIAGNOSIS — I25.84 CORONARY ATHEROSCLEROSIS DUE TO CALCIFIED CORONARY LESION: ICD-10-CM

## 2023-10-06 DIAGNOSIS — Z85.3 PERSONAL HISTORY OF MALIGNANT NEOPLASM OF BREAST: ICD-10-CM

## 2023-10-06 DIAGNOSIS — I45.10 UNSPECIFIED RIGHT BUNDLE-BRANCH BLOCK: ICD-10-CM

## 2023-10-06 DIAGNOSIS — I50.23 ACUTE ON CHRONIC SYSTOLIC (CONGESTIVE) HEART FAILURE: ICD-10-CM

## 2023-10-06 DIAGNOSIS — E87.20 ACIDOSIS, UNSPECIFIED: ICD-10-CM

## 2023-10-06 DIAGNOSIS — I21.4 NON-ST ELEVATION (NSTEMI) MYOCARDIAL INFARCTION: ICD-10-CM

## 2023-10-06 DIAGNOSIS — Z79.82 LONG TERM (CURRENT) USE OF ASPIRIN: ICD-10-CM

## 2023-10-06 DIAGNOSIS — Z95.810 PRESENCE OF AUTOMATIC (IMPLANTABLE) CARDIAC DEFIBRILLATOR: ICD-10-CM

## 2023-10-06 DIAGNOSIS — Z95.5 PRESENCE OF CORONARY ANGIOPLASTY IMPLANT AND GRAFT: ICD-10-CM

## 2023-10-06 DIAGNOSIS — N39.0 URINARY TRACT INFECTION, SITE NOT SPECIFIED: ICD-10-CM

## 2023-10-06 DIAGNOSIS — Z86.74 PERSONAL HISTORY OF SUDDEN CARDIAC ARREST: ICD-10-CM

## 2023-10-06 DIAGNOSIS — D62 ACUTE POSTHEMORRHAGIC ANEMIA: ICD-10-CM

## 2023-10-06 DIAGNOSIS — E78.00 PURE HYPERCHOLESTEROLEMIA, UNSPECIFIED: ICD-10-CM

## 2023-10-06 DIAGNOSIS — Z90.710 ACQUIRED ABSENCE OF BOTH CERVIX AND UTERUS: ICD-10-CM

## 2023-10-06 DIAGNOSIS — I42.9 CARDIOMYOPATHY, UNSPECIFIED: ICD-10-CM

## 2023-10-06 DIAGNOSIS — I31.39 OTHER PERICARDIAL EFFUSION (NONINFLAMMATORY): ICD-10-CM

## 2023-10-06 DIAGNOSIS — I11.0 HYPERTENSIVE HEART DISEASE WITH HEART FAILURE: ICD-10-CM

## 2023-10-06 DIAGNOSIS — R09.02 HYPOXEMIA: ICD-10-CM

## 2023-10-06 DIAGNOSIS — I95.9 HYPOTENSION, UNSPECIFIED: ICD-10-CM

## 2023-10-06 DIAGNOSIS — E11.65 TYPE 2 DIABETES MELLITUS WITH HYPERGLYCEMIA: ICD-10-CM

## 2023-10-06 DIAGNOSIS — Z92.21 PERSONAL HISTORY OF ANTINEOPLASTIC CHEMOTHERAPY: ICD-10-CM

## 2023-10-06 DIAGNOSIS — I25.2 OLD MYOCARDIAL INFARCTION: ICD-10-CM

## 2023-10-06 DIAGNOSIS — I25.10 ATHEROSCLEROTIC HEART DISEASE OF NATIVE CORONARY ARTERY WITHOUT ANGINA PECTORIS: ICD-10-CM

## 2023-10-06 PROCEDURE — 99024 POSTOP FOLLOW-UP VISIT: CPT

## 2023-10-09 VITALS
HEART RATE: 70 BPM | RESPIRATION RATE: 18 BRPM | WEIGHT: 159 LBS | OXYGEN SATURATION: 96 % | SYSTOLIC BLOOD PRESSURE: 128 MMHG | BODY MASS INDEX: 25.68 KG/M2 | DIASTOLIC BLOOD PRESSURE: 78 MMHG

## 2023-10-11 ENCOUNTER — APPOINTMENT (OUTPATIENT)
Dept: CARDIOTHORACIC SURGERY | Facility: CLINIC | Age: 83
End: 2023-10-11

## 2023-10-11 ENCOUNTER — NON-APPOINTMENT (OUTPATIENT)
Age: 83
End: 2023-10-11

## 2023-10-11 ENCOUNTER — APPOINTMENT (OUTPATIENT)
Dept: CARDIOTHORACIC SURGERY | Facility: CLINIC | Age: 83
End: 2023-10-11
Payer: MEDICARE

## 2023-10-11 ENCOUNTER — OUTPATIENT (OUTPATIENT)
Dept: OUTPATIENT SERVICES | Facility: HOSPITAL | Age: 83
LOS: 1 days | End: 2023-10-11
Payer: MEDICARE

## 2023-10-11 VITALS
DIASTOLIC BLOOD PRESSURE: 77 MMHG | BODY MASS INDEX: 25.23 KG/M2 | OXYGEN SATURATION: 98 % | TEMPERATURE: 98 F | HEART RATE: 67 BPM | HEIGHT: 66 IN | SYSTOLIC BLOOD PRESSURE: 140 MMHG | RESPIRATION RATE: 17 BRPM | WEIGHT: 157 LBS

## 2023-10-11 DIAGNOSIS — J90 PLEURAL EFFUSION, NOT ELSEWHERE CLASSIFIED: ICD-10-CM

## 2023-10-11 PROCEDURE — 99024 POSTOP FOLLOW-UP VISIT: CPT

## 2023-10-11 PROCEDURE — 71046 X-RAY EXAM CHEST 2 VIEWS: CPT

## 2023-10-11 PROCEDURE — 71046 X-RAY EXAM CHEST 2 VIEWS: CPT | Mod: 26

## 2023-10-11 RX ORDER — OXYCODONE 5 MG/1
5 TABLET ORAL
Qty: 10 | Refills: 0 | Status: DISCONTINUED | COMMUNITY
Start: 2023-10-03 | End: 2023-10-11

## 2023-10-24 ENCOUNTER — OUTPATIENT (OUTPATIENT)
Dept: OUTPATIENT SERVICES | Facility: HOSPITAL | Age: 83
LOS: 1 days | End: 2023-10-24
Payer: MEDICARE

## 2023-10-24 ENCOUNTER — APPOINTMENT (OUTPATIENT)
Dept: CARDIOTHORACIC SURGERY | Facility: CLINIC | Age: 83
End: 2023-10-24
Payer: MEDICARE

## 2023-10-24 VITALS
SYSTOLIC BLOOD PRESSURE: 131 MMHG | TEMPERATURE: 96.6 F | HEART RATE: 60 BPM | HEIGHT: 66 IN | BODY MASS INDEX: 24.11 KG/M2 | OXYGEN SATURATION: 100 % | WEIGHT: 150 LBS | RESPIRATION RATE: 17 BRPM | DIASTOLIC BLOOD PRESSURE: 69 MMHG

## 2023-10-24 DIAGNOSIS — Z09 ENCOUNTER FOR FOLLOW-UP EXAMINATION AFTER COMPLETED TREATMENT FOR CONDITIONS OTHER THAN MALIGNANT NEOPLASM: ICD-10-CM

## 2023-10-24 DIAGNOSIS — Z95.1 PRESENCE OF AORTOCORONARY BYPASS GRAFT: ICD-10-CM

## 2023-10-24 PROCEDURE — 71046 X-RAY EXAM CHEST 2 VIEWS: CPT

## 2023-10-24 PROCEDURE — 71046 X-RAY EXAM CHEST 2 VIEWS: CPT | Mod: 26

## 2023-10-24 PROCEDURE — 99024 POSTOP FOLLOW-UP VISIT: CPT

## 2023-10-25 PROBLEM — Z09 POSTOP CHECK: Status: ACTIVE | Noted: 2023-10-02

## 2023-10-25 PROBLEM — Z95.1 S/P CABG X 2: Status: ACTIVE | Noted: 2023-10-02

## 2023-10-25 RX ORDER — FUROSEMIDE 40 MG/1
40 TABLET ORAL DAILY
Qty: 14 | Refills: 0 | Status: COMPLETED | COMMUNITY
Start: 2023-10-03 | End: 2023-10-25

## 2023-10-25 RX ORDER — POTASSIUM CHLORIDE 1500 MG/1
20 TABLET, FILM COATED, EXTENDED RELEASE ORAL
Qty: 14 | Refills: 0 | Status: COMPLETED | COMMUNITY
End: 2023-10-25

## 2023-10-31 ENCOUNTER — APPOINTMENT (OUTPATIENT)
Dept: HEART AND VASCULAR | Facility: CLINIC | Age: 83
End: 2023-10-31
Payer: MEDICARE

## 2023-10-31 ENCOUNTER — NON-APPOINTMENT (OUTPATIENT)
Age: 83
End: 2023-10-31

## 2023-10-31 PROCEDURE — 93295 DEV INTERROG REMOTE 1/2/MLT: CPT

## 2023-10-31 PROCEDURE — 93296 REM INTERROG EVL PM/IDS: CPT

## 2023-11-01 ENCOUNTER — TRANSCRIPTION ENCOUNTER (OUTPATIENT)
Age: 83
End: 2023-11-01

## 2023-11-13 ENCOUNTER — APPOINTMENT (OUTPATIENT)
Dept: HEART AND VASCULAR | Facility: CLINIC | Age: 83
End: 2023-11-13
Payer: MEDICARE

## 2023-11-13 ENCOUNTER — NON-APPOINTMENT (OUTPATIENT)
Age: 83
End: 2023-11-13

## 2023-11-13 VITALS
DIASTOLIC BLOOD PRESSURE: 77 MMHG | SYSTOLIC BLOOD PRESSURE: 121 MMHG | WEIGHT: 150 LBS | HEIGHT: 66 IN | BODY MASS INDEX: 24.11 KG/M2 | HEART RATE: 75 BPM

## 2023-11-13 PROCEDURE — 99214 OFFICE O/P EST MOD 30 MIN: CPT | Mod: 25

## 2023-11-13 PROCEDURE — 93282 PRGRMG EVAL IMPLANTABLE DFB: CPT

## 2023-11-21 NOTE — ED ADULT TRIAGE NOTE - NS ED NURSE DIRECT TO ROOM YN
No Denies any travel in the last 2 weeks - denies any recent exposure to anyone with known or suspected covid - denies any current covid symptoms

## 2023-12-07 RX ORDER — AMIODARONE HYDROCHLORIDE 200 MG/1
200 TABLET ORAL DAILY
Qty: 90 | Refills: 0 | Status: ACTIVE | COMMUNITY
Start: 2023-11-13 | End: 1900-01-01

## 2023-12-07 RX ORDER — AMIODARONE HYDROCHLORIDE 200 MG/1
200 TABLET ORAL
Qty: 30 | Refills: 0 | Status: COMPLETED | COMMUNITY
Start: 2023-11-16 | End: 2023-12-07

## 2023-12-18 ENCOUNTER — APPOINTMENT (OUTPATIENT)
Dept: HEART AND VASCULAR | Facility: CLINIC | Age: 83
End: 2023-12-18
Payer: MEDICARE

## 2023-12-18 ENCOUNTER — NON-APPOINTMENT (OUTPATIENT)
Age: 83
End: 2023-12-18

## 2023-12-18 PROCEDURE — 93297 REM INTERROG DEV EVAL ICPMS: CPT

## 2023-12-18 PROCEDURE — G2066: CPT

## 2024-01-22 ENCOUNTER — APPOINTMENT (OUTPATIENT)
Dept: HEART AND VASCULAR | Facility: CLINIC | Age: 84
End: 2024-01-22
Payer: MEDICARE

## 2024-01-22 ENCOUNTER — NON-APPOINTMENT (OUTPATIENT)
Age: 84
End: 2024-01-22

## 2024-01-22 PROCEDURE — 93297 REM INTERROG DEV EVAL ICPMS: CPT

## 2024-02-25 ENCOUNTER — NON-APPOINTMENT (OUTPATIENT)
Age: 84
End: 2024-02-25

## 2024-02-26 ENCOUNTER — APPOINTMENT (OUTPATIENT)
Dept: HEART AND VASCULAR | Facility: CLINIC | Age: 84
End: 2024-02-26
Payer: MEDICARE

## 2024-02-26 PROCEDURE — 93296 REM INTERROG EVL PM/IDS: CPT

## 2024-02-26 PROCEDURE — 93295 DEV INTERROG REMOTE 1/2/MLT: CPT

## 2024-03-18 NOTE — H&P ADULT - NS NEC GEN PE MLT EXAM PC
[de-identified] : Patient is an 84-year-old woman who presents for follow-up for iron deficiency anemia. Has PMHx hyperparathyroidism, nephrolithiasis, hyperlipidemia, GERD, and prediabetes Patient was recently hospitalized at [facility] from 1/19/24 to 1/23/24 after visit with PCP showing Hgb 6.1. On arrival to hospital, Hgb 5.7. Patient received at least 3U pRBCs. During hospital stay she had an upper endoscopy which revealed multiple angiodysplastic lesions with stigmata of recent bleeding that were found in the cardia and in the gastric fundus. Patient underwent ablation which was successful in stopping the bleeding. Patient had outpatient video capsule endoscopy and repeat colonoscopy with her GI, showing polyps and evidence of previous bleeding.  Patient was given IV Feraheme Infusion and had reaction. Has since been on oral iron BID with Metamucil due to constipation.   Interval History: Endorses compliance with oral iron BID. Feels some fatigue. Very concerned for upcoming GI appointment to discuss biopsy results of adenoma found. Hgb noted to be downtrending from 10.7 --> 10.2 --> 9.7. Denies GI//mucosal bleeding. Stools are always dark so unclear if she has tarry stools.     No bruits; no thyromegaly or nodules

## 2024-03-29 ENCOUNTER — NON-APPOINTMENT (OUTPATIENT)
Age: 84
End: 2024-03-29

## 2024-03-29 ENCOUNTER — APPOINTMENT (OUTPATIENT)
Dept: HEART AND VASCULAR | Facility: CLINIC | Age: 84
End: 2024-03-29
Payer: MEDICARE

## 2024-03-29 PROCEDURE — 93297 REM INTERROG DEV EVAL ICPMS: CPT

## 2024-05-03 ENCOUNTER — APPOINTMENT (OUTPATIENT)
Dept: HEART AND VASCULAR | Facility: CLINIC | Age: 84
End: 2024-05-03
Payer: MEDICARE

## 2024-05-03 ENCOUNTER — NON-APPOINTMENT (OUTPATIENT)
Age: 84
End: 2024-05-03

## 2024-05-03 PROCEDURE — 93297 REM INTERROG DEV EVAL ICPMS: CPT

## 2024-05-13 ENCOUNTER — APPOINTMENT (OUTPATIENT)
Dept: CARE COORDINATION | Facility: HOME HEALTH | Age: 84
End: 2024-05-13

## 2024-06-10 ENCOUNTER — NON-APPOINTMENT (OUTPATIENT)
Age: 84
End: 2024-06-10

## 2024-06-10 ENCOUNTER — APPOINTMENT (OUTPATIENT)
Dept: HEART AND VASCULAR | Facility: CLINIC | Age: 84
End: 2024-06-10
Payer: MEDICARE

## 2024-06-10 PROCEDURE — 93296 REM INTERROG EVL PM/IDS: CPT

## 2024-06-10 PROCEDURE — 93295 DEV INTERROG REMOTE 1/2/MLT: CPT

## 2024-07-16 ENCOUNTER — NON-APPOINTMENT (OUTPATIENT)
Age: 84
End: 2024-07-16

## 2024-07-16 ENCOUNTER — APPOINTMENT (OUTPATIENT)
Dept: HEART AND VASCULAR | Facility: CLINIC | Age: 84
End: 2024-07-16
Payer: MEDICARE

## 2024-07-16 PROCEDURE — 93297 REM INTERROG DEV EVAL ICPMS: CPT

## 2024-08-20 ENCOUNTER — NON-APPOINTMENT (OUTPATIENT)
Age: 84
End: 2024-08-20

## 2024-08-20 ENCOUNTER — APPOINTMENT (OUTPATIENT)
Dept: HEART AND VASCULAR | Facility: CLINIC | Age: 84
End: 2024-08-20
Payer: MEDICARE

## 2024-08-20 PROCEDURE — 93297 REM INTERROG DEV EVAL ICPMS: CPT

## 2024-08-26 ENCOUNTER — APPOINTMENT (OUTPATIENT)
Dept: HEART AND VASCULAR | Facility: CLINIC | Age: 84
End: 2024-08-26
Payer: MEDICARE

## 2024-08-26 ENCOUNTER — NON-APPOINTMENT (OUTPATIENT)
Age: 84
End: 2024-08-26

## 2024-08-26 VITALS
OXYGEN SATURATION: 96 % | DIASTOLIC BLOOD PRESSURE: 67 MMHG | HEIGHT: 66 IN | BODY MASS INDEX: 24.11 KG/M2 | WEIGHT: 150 LBS | TEMPERATURE: 96.2 F | HEART RATE: 63 BPM | SYSTOLIC BLOOD PRESSURE: 123 MMHG

## 2024-08-26 PROCEDURE — 99213 OFFICE O/P EST LOW 20 MIN: CPT | Mod: 25

## 2024-08-26 PROCEDURE — 93282 PRGRMG EVAL IMPLANTABLE DFB: CPT

## 2024-08-26 RX ORDER — METOPROLOL SUCCINATE 50 MG/1
50 TABLET, EXTENDED RELEASE ORAL
Refills: 0 | Status: ACTIVE | COMMUNITY

## 2024-08-26 RX ORDER — SEMAGLUTIDE 0.68 MG/ML
INJECTION, SOLUTION SUBCUTANEOUS
Refills: 0 | Status: ACTIVE | COMMUNITY

## 2024-08-26 RX ORDER — SACUBITRIL AND VALSARTAN 24; 26 MG/1; MG/1
24-26 TABLET, FILM COATED ORAL
Refills: 0 | Status: ACTIVE | COMMUNITY

## 2024-08-26 RX ORDER — GLIPIZIDE AND METFORMIN HYDROCHLORIDE 2.5; 5 MG/1; MG/1
2.5-5 TABLET, FILM COATED ORAL
Refills: 0 | Status: ACTIVE | COMMUNITY

## 2024-08-27 NOTE — PHYSICAL EXAM
[Normal Appearance] : normal appearance [General Appearance - Well Developed] : well developed [Well Groomed] : well groomed [General Appearance - Well Nourished] : well nourished [No Deformities] : no deformities [General Appearance - In No Acute Distress] : no acute distress [Heart Rate And Rhythm] : heart rate and rhythm were normal [Heart Sounds] : normal S1 and S2 [] : no respiratory distress [Respiration, Rhythm And Depth] : normal respiratory rhythm and effort [Exaggerated Use Of Accessory Muscles For Inspiration] : no accessory muscle use [Left Infraclavicular] : left infraclavicular area [Clean] : clean [Dry] : dry [Well-Healed] : well-healed [Palpable Crepitus] : no palpable crepitus [Bleeding] : no active bleeding [Foul Odor] : no foul smell [Purulent Drainage] : no purulent drainage [Serosanguineous Drainage] : no serosanquineous drainage [Serous Drainage] : no serous drainage

## 2024-08-27 NOTE — DISCUSSION/SUMMARY
[FreeTextEntry1] : Ms. Contreras is a pleasant 84 year-old female with a past medical history significant for HLD, HTN, NIDDM-II, R breast CA s/p lumpectomy and chemo (in remission), CAD s/p PCI x 3 (last 2008 @ [facility]) S/P CABG 9/2023 and symptomatic sustained VT s/p ICD placement 5/1/19 for secondary prevention of SCD  1.  VT Multiple episodes of sustained VT @ 160-165 bpm treated with ATP and 41J shock on 8/20/24 Discussed options for management including continuation / bolus of Amiodarone vs. catheter based ablation of VT.  She declines VT ablation after a thorough discussion regarding R/B/A Continue Amiodarone 400 mg x 1 week then resume 200 mg daily  2.  ICD The device is functioning appropriately as programmed and all measured data is WNL.  The patient is enrolled in remote monitoring and was asked to return for follow-up in three months.  Advised to call with any questions or concerns in the interim.

## 2024-08-27 NOTE — REVIEW OF SYSTEMS
[Fever] : no fever [Chills] : no chills [Feeling Fatigued] : not feeling fatigued [SOB] : no shortness of breath [Dyspnea on exertion] : not dyspnea during exertion [Palpitations] : no palpitations [Syncope] : no syncope [Negative] : Heme/Lymph

## 2024-08-27 NOTE — ADDENDUM
[FreeTextEntry1] : I, Suni Marin, am scribing for and the presence of Dr. Sesay the following sections: HPI, PMH,Family/social history, ROS, Physical Exam, Assessment / Plan.   I, Minh Sesay, personally performed the services described in the documentation, reviewed the documentation recorded by the scribe in my presence and it accurately and completely records my words and actions.

## 2024-08-27 NOTE — HISTORY OF PRESENT ILLNESS
[de-identified] : Ms. Contreras is a pleasant 84 year-old female with a past medical history significant for HLD, HTN, NIDDM-II, R breast CA s/p lumpectomy and chemo (in remission), CAD s/p PCI x 3 (last 2008 @ [facility]) and symptomatic sustained VT s/p ICD placement 5/1/19 for secondary prevention of SCD.  She had sustained VT s/p ICD shock 5/5/19.  She deferred ablation and was started on Amiodarone.  Recurrent events s/p ICD shock and subsequently she underwent CABG 9/2023 with Dr. Ramirez.  Recurrent VT treated with ATP 11/2023.    Presents for f/u today after receiving ICD therapy for VT 8/20/24; admitted to [facility].  Reports she was symptomatic with dizziness and general malaise that day.  She reports she had a cardiac cath with no intervention.  Amiodarone was increased to 400 mg daily.

## 2024-08-27 NOTE — PROCEDURE
[No] : not [NSR] : normal sinus rhythm [ICD] : Implantable cardioverter-defibrillator [VVI] : VVI [de-identified] : [facility] [de-identified] : RESONATE [de-identified] : 5/1/19 [de-identified] : 40 [de-identified] : see paceart transmission from 11/13/23

## 2024-09-25 ENCOUNTER — APPOINTMENT (OUTPATIENT)
Dept: HEART AND VASCULAR | Facility: CLINIC | Age: 84
End: 2024-09-25
Payer: MEDICARE

## 2024-09-25 ENCOUNTER — NON-APPOINTMENT (OUTPATIENT)
Age: 84
End: 2024-09-25

## 2024-09-25 PROCEDURE — 93297 REM INTERROG DEV EVAL ICPMS: CPT

## 2024-09-26 ENCOUNTER — APPOINTMENT (OUTPATIENT)
Dept: CT IMAGING | Facility: HOSPITAL | Age: 84
End: 2024-09-26

## 2024-10-04 ENCOUNTER — APPOINTMENT (OUTPATIENT)
Dept: OPHTHALMOLOGY | Facility: CLINIC | Age: 84
End: 2024-10-04
Payer: MEDICARE

## 2024-10-04 ENCOUNTER — NON-APPOINTMENT (OUTPATIENT)
Age: 84
End: 2024-10-04

## 2024-10-04 PROCEDURE — 92004 COMPRE OPH EXAM NEW PT 1/>: CPT

## 2024-10-30 ENCOUNTER — APPOINTMENT (OUTPATIENT)
Dept: HEART AND VASCULAR | Facility: CLINIC | Age: 84
End: 2024-10-30

## 2024-10-30 ENCOUNTER — NON-APPOINTMENT (OUTPATIENT)
Age: 84
End: 2024-10-30

## 2024-10-30 PROCEDURE — 93297 REM INTERROG DEV EVAL ICPMS: CPT

## 2024-11-19 NOTE — PROGRESS NOTE ADULT - PROBLEM SELECTOR PLAN 3
Detail Level: Simple Render In Strict Bullet Format?: No Continue Regimen: Metronidazole 0.75% lotion BID Lipid panel wnl  - Continue atorva 40mg qhs Continue Regimen: Frequent bland emollients and creams - Apply to skin QD-BID\\nBetamethasone Dipropinate 0.05% cream - apply bid x 10-14 days

## 2024-12-30 ENCOUNTER — APPOINTMENT (OUTPATIENT)
Dept: HEART AND VASCULAR | Facility: CLINIC | Age: 84
End: 2024-12-30

## 2025-01-06 ENCOUNTER — NON-APPOINTMENT (OUTPATIENT)
Age: 85
End: 2025-01-06

## 2025-01-06 ENCOUNTER — APPOINTMENT (OUTPATIENT)
Dept: HEART AND VASCULAR | Facility: CLINIC | Age: 85
End: 2025-01-06
Payer: MEDICARE

## 2025-01-06 VITALS
WEIGHT: 150 LBS | HEART RATE: 59 BPM | DIASTOLIC BLOOD PRESSURE: 62 MMHG | BODY MASS INDEX: 24.11 KG/M2 | TEMPERATURE: 97 F | HEIGHT: 66 IN | SYSTOLIC BLOOD PRESSURE: 124 MMHG

## 2025-01-06 PROCEDURE — 93282 PRGRMG EVAL IMPLANTABLE DFB: CPT

## 2025-01-06 PROCEDURE — 99213 OFFICE O/P EST LOW 20 MIN: CPT | Mod: 25

## 2025-01-06 RX ORDER — MEXILETINE HCL 200 MG
CAPSULE ORAL
Refills: 0 | Status: ACTIVE | COMMUNITY

## 2025-02-10 ENCOUNTER — APPOINTMENT (OUTPATIENT)
Dept: HEART AND VASCULAR | Facility: CLINIC | Age: 85
End: 2025-02-10

## 2025-02-11 ENCOUNTER — APPOINTMENT (OUTPATIENT)
Dept: HEART AND VASCULAR | Facility: CLINIC | Age: 85
End: 2025-02-11

## 2025-02-11 PROCEDURE — 93297 REM INTERROG DEV EVAL ICPMS: CPT

## 2025-02-18 ENCOUNTER — INPATIENT (INPATIENT)
Facility: HOSPITAL | Age: 85
LOS: 0 days | Discharge: HOME | End: 2025-02-19
Attending: INTERNAL MEDICINE | Admitting: INTERNAL MEDICINE
Payer: MEDICARE

## 2025-02-18 VITALS
HEART RATE: 61 BPM | HEIGHT: 66 IN | OXYGEN SATURATION: 96 % | DIASTOLIC BLOOD PRESSURE: 72 MMHG | TEMPERATURE: 97 F | RESPIRATION RATE: 18 BRPM | WEIGHT: 145.06 LBS | SYSTOLIC BLOOD PRESSURE: 125 MMHG

## 2025-02-18 DIAGNOSIS — Z95.1 PRESENCE OF AORTOCORONARY BYPASS GRAFT: ICD-10-CM

## 2025-02-18 DIAGNOSIS — Z95.810 PRESENCE OF AUTOMATIC (IMPLANTABLE) CARDIAC DEFIBRILLATOR: ICD-10-CM

## 2025-02-18 DIAGNOSIS — Z95.5 PRESENCE OF CORONARY ANGIOPLASTY IMPLANT AND GRAFT: ICD-10-CM

## 2025-02-18 DIAGNOSIS — I47.20 VENTRICULAR TACHYCARDIA, UNSPECIFIED: ICD-10-CM

## 2025-02-18 DIAGNOSIS — I10 ESSENTIAL (PRIMARY) HYPERTENSION: ICD-10-CM

## 2025-02-18 DIAGNOSIS — Z92.21 PERSONAL HISTORY OF ANTINEOPLASTIC CHEMOTHERAPY: ICD-10-CM

## 2025-02-18 DIAGNOSIS — Z79.82 LONG TERM (CURRENT) USE OF ASPIRIN: ICD-10-CM

## 2025-02-18 DIAGNOSIS — E11.9 TYPE 2 DIABETES MELLITUS WITHOUT COMPLICATIONS: ICD-10-CM

## 2025-02-18 DIAGNOSIS — Z79.84 LONG TERM (CURRENT) USE OF ORAL HYPOGLYCEMIC DRUGS: ICD-10-CM

## 2025-02-18 DIAGNOSIS — E78.00 PURE HYPERCHOLESTEROLEMIA, UNSPECIFIED: ICD-10-CM

## 2025-02-18 DIAGNOSIS — I49.3 VENTRICULAR PREMATURE DEPOLARIZATION: ICD-10-CM

## 2025-02-18 DIAGNOSIS — I25.10 ATHEROSCLEROTIC HEART DISEASE OF NATIVE CORONARY ARTERY WITHOUT ANGINA PECTORIS: ICD-10-CM

## 2025-02-18 DIAGNOSIS — Z79.02 LONG TERM (CURRENT) USE OF ANTITHROMBOTICS/ANTIPLATELETS: ICD-10-CM

## 2025-02-18 DIAGNOSIS — Z79.85 LONG-TERM (CURRENT) USE OF INJECTABLE NON-INSULIN ANTIDIABETIC DRUGS: ICD-10-CM

## 2025-02-18 DIAGNOSIS — Z86.79 PERSONAL HISTORY OF OTHER DISEASES OF THE CIRCULATORY SYSTEM: ICD-10-CM

## 2025-02-18 DIAGNOSIS — Z85.3 PERSONAL HISTORY OF MALIGNANT NEOPLASM OF BREAST: ICD-10-CM

## 2025-02-18 LAB
ANION GAP SERPL CALC-SCNC: 14 MMOL/L — SIGNIFICANT CHANGE UP (ref 5–17)
APTT BLD: 29.6 SEC — SIGNIFICANT CHANGE UP (ref 24.5–35.6)
BLD GP AB SCN SERPL QL: NEGATIVE — SIGNIFICANT CHANGE UP
BUN SERPL-MCNC: 20 MG/DL — SIGNIFICANT CHANGE UP (ref 7–23)
CALCIUM SERPL-MCNC: 9.9 MG/DL — SIGNIFICANT CHANGE UP (ref 8.4–10.5)
CHLORIDE SERPL-SCNC: 97 MMOL/L — SIGNIFICANT CHANGE UP (ref 96–108)
CO2 SERPL-SCNC: 25 MMOL/L — SIGNIFICANT CHANGE UP (ref 22–31)
CREAT SERPL-MCNC: 0.86 MG/DL — SIGNIFICANT CHANGE UP (ref 0.5–1.3)
EGFR: 67 ML/MIN/1.73M2 — SIGNIFICANT CHANGE UP
GLUCOSE SERPL-MCNC: 192 MG/DL — HIGH (ref 70–99)
HCT VFR BLD CALC: 44 % — SIGNIFICANT CHANGE UP (ref 34.5–45)
HGB BLD-MCNC: 14.3 G/DL — SIGNIFICANT CHANGE UP (ref 11.5–15.5)
INR BLD: 1.04 — SIGNIFICANT CHANGE UP (ref 0.85–1.16)
MCHC RBC-ENTMCNC: 31.4 PG — SIGNIFICANT CHANGE UP (ref 27–34)
MCHC RBC-ENTMCNC: 32.5 G/DL — SIGNIFICANT CHANGE UP (ref 32–36)
MCV RBC AUTO: 96.5 FL — SIGNIFICANT CHANGE UP (ref 80–100)
NRBC BLD AUTO-RTO: 0 /100 WBCS — SIGNIFICANT CHANGE UP (ref 0–0)
PLATELET # BLD AUTO: 228 K/UL — SIGNIFICANT CHANGE UP (ref 150–400)
POTASSIUM SERPL-MCNC: 4.3 MMOL/L — SIGNIFICANT CHANGE UP (ref 3.5–5.3)
POTASSIUM SERPL-SCNC: 4.3 MMOL/L — SIGNIFICANT CHANGE UP (ref 3.5–5.3)
PROTHROM AB SERPL-ACNC: 12 SEC — SIGNIFICANT CHANGE UP (ref 9.9–13.4)
RBC # BLD: 4.56 M/UL — SIGNIFICANT CHANGE UP (ref 3.8–5.2)
RBC # FLD: 14.4 % — SIGNIFICANT CHANGE UP (ref 10.3–14.5)
RH IG SCN BLD-IMP: POSITIVE — SIGNIFICANT CHANGE UP
RH IG SCN BLD-IMP: POSITIVE — SIGNIFICANT CHANGE UP
SODIUM SERPL-SCNC: 136 MMOL/L — SIGNIFICANT CHANGE UP (ref 135–145)
WBC # BLD: 5.51 K/UL — SIGNIFICANT CHANGE UP (ref 3.8–10.5)
WBC # FLD AUTO: 5.51 K/UL — SIGNIFICANT CHANGE UP (ref 3.8–10.5)

## 2025-02-18 PROCEDURE — 93654 COMPRE EP EVAL TX VT: CPT

## 2025-02-18 PROCEDURE — 93662 INTRACARDIAC ECG (ICE): CPT | Mod: 26

## 2025-02-18 PROCEDURE — 93462 L HRT CATH TRNSPTL PUNCTURE: CPT

## 2025-02-18 RX ORDER — GLIPIZIDE/METFORMIN HCL 2.5-250 MG
1 TABLET ORAL
Refills: 0 | DISCHARGE

## 2025-02-18 RX ORDER — MEXILETINE HYDROCHLORIDE 250 MG/1
150 CAPSULE ORAL THREE TIMES A DAY
Refills: 0 | Status: DISCONTINUED | OUTPATIENT
Start: 2025-02-18 | End: 2025-02-19

## 2025-02-18 RX ORDER — ORAL SEMAGLUTIDE 14 MG/1
0.5 TABLET ORAL
Refills: 0 | DISCHARGE

## 2025-02-18 RX ORDER — SACUBITRIL AND VALSARTAN 49; 51 MG/1; MG/1
1 TABLET, FILM COATED ORAL
Refills: 0 | Status: DISCONTINUED | OUTPATIENT
Start: 2025-02-18 | End: 2025-02-19

## 2025-02-18 RX ORDER — ASPIRIN 325 MG
81 TABLET ORAL DAILY
Refills: 0 | Status: DISCONTINUED | OUTPATIENT
Start: 2025-02-18 | End: 2025-02-19

## 2025-02-18 RX ORDER — SPIRONOLACTONE 25 MG
25 TABLET ORAL DAILY
Refills: 0 | Status: DISCONTINUED | OUTPATIENT
Start: 2025-02-18 | End: 2025-02-19

## 2025-02-18 RX ORDER — INFLUENZA A VIRUS A/IDAHO/07/2018 (H1N1) ANTIGEN (MDCK CELL DERIVED, PROPIOLACTONE INACTIVATED, INFLUENZA A VIRUS A/INDIANA/08/2018 (H3N2) ANTIGEN (MDCK CELL DERIVED, PROPIOLACTONE INACTIVATED), INFLUENZA B VIRUS B/SINGAPORE/INFTT-16-0610/2016 ANTIGEN (MDCK CELL DERIVED, PROPIOLACTONE INACTIVATED), INFLUENZA B VIRUS B/IOWA/06/2017 ANTIGEN (MDCK CELL DERIVED, PROPIOLACTONE INACTIVATED) 15; 15; 15; 15 UG/.5ML; UG/.5ML; UG/.5ML; UG/.5ML
0.5 INJECTION, SUSPENSION INTRAMUSCULAR ONCE
Refills: 0 | Status: COMPLETED | OUTPATIENT
Start: 2025-02-18 | End: 2025-02-18

## 2025-02-18 RX ORDER — DAPAGLIFLOZIN 5 MG/1
1 TABLET, FILM COATED ORAL
Refills: 0 | DISCHARGE

## 2025-02-18 RX ORDER — SACUBITRIL AND VALSARTAN 49; 51 MG/1; MG/1
1 TABLET, FILM COATED ORAL
Refills: 0 | DISCHARGE

## 2025-02-18 RX ORDER — AMIODARONE HYDROCHLORIDE 50 MG/ML
1 INJECTION, SOLUTION INTRAVENOUS
Refills: 0 | DISCHARGE

## 2025-02-18 RX ORDER — APIXABAN 2.5 MG/1
5 TABLET, FILM COATED ORAL
Refills: 0 | Status: DISCONTINUED | OUTPATIENT
Start: 2025-02-18 | End: 2025-02-19

## 2025-02-18 RX ORDER — SPIRONOLACTONE 25 MG
1 TABLET ORAL
Refills: 0 | DISCHARGE

## 2025-02-18 RX ORDER — ATORVASTATIN CALCIUM 80 MG/1
40 TABLET, FILM COATED ORAL AT BEDTIME
Refills: 0 | Status: DISCONTINUED | OUTPATIENT
Start: 2025-02-18 | End: 2025-02-19

## 2025-02-18 RX ORDER — AMIODARONE HYDROCHLORIDE 50 MG/ML
200 INJECTION, SOLUTION INTRAVENOUS DAILY
Refills: 0 | Status: DISCONTINUED | OUTPATIENT
Start: 2025-02-18 | End: 2025-02-19

## 2025-02-18 RX ORDER — CLOPIDOGREL BISULFATE 75 MG/1
75 TABLET, FILM COATED ORAL DAILY
Refills: 0 | Status: DISCONTINUED | OUTPATIENT
Start: 2025-02-18 | End: 2025-02-18

## 2025-02-18 RX ORDER — METOPROLOL SUCCINATE 50 MG/1
12.5 TABLET, EXTENDED RELEASE ORAL
Refills: 0 | Status: DISCONTINUED | OUTPATIENT
Start: 2025-02-18 | End: 2025-02-19

## 2025-02-18 RX ORDER — MEXILETINE HYDROCHLORIDE 250 MG/1
1 CAPSULE ORAL
Refills: 0 | DISCHARGE

## 2025-02-18 RX ADMIN — APIXABAN 5 MILLIGRAM(S): 2.5 TABLET, FILM COATED ORAL at 19:00

## 2025-02-18 RX ADMIN — SACUBITRIL AND VALSARTAN 1 TABLET(S): 49; 51 TABLET, FILM COATED ORAL at 21:45

## 2025-02-18 RX ADMIN — METOPROLOL SUCCINATE 12.5 MILLIGRAM(S): 50 TABLET, EXTENDED RELEASE ORAL at 19:00

## 2025-02-18 RX ADMIN — ATORVASTATIN CALCIUM 40 MILLIGRAM(S): 80 TABLET, FILM COATED ORAL at 21:45

## 2025-02-18 RX ADMIN — AMIODARONE HYDROCHLORIDE 200 MILLIGRAM(S): 50 INJECTION, SOLUTION INTRAVENOUS at 19:00

## 2025-02-18 NOTE — H&P ADULT - ASSESSMENT
84 year-old female with a past medical history significant for HLD, HTN, NIDDM-II, R breast CA s/p lumpectomy and chemo (in remission), CAD s/p PCI x 3 (last 2008 @ [facility]), symptomatic sustained VT s/p ICD placement 5/1/19 for secondary prevention of SCD with recurrent sustained VT despite AAD therapy

## 2025-02-18 NOTE — PATIENT PROFILE ADULT - ARRIVAL FROM
Spoke with patient on Friday to offer surgery dates. She chose 11/10. She was informed that she must have medical clearance within 30 days prior to her surgery. Home

## 2025-02-18 NOTE — H&P ADULT - NSICDXPASTMEDICALHX_GEN_ALL_CORE_FT
PAST MEDICAL HISTORY:  CAD (coronary artery disease) s/p MI & stent x1 in 05, x1 in 06 & x1 in 08 in [facility]    Diabetes mellitus     Essential hypertension     H/O sustained ventricular tachycardia     Hx of breast cancer     Hypercholesterolemia

## 2025-02-18 NOTE — H&P ADULT - HISTORY OF PRESENT ILLNESS
84 year-old female with a past medical history significant for HLD, HTN, NIDDM-II, R breast CA s/p lumpectomy and chemo (in remission), CAD s/p PCI x 3 (last 2008 @ [facility]) and symptomatic sustained VT s/p ICD placement 5/1/19 for secondary prevention of SCD.  ?  She had sustained VT s/p ICD shock 5/5/19. She deferred ablation and was started on Amiodarone. Recurrent events s/p ICD shock and subsequently she underwent CABG 9/2023 with Dr. Ramirez. Recurrent VT treated with ATP 11/2023.  ?  She received ICD therapy for VT 8/20/24; admitted to [facility]. Reports she was symptomatic with dizziness and general malaise that day. She reports she had a cardiac cath with no intervention. Amiodarone was increased to 400 mg daily.  She was readmitted to [facility] with VT below the detection zone, s/p cardioversion in 12/2024.    ?  Now on Amiodarone 200 mg daily (had visual hallucinations on higher dose).  Denies interim hospitalizations.

## 2025-02-19 ENCOUNTER — TRANSCRIPTION ENCOUNTER (OUTPATIENT)
Age: 85
End: 2025-02-19

## 2025-02-19 VITALS
TEMPERATURE: 98 F | OXYGEN SATURATION: 98 % | DIASTOLIC BLOOD PRESSURE: 57 MMHG | RESPIRATION RATE: 16 BRPM | SYSTOLIC BLOOD PRESSURE: 115 MMHG | HEART RATE: 55 BPM

## 2025-02-19 PROBLEM — Z86.79 PERSONAL HISTORY OF OTHER DISEASES OF THE CIRCULATORY SYSTEM: Chronic | Status: ACTIVE | Noted: 2025-02-18

## 2025-02-19 PROCEDURE — 86850 RBC ANTIBODY SCREEN: CPT

## 2025-02-19 PROCEDURE — 82330 ASSAY OF CALCIUM: CPT

## 2025-02-19 PROCEDURE — 36415 COLL VENOUS BLD VENIPUNCTURE: CPT

## 2025-02-19 PROCEDURE — 82565 ASSAY OF CREATININE: CPT

## 2025-02-19 PROCEDURE — 82947 ASSAY GLUCOSE BLOOD QUANT: CPT

## 2025-02-19 PROCEDURE — C1894: CPT

## 2025-02-19 PROCEDURE — C1732: CPT

## 2025-02-19 PROCEDURE — C1769: CPT

## 2025-02-19 PROCEDURE — 86900 BLOOD TYPING SEROLOGIC ABO: CPT

## 2025-02-19 PROCEDURE — 80048 BASIC METABOLIC PNL TOTAL CA: CPT

## 2025-02-19 PROCEDURE — 82962 GLUCOSE BLOOD TEST: CPT

## 2025-02-19 PROCEDURE — 85730 THROMBOPLASTIN TIME PARTIAL: CPT

## 2025-02-19 PROCEDURE — C1760: CPT

## 2025-02-19 PROCEDURE — 85610 PROTHROMBIN TIME: CPT

## 2025-02-19 PROCEDURE — 85347 COAGULATION TIME ACTIVATED: CPT

## 2025-02-19 PROCEDURE — 82803 BLOOD GASES ANY COMBINATION: CPT

## 2025-02-19 PROCEDURE — 85014 HEMATOCRIT: CPT

## 2025-02-19 PROCEDURE — 84295 ASSAY OF SERUM SODIUM: CPT

## 2025-02-19 PROCEDURE — C1730: CPT

## 2025-02-19 PROCEDURE — C1759: CPT

## 2025-02-19 PROCEDURE — 84132 ASSAY OF SERUM POTASSIUM: CPT

## 2025-02-19 PROCEDURE — C1766: CPT

## 2025-02-19 PROCEDURE — 85027 COMPLETE CBC AUTOMATED: CPT

## 2025-02-19 PROCEDURE — 86901 BLOOD TYPING SEROLOGIC RH(D): CPT

## 2025-02-19 PROCEDURE — C2630: CPT

## 2025-02-19 RX ORDER — APIXABAN 2.5 MG/1
1 TABLET, FILM COATED ORAL
Qty: 60 | Refills: 2
Start: 2025-02-19 | End: 2025-05-19

## 2025-02-19 RX ORDER — APIXABAN 2.5 MG/1
1 TABLET, FILM COATED ORAL
Qty: 0 | Refills: 0 | DISCHARGE
Start: 2025-02-19

## 2025-02-19 RX ADMIN — APIXABAN 5 MILLIGRAM(S): 2.5 TABLET, FILM COATED ORAL at 05:50

## 2025-02-19 RX ADMIN — METOPROLOL SUCCINATE 12.5 MILLIGRAM(S): 50 TABLET, EXTENDED RELEASE ORAL at 05:50

## 2025-02-19 RX ADMIN — Medication 25 MILLIGRAM(S): at 05:50

## 2025-02-19 RX ADMIN — SACUBITRIL AND VALSARTAN 1 TABLET(S): 49; 51 TABLET, FILM COATED ORAL at 05:50

## 2025-02-19 RX ADMIN — MEXILETINE HYDROCHLORIDE 150 MILLIGRAM(S): 250 CAPSULE ORAL at 05:51

## 2025-02-19 RX ADMIN — AMIODARONE HYDROCHLORIDE 200 MILLIGRAM(S): 50 INJECTION, SOLUTION INTRAVENOUS at 05:50

## 2025-02-19 RX ADMIN — Medication 81 MILLIGRAM(S): at 11:44

## 2025-02-19 NOTE — DISCHARGE NOTE PROVIDER - NSDCMRMEDTOKEN_GEN_ALL_CORE_FT
amiodarone 200 mg oral tablet: 1 tab(s) orally once a day  apixaban 5 mg oral tablet: 1 tab(s) orally 2 times a day  aspirin 81 mg oral delayed release tablet: 1 tab(s) orally once a day  atorvastatin 40 mg oral tablet: 1 tab(s) orally once a day (at bedtime)  Entresto 24 mg-26 mg oral tablet: 1 tab(s) orally 2 times a day  Farxiga 10 mg oral tablet: 1 tab(s) orally once a day  glipizide-metformin 2.5 mg-500 mg oral tablet: 1 tab(s) orally once a day  Metoprolol Tartrate 25 mg oral tablet: 0.5 tab(s) orally 2 times a day  mexiletine 150 mg oral capsule: 1 cap(s) orally 3 times a day  Ozempic 2 mg/1.5 mL (0.25 mg or 0.5 mg dose) subcutaneous solution: 0.5 milligram(s) subcutaneously  spironolactone 25 mg oral tablet: 1 tab(s) orally once a day

## 2025-02-19 NOTE — DISCHARGE NOTE NURSING/CASE MANAGEMENT/SOCIAL WORK - NSDCPEPT PROEDMA_GEN_ALL_CORE
WART TREATMENT      Podiatry Wart Removal After-Care Instructions:  Medication had been applied to your foot. The medication used was either Cantharone or Trichloracetic acid. You should leave the tape in place for 24 hours, then remove the tape and cleanse the area.   You should schedule a follow up appointment in 2 weeks.   You may develop a blister. This is not uncommon after this procedure, and will likely resolve within 4 days. If you are unable to tolerate the blister or it does not resolve, please schedule an appointment to have the blister drained.     Other recommendations include:    Keep feet clean and dry   Change socks and shoes often   Do not walk around barefoot   Spray bleach water in your shower stall after showering.  This will help kill the virus and prevent spreading to other areas of your foot or to other family members who use that shower.       Follow-Up: 2 weeks for re-treatment        Yes

## 2025-02-19 NOTE — DISCHARGE NOTE PROVIDER - CARE PROVIDER_API CALL
Minh Sesay  Cardiac Electrophysiology  [address], [address] [place] [address]  Phone: [phone]  Fax: [phone]  Follow Up Time: 1 month

## 2025-02-19 NOTE — DISCHARGE NOTE PROVIDER - NSDCCPCAREPLAN_GEN_ALL_CORE_FT
Cristal Peck MD at [facility] 149 N/A 2019    BRONCHOSCOPY W/EBUS FNA performed by Cristal Peck MD at [facility]  prior to     left hand    CATARACT REMOVAL  2004    both eyes    COLONOSCOPY  2011    benign polyp    CYSTOSCOPY      ENDOMETRIAL ABLATION  1960    ESOPHAGEAL DILATATION  2012    EYE SURGERY      HERNIA REPAIR  2003    papaesophageal    HYSTERECTOMY      JOINT REPLACEMENT      KNEE ARTHROSCOPY  prior to     both knees    LAMINECTOMY  10/2/13    PLACEMENT OF LEAD AND SPINAL CORD STIMULATOR    SHOULDER SURGERY  2003    rotator cuff repair, partial removal collar bone and shoulder bone    LAZARA AND BSO      TOTAL KNEE ARTHROPLASTY  2006    left    TOTAL KNEE ARTHROPLASTY  2007    right knee    UPPER GASTROINTESTINAL ENDOSCOPY      Nieves's esophagus    URETHRAL STRICTURE DILATATION         Allergies   Allergen Reactions    Cephalexin Other (See Comments)     headaches    Reclast [Zoledronic Acid]     Sulfa Antibiotics        Social History     Socioeconomic History    Marital status:       Spouse name: Not on file    Number of children: Not on file    Years of education: Not on file    Highest education level: Not on file   Occupational History    Not on file   Social Needs    Financial resource strain: Not on file    Food insecurity:     Worry: Not on file     Inability: Not on file    Transportation needs:     Medical: Not on file     Non-medical: Not on file   Tobacco Use    Smoking status: Former Smoker     Packs/day: 1.00     Years: 45.00     Pack years: 45.00     Types: Cigarettes     Last attempt to quit: 2006     Years since quittin.9    Smokeless tobacco: Never Used   Substance and Sexual Activity    Alcohol use: Yes     Comment: rare    Drug use: Never    Sexual activity: Not on file   Lifestyle    Physical activity:     Days per week: Not do not hesitate to contact us with any questions. Thank you for the consultation. Plan discussed with Dr. Rhina Smiley. Thank you for the consultation. Patient educated on plan of care and disease process. All questions answered.         Electronically signed by MICHELL Shelby CNP on 9/17/2019 at 12:52 PM PRINCIPAL DISCHARGE DIAGNOSIS  Diagnosis: VT (ventricular tachycardia)  Assessment and Plan of Treatment: Ventricular tachycardia (VT) is a fast, abnormal heart rhythm (arrhythmia). It starts in your heart’s lower chambers, called the ventricles. VT is defined as 3 or more heartbeats in a row, at a rate of more than 100 beats a minute. If VT lasts for more than a few seconds at a time, it can become life-threatening. Sustained VT is when the arrhythmia lasts for more than 30 seconds. Or when it's linked to a blood flow issue, such as abnormal blood pressure. Otherwise the VT is called nonsustained. The rapid heartbeat doesn't give your heart enough time to fill with blood before it contracts again. This can affect blood flow to the rest of your body.  You underwent an ablation that helped get rid of this arrythmia. You are to stop taking your Plavix. You were started on eliquis 5 mg twice daily. Your procedure was done through your groin.  You do not need to keep this area covered and you may shower. Please avoid any heavy lifting  (no more than 3 to 5 lbs) or strenuous activity for 7 days. If you develop any swelling, bleeding, hardening of the skin (hematoma formation), acute pain, numbness/tingling in your leg please contact your doctor immediately. Please return to the hospital/seek immediate medical attention if worsening of symptoms- including not limited to chest pain, shortness of breath.   Please follow-up w/ Dr. Sesay in 4-6 weeks. Please call our office at [phone] if you have any questions.     PRINCIPAL DISCHARGE DIAGNOSIS  Diagnosis: VT (ventricular tachycardia)  Assessment and Plan of Treatment: Ventricular tachycardia (VT) is a fast, abnormal heart rhythm (arrhythmia). It starts in your heart’s lower chambers, called the ventricles. VT is defined as 3 or more heartbeats in a row, at a rate of more than 100 beats a minute. If VT lasts for more than a few seconds at a time, it can become life-threatening. Sustained VT is when the arrhythmia lasts for more than 30 seconds. Or when it's linked to a blood flow issue, such as abnormal blood pressure. Otherwise the VT is called nonsustained. The rapid heartbeat doesn't give your heart enough time to fill with blood before it contracts again. This can affect blood flow to the rest of your body.  You underwent an ablation that helped get rid of this arrythmia. You are to stop taking your Plavix. You were started on eliquis 5 mg twice daily. Your procedure was done through your groin.  You do not need to keep this area covered and you may shower. Please avoid any heavy lifting  (no more than 3 to 5 lbs) or strenuous activity for 7 days. If you develop any swelling, bleeding, hardening of the skin (hematoma formation), acute pain, numbness/tingling in your leg please contact your doctor immediately. Please return to the hospital/seek immediate medical attention if worsening of symptoms- including not limited to chest pain, shortness of breath.   Please stop plavix and you should start eliquis (apixaban) 5 mg twice daily for the next 2 months.   Please follow-up w/ Dr. Sesay in 4-6 weeks. Please call our office at [phone] if you have any questions.

## 2025-02-19 NOTE — DISCHARGE NOTE NURSING/CASE MANAGEMENT/SOCIAL WORK - NSDCPEFALRISK_GEN_ALL_CORE
For information on Fall & Injury Prevention, visit: [email or web address] OR  [email or web address] OR  https://www.cdc.gov/steadi/patient.html

## 2025-02-19 NOTE — DISCHARGE NOTE NURSING/CASE MANAGEMENT/SOCIAL WORK - PATIENT PORTAL LINK FT
You can access the FollowMyHealth Patient Portal offered by [facility] by registering at the following website: [email or web address]. By joining [facility]’s FollowMyHealth portal, you will also be able to view your health information using other applications (apps) compatible with our system.

## 2025-02-19 NOTE — DISCHARGE NOTE PROVIDER - NSDCQMSTROKE_NEU_ALL_CORE
----- Message from Karmen Coelho sent at 9/21/2022  9:18 AM CDT -----  Contact: [phone]  Requesting an RX refill or new RX.  Is this a refill or new RX: refill  RX name and strength (copy/paste from chart):    lisinopriL (PRINIVIL,ZESTRIL) 5 MG tablet    JARDIANCE 10 mg Tab      Is this a 30 day or 90 day RX: 90  Pharmacy name and phone # (copy/paste from chart):    [facility]/pharmacy #[id number] - [place], [place] - [address]  [address] [address]  Phone: [phone] Fax: [phone]        The doctors have asked that we provide their patients with the following 2 reminders -- prescription refills can take up to 72 hours, and a friendly reminder that in the future you can use your [facility] account to request refills: aware       Please f/u once refill has been completed           No

## 2025-02-19 NOTE — DISCHARGE NOTE PROVIDER - NSDCFUSCHEDAPPT_GEN_ALL_CORE_FT
[facility]  [facility] [address]  Scheduled Appointment: 03/18/2025

## 2025-02-19 NOTE — DISCHARGE NOTE PROVIDER - HOSPITAL COURSE
84 year-old female with a past medical history significant for HLD, HTN, NIDDM-II, R breast CA s/p lumpectomy and chemo (in remission), CAD s/p PCI x 3 (last 2008 @ [facility]) and symptomatic sustained VT s/p ICD placement 5/1/19 for secondary prevention of SCD. She had sustained VT s/p ICD shock 5/5/19. She deferred ablation and was started on Amiodarone. Recurrent events s/p ICD shock and subsequently she underwent CABG 9/2023 with Dr. Ramirez. Recurrent VT treated with ATP 11/2023.   She received ICD therapy for VT 8/20/24; admitted to [facility]. Reports she was symptomatic with dizziness and general malaise that day. She reports she had a cardiac cath with no intervention. Amiodarone was increased to 400 mg daily.  She was readmitted to [facility] with VT below the detection zone, s/p cardioversion in 12/2024.    Now on Amiodarone 200 mg daily (had visual hallucinations on higher dose).  Denies interim hospitalizations.  Patient is now s/p VT ablation on 2/19/25. He is stopping his plavix and was started on eliquis 5 mg twice daily for 60 days.  She will continue toprol, mexiletine and amio.     No significant events on telemetry overnight. Repeat EKG without ischemic changes. Patient has been medically cleared for discharge as per Dr. Sesay.  Patient has been given appropriate discharge instructions including medication regimen, access site management and follow up. Medications that patient needs refills on have been e-prescribed to preferred pharmacy.

## 2025-03-10 ENCOUNTER — APPOINTMENT (OUTPATIENT)
Dept: HEART AND VASCULAR | Facility: CLINIC | Age: 85
End: 2025-03-10
Payer: MEDICARE

## 2025-03-10 ENCOUNTER — NON-APPOINTMENT (OUTPATIENT)
Age: 85
End: 2025-03-10

## 2025-03-10 VITALS
WEIGHT: 141 LBS | SYSTOLIC BLOOD PRESSURE: 113 MMHG | HEART RATE: 65 BPM | DIASTOLIC BLOOD PRESSURE: 71 MMHG | BODY MASS INDEX: 22.66 KG/M2 | TEMPERATURE: 97.2 F | HEIGHT: 66 IN

## 2025-03-10 PROCEDURE — 99214 OFFICE O/P EST MOD 30 MIN: CPT | Mod: 25

## 2025-03-10 PROCEDURE — 93284 PRGRMG EVAL IMPLANTABLE DFB: CPT

## 2025-03-21 NOTE — H&P ADULT - NSHPOUTPATIENTPROVIDERS_GEN_ALL_CORE
Dr. Sesay Patient is critically ill, requiring critical care services. Patient is critically ill, requiring critical care services.

## 2025-03-31 ENCOUNTER — RX RENEWAL (OUTPATIENT)
Age: 85
End: 2025-03-31

## 2025-04-14 ENCOUNTER — APPOINTMENT (OUTPATIENT)
Dept: HEART AND VASCULAR | Facility: CLINIC | Age: 85
End: 2025-04-14

## 2025-04-14 PROCEDURE — 93297 REM INTERROG DEV EVAL ICPMS: CPT

## 2025-05-16 ENCOUNTER — NON-APPOINTMENT (OUTPATIENT)
Age: 85
End: 2025-05-16

## 2025-05-16 ENCOUNTER — APPOINTMENT (OUTPATIENT)
Dept: HEART AND VASCULAR | Facility: CLINIC | Age: 85
End: 2025-05-16
Payer: MEDICARE

## 2025-05-16 PROCEDURE — 93297 REM INTERROG DEV EVAL ICPMS: CPT

## 2025-06-20 ENCOUNTER — APPOINTMENT (OUTPATIENT)
Dept: HEART AND VASCULAR | Facility: CLINIC | Age: 85
End: 2025-06-20
Payer: MEDICARE

## 2025-06-20 ENCOUNTER — NON-APPOINTMENT (OUTPATIENT)
Age: 85
End: 2025-06-20

## 2025-06-20 PROCEDURE — 93295 DEV INTERROG REMOTE 1/2/MLT: CPT

## 2025-06-20 PROCEDURE — 93296 REM INTERROG EVL PM/IDS: CPT

## 2025-07-25 ENCOUNTER — APPOINTMENT (OUTPATIENT)
Dept: HEART AND VASCULAR | Facility: CLINIC | Age: 85
End: 2025-07-25
Payer: MEDICARE

## 2025-07-25 ENCOUNTER — NON-APPOINTMENT (OUTPATIENT)
Age: 85
End: 2025-07-25

## 2025-07-25 PROCEDURE — 93296 REM INTERROG EVL PM/IDS: CPT

## 2025-07-25 PROCEDURE — 93295 DEV INTERROG REMOTE 1/2/MLT: CPT

## 2025-08-29 ENCOUNTER — APPOINTMENT (OUTPATIENT)
Dept: HEART AND VASCULAR | Facility: CLINIC | Age: 85
End: 2025-08-29

## (undated) DEVICE — PACK PROC CV DRAPE

## (undated) DEVICE — PACING CABLE (BROWN) A/V TEMP SCREW DOWN 12FT

## (undated) DEVICE — DRAPE IOBAN 33" X 23"

## (undated) DEVICE — POSITIONER FOAM EGG CRATE ULNAR 2PCS (PINK)

## (undated) DEVICE — AROS VESSEL CLAMP SINGLE LARGE (YELLOW) 120G

## (undated) DEVICE — LAP PAD 18 X 18"

## (undated) DEVICE — ELCTR ZOLL DEFIBRILLATOR PAD NO REPLACEMENT

## (undated) DEVICE — CHEST DRAIN PLEUR-EVAC DRY/WET ADULT-PEDS SINGLE (QUICK)

## (undated) DEVICE — SUCTION CATH ARGYLE WHISTLE TIP 14FR STRAIGHT PACKED

## (undated) DEVICE — SUT SILK 0 18" CT-1 (POP-OFF)

## (undated) DEVICE — DRSG DERMABOND 0.7ML

## (undated) DEVICE — FOLEY TRAY 16FR 5CC LF LUBRISIL ADVANCE TEMP CLOSED

## (undated) DEVICE — ACROBAT I-POSITIONER

## (undated) DEVICE — DRAPE PROBE COVER 5" X 96"

## (undated) DEVICE — CATH TRIOX OXIMETRY 8F 3 LUMENS

## (undated) DEVICE — ELCTR BOVIE TIP BLADE INSULATED 4" EDGE

## (undated) DEVICE — ELCTR REM POLYHESIVE ADULT PT RETURN 15FT

## (undated) DEVICE — Device

## (undated) DEVICE — SUT NUROLON 1 18" OS-8 (POP-OFF)

## (undated) DEVICE — TUBING STRYKER PNEUMOCLEAR HIGH FLOW

## (undated) DEVICE — DRAPE TOWEL BLUE 17" X 24"

## (undated) DEVICE — VASOVIEW HEMOPRO ENDO SYSTEM

## (undated) DEVICE — DRAPE PROBE COVER LATEX FREE 3X96"

## (undated) DEVICE — TUBING SMOKE EVAC 3/8" X 10FT FOR NEPTUNE

## (undated) DEVICE — SUT PROLENE 8-0 24" BV175-6

## (undated) DEVICE — ACROBAT I-STABILIZER

## (undated) DEVICE — MEDTRONIC URCHIN EVO HEART POSITIONER & CANISTER TUBING SET

## (undated) DEVICE — SUMP INTRACARDIAC/PERICARDIAL 20FR 1/4" ADULT

## (undated) DEVICE — DRSG ACE BANDAGE 6" LF STERILE

## (undated) DEVICE — SUT VICRYL 1 36" CTX UNDYED

## (undated) DEVICE — GLV 6 PROTEXIS (WHITE)

## (undated) DEVICE — GLV 7 PROTEXIS (WHITE)

## (undated) DEVICE — SUT PROLENE 7-0 24" BV175-6

## (undated) DEVICE — SUT PROLENE 3-0 36" SH-1

## (undated) DEVICE — SUT MONOCRYL 4-0 27" PS-2 UNDYED

## (undated) DEVICE — CATH IV SAFE BC 24G X 0.75" (YELLOW)

## (undated) DEVICE — SUT STAINLESS STEEL 6 4-18" CCS

## (undated) DEVICE — GAUZE SPONGE 12PLY DMT MT 8X4

## (undated) DEVICE — PACK OPEN HEART LNX

## (undated) DEVICE — WARMING BLANKET FULL ADULT

## (undated) DEVICE — DRSG BIOPATCH DISK W CHG 1" W 4.0MM HOLE

## (undated) DEVICE — ELCTR BOVIE TIP BLADE INSULATED 3" EDGE

## (undated) DEVICE — ELCTR STRYKER NEPTUNE SMOKE EVACUATION PENCIL (GREEN)

## (undated) DEVICE — GLV 7.5 PROTEXIS (WHITE)

## (undated) DEVICE — GOWN TRIMAX XXL

## (undated) DEVICE — SUT DOUBLE 6 WIRE STERNAL

## (undated) DEVICE — BLADE SCALPEL SAFETY #11 WITH PLASTIC GREEN HANDLE

## (undated) DEVICE — TUBING BRAT 2 SUCTION ASSEMBLY TWIST LOCK

## (undated) DEVICE — BLADE ETHICON HARMONIC SYNERGY HOOK TIP 3MM 4CM-9CM

## (undated) DEVICE — SUT ETHIBOND 0 18" TIES

## (undated) DEVICE — DRSG TRACH DRAINAGE 4X4

## (undated) DEVICE — SUT SILK 2-0 30" SH

## (undated) DEVICE — SUT VICRYL 0 27" CT

## (undated) DEVICE — PACK PROCEDURE HARVEST SMARTPREP APC-60

## (undated) DEVICE — BLOWER MISTER AXIUS WITH IV SET

## (undated) DEVICE — PREP SCRUB BRUSH W CHG 4%

## (undated) DEVICE — DRSG ALLEVYN LIFE 6 X 6 (PINK)

## (undated) DEVICE — DRAIN RESERVOIR FOR JACKSON PRATT 100CC CARDINAL

## (undated) DEVICE — GLV 6.5 PROTEXIS (WHITE)

## (undated) DEVICE — DRAPE TOWEL WHITE 17" X 24"

## (undated) DEVICE — SOL ANTI FOG

## (undated) DEVICE — BLADE SCALPEL SAFETY #10 WITH PLASTIC GREEN HANDLE

## (undated) DEVICE — CATH CV TRAY INSR ST UNIV

## (undated) DEVICE — SUT PROLENE 7-0 24" BV-1

## (undated) DEVICE — KNIFE ALCON STANDARD FULL HANDLE 15 DEG (PINK)

## (undated) DEVICE — CATH NG SALEM SUMP 16FR

## (undated) DEVICE — SUT PROLENE 6-0 30" RB-2

## (undated) DEVICE — SUT ETHIBOND 4-0 12-18"

## (undated) DEVICE — SUT VICRYL 2-0 27" CT-1

## (undated) DEVICE — DRAPE FLUID WARMER 44 X 66"